# Patient Record
Sex: MALE | Race: WHITE | NOT HISPANIC OR LATINO
[De-identification: names, ages, dates, MRNs, and addresses within clinical notes are randomized per-mention and may not be internally consistent; named-entity substitution may affect disease eponyms.]

---

## 2018-12-18 ENCOUNTER — APPOINTMENT (OUTPATIENT)
Dept: CARDIOLOGY | Facility: CLINIC | Age: 58
End: 2018-12-18

## 2018-12-19 ENCOUNTER — APPOINTMENT (OUTPATIENT)
Dept: NEUROLOGY | Facility: CLINIC | Age: 58
End: 2018-12-19

## 2018-12-21 ENCOUNTER — OUTPATIENT (OUTPATIENT)
Dept: OUTPATIENT SERVICES | Facility: HOSPITAL | Age: 58
LOS: 1 days | End: 2018-12-21
Payer: COMMERCIAL

## 2018-12-21 VITALS
DIASTOLIC BLOOD PRESSURE: 68 MMHG | HEART RATE: 56 BPM | OXYGEN SATURATION: 98 % | WEIGHT: 255.96 LBS | HEIGHT: 75 IN | RESPIRATION RATE: 18 BRPM | TEMPERATURE: 97 F | SYSTOLIC BLOOD PRESSURE: 120 MMHG

## 2018-12-21 DIAGNOSIS — G47.33 OBSTRUCTIVE SLEEP APNEA (ADULT) (PEDIATRIC): ICD-10-CM

## 2018-12-21 DIAGNOSIS — Z96.60 PRESENCE OF UNSPECIFIED ORTHOPEDIC JOINT IMPLANT: Chronic | ICD-10-CM

## 2018-12-21 DIAGNOSIS — I65.22 OCCLUSION AND STENOSIS OF LEFT CAROTID ARTERY: ICD-10-CM

## 2018-12-21 DIAGNOSIS — Z29.9 ENCOUNTER FOR PROPHYLACTIC MEASURES, UNSPECIFIED: ICD-10-CM

## 2018-12-21 DIAGNOSIS — I35.2 NONRHEUMATIC AORTIC (VALVE) STENOSIS WITH INSUFFICIENCY: ICD-10-CM

## 2018-12-21 DIAGNOSIS — Z01.818 ENCOUNTER FOR OTHER PREPROCEDURAL EXAMINATION: ICD-10-CM

## 2018-12-21 DIAGNOSIS — Z98.89 OTHER SPECIFIED POSTPROCEDURAL STATES: Chronic | ICD-10-CM

## 2018-12-21 DIAGNOSIS — I10 ESSENTIAL (PRIMARY) HYPERTENSION: ICD-10-CM

## 2018-12-21 LAB
ANION GAP SERPL CALC-SCNC: 15 MMOL/L — SIGNIFICANT CHANGE UP (ref 5–17)
BLD GP AB SCN SERPL QL: NEGATIVE — SIGNIFICANT CHANGE UP
BUN SERPL-MCNC: 17 MG/DL — SIGNIFICANT CHANGE UP (ref 7–23)
CALCIUM SERPL-MCNC: 10.2 MG/DL — SIGNIFICANT CHANGE UP (ref 8.4–10.5)
CHLORIDE SERPL-SCNC: 98 MMOL/L — SIGNIFICANT CHANGE UP (ref 96–108)
CO2 SERPL-SCNC: 22 MMOL/L — SIGNIFICANT CHANGE UP (ref 22–31)
CREAT SERPL-MCNC: 1.22 MG/DL — SIGNIFICANT CHANGE UP (ref 0.5–1.3)
GLUCOSE SERPL-MCNC: 77 MG/DL — SIGNIFICANT CHANGE UP (ref 70–99)
HCT VFR BLD CALC: 44.7 % — SIGNIFICANT CHANGE UP (ref 39–50)
HGB BLD-MCNC: 14.8 G/DL — SIGNIFICANT CHANGE UP (ref 13–17)
MCHC RBC-ENTMCNC: 33 PG — SIGNIFICANT CHANGE UP (ref 27–34)
MCHC RBC-ENTMCNC: 33.1 GM/DL — SIGNIFICANT CHANGE UP (ref 32–36)
MCV RBC AUTO: 99.6 FL — SIGNIFICANT CHANGE UP (ref 80–100)
PLATELET # BLD AUTO: 273 K/UL — SIGNIFICANT CHANGE UP (ref 150–400)
POTASSIUM SERPL-MCNC: 4.3 MMOL/L — SIGNIFICANT CHANGE UP (ref 3.5–5.3)
POTASSIUM SERPL-SCNC: 4.3 MMOL/L — SIGNIFICANT CHANGE UP (ref 3.5–5.3)
RBC # BLD: 4.49 M/UL — SIGNIFICANT CHANGE UP (ref 4.2–5.8)
RBC # FLD: 13.7 % — SIGNIFICANT CHANGE UP (ref 10.3–14.5)
RH IG SCN BLD-IMP: POSITIVE — SIGNIFICANT CHANGE UP
SODIUM SERPL-SCNC: 135 MMOL/L — SIGNIFICANT CHANGE UP (ref 135–145)
WBC # BLD: 10.21 K/UL — SIGNIFICANT CHANGE UP (ref 3.8–10.5)
WBC # FLD AUTO: 10.21 K/UL — SIGNIFICANT CHANGE UP (ref 3.8–10.5)

## 2018-12-21 PROCEDURE — 86901 BLOOD TYPING SEROLOGIC RH(D): CPT

## 2018-12-21 PROCEDURE — G0463: CPT

## 2018-12-21 PROCEDURE — 93010 ELECTROCARDIOGRAM REPORT: CPT

## 2018-12-21 PROCEDURE — 85027 COMPLETE CBC AUTOMATED: CPT

## 2018-12-21 PROCEDURE — 86900 BLOOD TYPING SEROLOGIC ABO: CPT

## 2018-12-21 PROCEDURE — 86850 RBC ANTIBODY SCREEN: CPT

## 2018-12-21 PROCEDURE — 93005 ELECTROCARDIOGRAM TRACING: CPT

## 2018-12-21 PROCEDURE — 80048 BASIC METABOLIC PNL TOTAL CA: CPT

## 2018-12-21 NOTE — H&P PST ADULT - RS GEN PE MLT RESP DETAILS PC
clear to auscultation bilaterally/breath sounds equal/normal/respirations non-labored/good air movement

## 2018-12-21 NOTE — H&P PST ADULT - ASSESSMENT
CAPRINI SCORE [CLOT]    AGE RELATED RISK FACTORS                                                       MOBILITY RELATED FACTORS  [x ] Age 41-60 years                                            (1 Point)                  [ ] Bed rest                                                        (1 Point)  [ ] Age: 61-74 years                                           (2 Points)                 [ ] Plaster cast                                                   (2 Points)  [ ] Age= 75 years                                              (3 Points)                 [ ] Bed bound for more than 72 hours                 (2 Points)    DISEASE RELATED RISK FACTORS                                               GENDER SPECIFIC FACTORS  [ ] Edema in the lower extremities                       (1 Point)                  [ ] Pregnancy                                                     (1 Point)  [ ] Varicose veins                                               (1 Point)                  [ ] Post-partum < 6 weeks                                   (1 Point)             [x ] BMI > 25 Kg/m2                                            (1 Point)                  [ ] Hormonal therapy  or oral contraception          (1 Point)                 [ ] Sepsis (in the previous month)                        (1 Point)                  [ ] History of pregnancy complications                 (1 point)  [ ] Pneumonia or serious lung disease                                               [ ] Unexplained or recurrent                     (1 Point)           (in the previous month)                               (1 Point)  [ ] Abnormal pulmonary function test                     (1 Point)                 SURGERY RELATED RISK FACTORS  [ ] Acute myocardial infarction                              (1 Point)                 [ ]  Section                                             (1 Point)  [ ] Congestive heart failure (in the previous month)  (1 Point)               [ ] Minor surgery                                                  (1 Point)   [ ] Inflammatory bowel disease                             (1 Point)                 [ ] Arthroscopic surgery                                        (2 Points)  [ ] Central venous access                                      (2 Points)                [x ] General surgery lasting more than 45 minutes   (2 Points)       [ ] Stroke (in the previous month)                          (5 Points)               [ ] Elective arthroplasty                                         (5 Points)                                                                                                                                               HEMATOLOGY RELATED FACTORS                                                 TRAUMA RELATED RISK FACTORS  [ ] Prior episodes of VTE                                     (3 Points)                [ ] Fracture of the hip, pelvis, or leg                       (5 Points)  [ ] Positive family history for VTE                         (3 Points)                 [ ] Acute spinal cord injury (in the previous month)  (5 Points)  [ ] Prothrombin 65358 A                                     (3 Points)                 [ ] Paralysis  (less than 1 month)                             (5 Points)  [ ] Factor V Leiden                                             (3 Points)                  [ ] Multiple Trauma within 1 month                        (5 Points)  [ ] Lupus anticoagulants                                     (3 Points)                                                           [ ] Anticardiolipin antibodies                               (3 Points)                                                       [ ] High homocysteine in the blood                      (3 Points)                                             [ ] Other congenital or acquired thrombophilia      (3 Points)                                                [ ] Heparin induced thrombocytopenia                  (3 Points)                                          Total Score [        4  ] CAPRINI SCORE [CLOT]    AGE RELATED RISK FACTORS                                                       MOBILITY RELATED FACTORS  [x ] Age 41-60 years                                            (1 Point)                  [ ] Bed rest                                                        (1 Point)  [ ] Age: 61-74 years                                           (2 Points)                 [ ] Plaster cast                                                   (2 Points)  [ ] Age= 75 years                                              (3 Points)                 [ ] Bed bound for more than 72 hours                 (2 Points)    DISEASE RELATED RISK FACTORS                                               GENDER SPECIFIC FACTORS  [ ] Edema in the lower extremities                       (1 Point)                  [ ] Pregnancy                                                     (1 Point)  [ ] Varicose veins                                               (1 Point)                  [ ] Post-partum < 6 weeks                                   (1 Point)             [x ] BMI > 25 Kg/m2                                            (1 Point)                  [ ] Hormonal therapy  or oral contraception          (1 Point)                 [ ] Sepsis (in the previous month)                        (1 Point)                  [ ] History of pregnancy complications                 (1 point)  [ ] Pneumonia or serious lung disease                                               [ ] Unexplained or recurrent                     (1 Point)           (in the previous month)                               (1 Point)  [ ] Abnormal pulmonary function test                     (1 Point)                 SURGERY RELATED RISK FACTORS  [ ] Acute myocardial infarction                              (1 Point)                 [ ]  Section                                             (1 Point)  [ ] Congestive heart failure (in the previous month)  (1 Point)               [ ] Minor surgery                                                  (1 Point)   [ ] Inflammatory bowel disease                             (1 Point)                 [ ] Arthroscopic surgery                                        (2 Points)  [ ] Central venous access                                      (2 Points)                [x ] General surgery lasting more than 45 minutes   (2 Points)       [x ] Stroke (in the previous month)                          (5 Points)               [ ] Elective arthroplasty                                         (5 Points)                                                                                                                                               HEMATOLOGY RELATED FACTORS                                                 TRAUMA RELATED RISK FACTORS  [ ] Prior episodes of VTE                                     (3 Points)                [ ] Fracture of the hip, pelvis, or leg                       (5 Points)  [ ] Positive family history for VTE                         (3 Points)                 [ ] Acute spinal cord injury (in the previous month)  (5 Points)  [ ] Prothrombin 82106 A                                     (3 Points)                 [ ] Paralysis  (less than 1 month)                             (5 Points)  [ ] Factor V Leiden                                             (3 Points)                  [ ] Multiple Trauma within 1 month                        (5 Points)  [ ] Lupus anticoagulants                                     (3 Points)                                                           [ ] Anticardiolipin antibodies                               (3 Points)                                                       [ ] High homocysteine in the blood                      (3 Points)                                             [ ] Other congenital or acquired thrombophilia      (3 Points)                                                [ ] Heparin induced thrombocytopenia                  (3 Points)                                          Total Score [        9  ]

## 2018-12-21 NOTE — H&P PST ADULT - PROBLEM SELECTOR PLAN 1
Scheduled for Left Cartoid endarterectomy on 1/2/2018  Plavix last dose today 12/21 as per surgeon Dr. Madrigal   Continue asa 81mg morning of surgery  Instructions reviewed with patient   CBC, BMP, Type and cross   EKG Scheduled for Left Cartoid endarterectomy on 1/2/2018  Plavix last dose today 12/21 as per surgeon Dr. Madrigal   Continue asa 81mg morning of surgery  Instructions reviewed with patient   CBC, BMP, Type and cross   EKG  Cardiac evaluation scheduled for December 28th with Dr. Prieto

## 2018-12-21 NOTE — H&P PST ADULT - ATTENDING COMMENTS
We plan left cea for high grade stenosis.  All risks and alternatives were reiterated to the Losses who agree with this plan.

## 2018-12-21 NOTE — H&P PST ADULT - PMH
CAD (coronary artery disease)    Carotid stenosis    Diverticulitis    HLD (hyperlipidemia)    HTN (hypertension)    Obesity    TIA (transient ischemic attack)  November 2018

## 2018-12-21 NOTE — H&P PST ADULT - PROBLEM SELECTOR PLAN 3
The Caprini score indicates that this patient is at high risk for a VTE event (score > = 6).    Ssurgical patients in this group will benefit from both pharmacologic prophylaxis and intermittent compression devices.    The surgical team will determine the balance between VTE risk and bleeding risk, and other clinical considerations.

## 2018-12-21 NOTE — H&P PST ADULT - HISTORY OF PRESENT ILLNESS
Pt. is a 58 year old male with PMH htn, hld, cardiac stents last placed 2013. Pt. was admitted to medical ICU in [facility] on November 30th for presumed stroke due to symptoms. CTA at that time was negative for hemorrhage TPA was given. Pt. signed out AMA the morning after. While admitted pt. was seen by vascular and had a cartoid doppler that was significant for stenosis of the left more than right cartoid arteries. Pt. presents today for presurgical testing for Left cartoid endarterectomy on 1/2/18. Pt. is a 58 year old male with PMH htn, hld, cardiac stents last placed 2013. Pt. was admitted to medical ICU in [facility] on November 30th for presumed TIA/stroke due to symptoms. CTA at that time was negative for hemorrhage TPA was given. Pt. signed out AMA the morning after. While admitted pt. was seen by vascular and had a cartoid doppler that was significant for stenosis of the left more than right cartoid arteries. Pt. presents today for presurgical testing for Left cartoid endarterectomy on 1/2/18. Pt. states feels well today denies any chest pain, SOB, fever, chills, N/V/D. Pt. is a 58 year old male who is a poor historian with PMH htn, hld, cardiac stents last placed 2013. Pt. was admitted to medical ICU in [facility] on November 30th for presumed TIA/stroke due to symptoms. CTA at that time was negative for hemorrhage TPA was given. Pt. signed out AMA the morning after. While admitted pt. was seen by vascular and had a cartoid doppler that was significant for stenosis of the left more than right cartoid arteries. Pt. presents today for presurgical testing for Left cartoid endarterectomy on 1/2/18. Pt. states feels well today denies any chest pain, SOB, fever, chills, N/V/D. Pt. is a 58 year old male who is a poor historian with PMH htn, hld, cardiac stents last placed 2013. Pt. was admitted to medical ICU in [facility] on November 30th for presumed TIA/stroke due to symptoms. CTA at that time was negative for hemorrhage TPA was given. Pt. seen by neurology and started on Keppra for possible seizure activity. Pt. signed out AMA the morning after. While admitted pt. was seen by vascular and had a cartoid doppler that was significant for stenosis of the left Bilateral untied mittens applied to prevent pulling of lines, alternative measures such as increased safety checks done prior to applyning of untied mittens. Education provided to patients/family on reason for untied mittens. Safety and comfort checks completed q30min. Assessment for release, skin and neurovascular assessment completed q2hr and remain within defined limits. Bilateral untied mittens remain in place, assessment fro patient's safety maintained.ore than right cartoid arteries. Pt. presents today for presurgical testing for Left cartoid endarterectomy on 1/2/18. Pt. states feels well today denies any chest pain, SOB, fever, chills, N/V/D. Pt. is a 58 year old male who is a poor historian with PMH htn, hld, cardiac stents last placed 2013. Pt. was admitted to medical ICU in [facility] on November 30th for presumed TIA/stroke due to symptoms. CTA at that time was negative for hemorrhage TPA was given. Pt. seen by neurology and started on Keppra for possible seizure activity. Pt. signed out AMA the morning after. While admitted pt. was seen by vascular and had a cartoid doppler that was significant for stenosis of the left Bilateral carotid arteries left greater than right. Pt. presents today for presurgical testing for Left cartoid endarterectomy on 1/2/18. Pt. states feels well today denies any chest pain, SOB, fever, chills, N/V/D.

## 2018-12-24 ENCOUNTER — APPOINTMENT (OUTPATIENT)
Dept: CARDIOLOGY | Facility: CLINIC | Age: 58
End: 2018-12-24
Payer: COMMERCIAL

## 2018-12-24 VITALS
OXYGEN SATURATION: 100 % | DIASTOLIC BLOOD PRESSURE: 63 MMHG | SYSTOLIC BLOOD PRESSURE: 95 MMHG | HEART RATE: 49 BPM | TEMPERATURE: 97.4 F

## 2018-12-24 DIAGNOSIS — E78.00 PURE HYPERCHOLESTEROLEMIA, UNSPECIFIED: ICD-10-CM

## 2018-12-24 PROCEDURE — 93000 ELECTROCARDIOGRAM COMPLETE: CPT

## 2018-12-24 PROCEDURE — 99204 OFFICE O/P NEW MOD 45 MIN: CPT

## 2018-12-24 NOTE — PHYSICAL EXAM
[General Appearance - Well Developed] : well developed [Normal Appearance] : normal appearance [Well Groomed] : well groomed [General Appearance - Well Nourished] : well nourished [No Deformities] : no deformities [General Appearance - In No Acute Distress] : no acute distress [Normal Conjunctiva] : the conjunctiva exhibited no abnormalities [Eyelids - No Xanthelasma] : the eyelids demonstrated no xanthelasmas [Normal Oral Mucosa] : normal oral mucosa [No Oral Pallor] : no oral pallor [No Oral Cyanosis] : no oral cyanosis [Normal Jugular Venous A Waves Present] : normal jugular venous A waves present [Normal Jugular Venous V Waves Present] : normal jugular venous V waves present [No Jugular Venous Richey A Waves] : no jugular venous richey A waves [Heart Rate And Rhythm] : heart rate and rhythm were normal [Heart Sounds] : normal S1 and S2 [Murmurs] : no murmurs present [Respiration, Rhythm And Depth] : normal respiratory rhythm and effort [Exaggerated Use Of Accessory Muscles For Inspiration] : no accessory muscle use [Auscultation Breath Sounds / Voice Sounds] : lungs were clear to auscultation bilaterally [Abdomen Soft] : soft [Abdomen Tenderness] : non-tender [Abdomen Mass (___ Cm)] : no abdominal mass palpated [Abnormal Walk] : normal gait [Gait - Sufficient For Exercise Testing] : the gait was sufficient for exercise testing [Nail Clubbing] : no clubbing of the fingernails [Cyanosis, Localized] : no localized cyanosis [Petechial Hemorrhages (___cm)] : no petechial hemorrhages [Skin Color & Pigmentation] : normal skin color and pigmentation [] : no rash [No Venous Stasis] : no venous stasis [Skin Lesions] : no skin lesions [No Skin Ulcers] : no skin ulcer [No Xanthoma] : no  xanthoma was observed [Oriented To Time, Place, And Person] : oriented to person, place, and time [Affect] : the affect was normal [Mood] : the mood was normal [No Anxiety] : not feeling anxious

## 2018-12-24 NOTE — HISTORY OF PRESENT ILLNESS
[FreeTextEntry1] : 58 year old man with hypertension, hyperlipidemia and smoking. Long history of CAD dating back to 1995 when he received his first stent.  He has had subsequent coronary stents and a fem-pop for peripheral vascular disease.  Recently he presented with transient weakness and aphasia with resolution after tPA treatment.  Vascular workup has identified severe left carotid disease..

## 2018-12-24 NOTE — DISCUSSION/SUMMARY
[FreeTextEntry1] : 58 year old man with severe vascular disease including peripheral and coronary beds.  He will be needing CEA because of recent stroke.  Because of long history of CAD and inability to evaluate symptom because of recent stroke, I have recommended cardiac catheterization prior to CEA.

## 2018-12-24 NOTE — REASON FOR VISIT
[Consultation] : a consultation regarding [Coronary Artery Disease] : coronary artery disease [Peripheral Vascular Disease] : peripheral vascular disease [FreeTextEntry1] : stroke

## 2018-12-27 PROBLEM — I65.29 OCCLUSION AND STENOSIS OF UNSPECIFIED CAROTID ARTERY: Chronic | Status: ACTIVE | Noted: 2018-12-21

## 2018-12-27 PROBLEM — G45.9 TRANSIENT CEREBRAL ISCHEMIC ATTACK, UNSPECIFIED: Chronic | Status: ACTIVE | Noted: 2018-12-21

## 2019-01-02 ENCOUNTER — OUTPATIENT (OUTPATIENT)
Dept: OUTPATIENT SERVICES | Facility: HOSPITAL | Age: 59
LOS: 1 days | End: 2019-01-02
Payer: COMMERCIAL

## 2019-01-02 VITALS
HEART RATE: 54 BPM | DIASTOLIC BLOOD PRESSURE: 74 MMHG | RESPIRATION RATE: 18 BRPM | HEIGHT: 75 IN | SYSTOLIC BLOOD PRESSURE: 127 MMHG | TEMPERATURE: 98 F | WEIGHT: 179.9 LBS | OXYGEN SATURATION: 97 %

## 2019-01-02 DIAGNOSIS — Z96.60 PRESENCE OF UNSPECIFIED ORTHOPEDIC JOINT IMPLANT: Chronic | ICD-10-CM

## 2019-01-02 DIAGNOSIS — Z90.49 ACQUIRED ABSENCE OF OTHER SPECIFIED PARTS OF DIGESTIVE TRACT: Chronic | ICD-10-CM

## 2019-01-02 DIAGNOSIS — I25.10 ATHEROSCLEROTIC HEART DISEASE OF NATIVE CORONARY ARTERY WITHOUT ANGINA PECTORIS: ICD-10-CM

## 2019-01-02 DIAGNOSIS — Z98.89 OTHER SPECIFIED POSTPROCEDURAL STATES: Chronic | ICD-10-CM

## 2019-01-02 LAB
ALBUMIN SERPL ELPH-MCNC: 4.4 G/DL — SIGNIFICANT CHANGE UP (ref 3.3–5)
ALP SERPL-CCNC: 81 U/L — SIGNIFICANT CHANGE UP (ref 40–120)
ALT FLD-CCNC: 17 U/L — SIGNIFICANT CHANGE UP (ref 10–45)
ANION GAP SERPL CALC-SCNC: 16 MMOL/L — SIGNIFICANT CHANGE UP (ref 5–17)
AST SERPL-CCNC: 32 U/L — SIGNIFICANT CHANGE UP (ref 10–40)
BILIRUB SERPL-MCNC: 0.6 MG/DL — SIGNIFICANT CHANGE UP (ref 0.2–1.2)
BUN SERPL-MCNC: 17 MG/DL — SIGNIFICANT CHANGE UP (ref 7–23)
CALCIUM SERPL-MCNC: 9.4 MG/DL — SIGNIFICANT CHANGE UP (ref 8.4–10.5)
CHLORIDE SERPL-SCNC: 99 MMOL/L — SIGNIFICANT CHANGE UP (ref 96–108)
CO2 SERPL-SCNC: 22 MMOL/L — SIGNIFICANT CHANGE UP (ref 22–31)
CREAT SERPL-MCNC: 1.24 MG/DL — SIGNIFICANT CHANGE UP (ref 0.5–1.3)
GLUCOSE SERPL-MCNC: 92 MG/DL — SIGNIFICANT CHANGE UP (ref 70–99)
HCT VFR BLD CALC: 43.8 % — SIGNIFICANT CHANGE UP (ref 39–50)
HGB BLD-MCNC: 15 G/DL — SIGNIFICANT CHANGE UP (ref 13–17)
MCHC RBC-ENTMCNC: 33 PG — SIGNIFICANT CHANGE UP (ref 27–34)
MCHC RBC-ENTMCNC: 34.3 GM/DL — SIGNIFICANT CHANGE UP (ref 32–36)
MCV RBC AUTO: 96.3 FL — SIGNIFICANT CHANGE UP (ref 80–100)
PLATELET # BLD AUTO: 283 K/UL — SIGNIFICANT CHANGE UP (ref 150–400)
POTASSIUM SERPL-MCNC: 4.3 MMOL/L — SIGNIFICANT CHANGE UP (ref 3.5–5.3)
POTASSIUM SERPL-SCNC: 4.3 MMOL/L — SIGNIFICANT CHANGE UP (ref 3.5–5.3)
PROT SERPL-MCNC: 7.9 G/DL — SIGNIFICANT CHANGE UP (ref 6–8.3)
RBC # BLD: 4.55 M/UL — SIGNIFICANT CHANGE UP (ref 4.2–5.8)
RBC # FLD: 12.5 % — SIGNIFICANT CHANGE UP (ref 10.3–14.5)
SODIUM SERPL-SCNC: 137 MMOL/L — SIGNIFICANT CHANGE UP (ref 135–145)
WBC # BLD: 11.4 K/UL — HIGH (ref 3.8–10.5)
WBC # FLD AUTO: 11.4 K/UL — HIGH (ref 3.8–10.5)

## 2019-01-02 PROCEDURE — 93458 L HRT ARTERY/VENTRICLE ANGIO: CPT | Mod: 26

## 2019-01-02 PROCEDURE — 93458 L HRT ARTERY/VENTRICLE ANGIO: CPT

## 2019-01-02 PROCEDURE — 99152 MOD SED SAME PHYS/QHP 5/>YRS: CPT

## 2019-01-02 PROCEDURE — 80053 COMPREHEN METABOLIC PANEL: CPT

## 2019-01-02 PROCEDURE — C1887: CPT

## 2019-01-02 PROCEDURE — 76937 US GUIDE VASCULAR ACCESS: CPT

## 2019-01-02 PROCEDURE — 93010 ELECTROCARDIOGRAM REPORT: CPT

## 2019-01-02 PROCEDURE — C1769: CPT

## 2019-01-02 PROCEDURE — 99213 OFFICE O/P EST LOW 20 MIN: CPT

## 2019-01-02 PROCEDURE — 93005 ELECTROCARDIOGRAM TRACING: CPT

## 2019-01-02 PROCEDURE — 76937 US GUIDE VASCULAR ACCESS: CPT | Mod: 26

## 2019-01-02 PROCEDURE — C1894: CPT

## 2019-01-02 PROCEDURE — 99153 MOD SED SAME PHYS/QHP EA: CPT

## 2019-01-02 PROCEDURE — 85027 COMPLETE CBC AUTOMATED: CPT

## 2019-01-02 RX ORDER — LEVETIRACETAM 250 MG/1
1 TABLET, FILM COATED ORAL
Qty: 0 | Refills: 0 | COMMUNITY

## 2019-01-02 RX ORDER — METOPROLOL TARTRATE 50 MG
25 TABLET ORAL
Qty: 0 | Refills: 0 | COMMUNITY

## 2019-01-02 NOTE — H&P CARDIOLOGY - PMH
CAD (coronary artery disease)    Carotid stenosis    Diverticulitis    HLD (hyperlipidemia)    HTN (hypertension)    Obesity    TIA (transient ischemic attack)  November 2018 CAD (coronary artery disease)    Carotid stenosis    Diverticulitis    Diverticulitis    HLD (hyperlipidemia)    HTN (hypertension)    Obesity    CANDELARIA (obstructive sleep apnea)    TIA (transient ischemic attack)  November 2018

## 2019-01-02 NOTE — H&P CARDIOLOGY - PSH
S/P bypass graft of extremity    S/P hip replacement History of colon resection    S/P bypass graft of extremity    S/P hip replacement

## 2019-01-02 NOTE — H&P CARDIOLOGY - HISTORY OF PRESENT ILLNESS
58 year old  male who is a poor historian with PMH HTN, HLD, CAD/PCI with Prior stents last placed 2013, Right fem-pop bypass in 2004. Pt. was admitted to medical ICU in [facility] on November 30th for presumed TIA/stroke due to symptoms. CTA at that time was negative for hemorrhage, TPA was given. Pt. was seen by neurology and started on Keppra for possible seizure activity, currently off Keppra as R/O Seizure. Pt. signed out AMA the morning after. While admitted pt. was seen by vascular and had a cartoid doppler that was significant for stenosis of the left Bilateral carotid arteries left greater than right. Pt. was scheduled for Left cartoid endarterectomy on 1/2/19 but got postponed as pt needed cardiac clearance prior to procedure. Pt denies any chest pain, SOB, fever, chills, N/V/D. 58 year old  male who is a poor historian with PMH HTN, HLD, CAD/PCI with Prior stents last placed 2013, Right fem-pop bypass in 2004, CANDELARIA- does not use CPAP. Pt. was admitted to medical ICU in [facility] on November 30th for presumed TIA/stroke due to symptoms. CTA at that time was negative for hemorrhage, TPA was given. Pt. was seen by neurology and started on Keppra for possible seizure activity, currently off Keppra as R/O Seizure. Pt. signed out AMA the morning after. While admitted pt. was seen by vascular and had a cartoid doppler that was significant for stenosis of the left Bilateral carotid arteries left greater than right. Pt. was scheduled for Left cartoid endarterectomy on 1/2/19 but got postponed as pt needed cardiac clearance prior to procedure. Pt denies any chest pain, SOB, fever, chills, N/V/D.

## 2019-01-07 PROBLEM — K57.92 DIVERTICULITIS OF INTESTINE, PART UNSPECIFIED, WITHOUT PERFORATION OR ABSCESS WITHOUT BLEEDING: Chronic | Status: ACTIVE | Noted: 2019-01-02

## 2019-01-14 ENCOUNTER — APPOINTMENT (OUTPATIENT)
Dept: CARDIOLOGY | Facility: CLINIC | Age: 59
End: 2019-01-14

## 2019-01-29 ENCOUNTER — NON-APPOINTMENT (OUTPATIENT)
Age: 59
End: 2019-01-29

## 2019-01-29 ENCOUNTER — APPOINTMENT (OUTPATIENT)
Dept: CARDIOLOGY | Facility: CLINIC | Age: 59
End: 2019-01-29
Payer: COMMERCIAL

## 2019-01-29 VITALS
OXYGEN SATURATION: 98 % | BODY MASS INDEX: 32.33 KG/M2 | DIASTOLIC BLOOD PRESSURE: 72 MMHG | WEIGHT: 260 LBS | HEIGHT: 75 IN | RESPIRATION RATE: 18 BRPM | SYSTOLIC BLOOD PRESSURE: 113 MMHG | HEART RATE: 70 BPM

## 2019-01-29 DIAGNOSIS — Z78.9 OTHER SPECIFIED HEALTH STATUS: ICD-10-CM

## 2019-01-29 PROCEDURE — 93000 ELECTROCARDIOGRAM COMPLETE: CPT

## 2019-01-29 PROCEDURE — 99215 OFFICE O/P EST HI 40 MIN: CPT

## 2019-01-29 RX ORDER — CLOPIDOGREL 75 MG/1
75 TABLET, FILM COATED ORAL DAILY
Qty: 30 | Refills: 0 | Status: ACTIVE | COMMUNITY

## 2019-01-29 RX ORDER — ASPIRIN ENTERIC COATED TABLETS 81 MG 81 MG/1
81 TABLET, DELAYED RELEASE ORAL DAILY
Qty: 90 | Refills: 0 | Status: ACTIVE | COMMUNITY
Start: 2019-01-29

## 2019-01-29 NOTE — PHYSICAL EXAM
[General Appearance - Well Developed] : well developed [Normal Appearance] : normal appearance [Well Groomed] : well groomed [General Appearance - Well Nourished] : well nourished [No Deformities] : no deformities [General Appearance - In No Acute Distress] : no acute distress [Normal Conjunctiva] : the conjunctiva exhibited no abnormalities [Eyelids - No Xanthelasma] : the eyelids demonstrated no xanthelasmas [Normal Oral Mucosa] : normal oral mucosa [No Oral Pallor] : no oral pallor [No Oral Cyanosis] : no oral cyanosis [Normal Jugular Venous A Waves Present] : normal jugular venous A waves present [Normal Jugular Venous V Waves Present] : normal jugular venous V waves present [No Jugular Venous Richey A Waves] : no jugular venous richey A waves [Respiration, Rhythm And Depth] : normal respiratory rhythm and effort [Exaggerated Use Of Accessory Muscles For Inspiration] : no accessory muscle use [Auscultation Breath Sounds / Voice Sounds] : lungs were clear to auscultation bilaterally [Heart Rate And Rhythm] : heart rate and rhythm were normal [Heart Sounds] : normal S1 and S2 [Murmurs] : no murmurs present [Abdomen Soft] : soft [Abdomen Tenderness] : non-tender [Abdomen Mass (___ Cm)] : no abdominal mass palpated [Abnormal Walk] : normal gait [Gait - Sufficient For Exercise Testing] : the gait was sufficient for exercise testing [Nail Clubbing] : no clubbing of the fingernails [Cyanosis, Localized] : no localized cyanosis [Petechial Hemorrhages (___cm)] : no petechial hemorrhages [Skin Color & Pigmentation] : normal skin color and pigmentation [] : no rash [No Venous Stasis] : no venous stasis [Skin Lesions] : no skin lesions [No Skin Ulcers] : no skin ulcer [No Xanthoma] : no  xanthoma was observed [Oriented To Time, Place, And Person] : oriented to person, place, and time [Affect] : the affect was normal [Mood] : the mood was normal [No Anxiety] : not feeling anxious

## 2019-01-29 NOTE — REASON FOR VISIT
[Follow-Up - Clinic] : a clinic follow-up of [Coronary Artery Disease] : coronary artery disease [Peripheral Vascular Disease] : peripheral vascular disease

## 2019-01-29 NOTE — HISTORY OF PRESENT ILLNESS
[FreeTextEntry1] : Patient preop for CEA.  CAD and PVD.  Cardiac cath showed patent coronary stents.

## 2019-02-08 ENCOUNTER — OUTPATIENT (OUTPATIENT)
Dept: OUTPATIENT SERVICES | Facility: HOSPITAL | Age: 59
LOS: 1 days | End: 2019-02-08
Payer: COMMERCIAL

## 2019-02-08 VITALS
SYSTOLIC BLOOD PRESSURE: 116 MMHG | HEART RATE: 60 BPM | DIASTOLIC BLOOD PRESSURE: 78 MMHG | OXYGEN SATURATION: 98 % | HEIGHT: 75 IN | RESPIRATION RATE: 17 BRPM | WEIGHT: 261.03 LBS | TEMPERATURE: 98 F

## 2019-02-08 DIAGNOSIS — G47.33 OBSTRUCTIVE SLEEP APNEA (ADULT) (PEDIATRIC): ICD-10-CM
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
## 2019-02-08 DIAGNOSIS — Z96.60 PRESENCE OF UNSPECIFIED ORTHOPEDIC JOINT IMPLANT: Chronic | ICD-10-CM

## 2019-02-08 DIAGNOSIS — Z01.84 ENCOUNTER FOR ANTIBODY RESPONSE EXAMINATION: ICD-10-CM

## 2019-02-08 DIAGNOSIS — Z90.49 ACQUIRED ABSENCE OF OTHER SPECIFIED PARTS OF DIGESTIVE TRACT: Chronic | ICD-10-CM

## 2019-02-08 DIAGNOSIS — I35.2 NONRHEUMATIC AORTIC (VALVE) STENOSIS WITH INSUFFICIENCY: ICD-10-CM

## 2019-02-08 DIAGNOSIS — I65.23 OCCLUSION AND STENOSIS OF BILATERAL CAROTID ARTERIES: ICD-10-CM

## 2019-02-08 DIAGNOSIS — Z90.89 ACQUIRED ABSENCE OF OTHER ORGANS: Chronic | ICD-10-CM

## 2019-02-08 DIAGNOSIS — Z98.89 OTHER SPECIFIED POSTPROCEDURAL STATES: Chronic | ICD-10-CM

## 2019-02-08 DIAGNOSIS — Z98.61 CORONARY ANGIOPLASTY STATUS: Chronic | ICD-10-CM

## 2019-02-08 DIAGNOSIS — I65.22 OCCLUSION AND STENOSIS OF LEFT CAROTID ARTERY: ICD-10-CM

## 2019-02-08 PROCEDURE — 86901 BLOOD TYPING SEROLOGIC RH(D): CPT

## 2019-02-08 PROCEDURE — 86850 RBC ANTIBODY SCREEN: CPT

## 2019-02-08 PROCEDURE — G0463: CPT

## 2019-02-08 PROCEDURE — 80048 BASIC METABOLIC PNL TOTAL CA: CPT

## 2019-02-08 PROCEDURE — 85027 COMPLETE CBC AUTOMATED: CPT

## 2019-02-08 PROCEDURE — 86900 BLOOD TYPING SEROLOGIC ABO: CPT

## 2019-02-08 RX ORDER — LIDOCAINE HCL 20 MG/ML
0.2 VIAL (ML) INJECTION ONCE
Qty: 0 | Refills: 0 | Status: DISCONTINUED | OUTPATIENT
Start: 2019-02-13 | End: 2019-02-13

## 2019-02-08 RX ORDER — SODIUM CHLORIDE 9 MG/ML
3 INJECTION INTRAMUSCULAR; INTRAVENOUS; SUBCUTANEOUS EVERY 8 HOURS
Qty: 0 | Refills: 0 | Status: DISCONTINUED | OUTPATIENT
Start: 2019-02-13 | End: 2019-02-13

## 2019-02-08 RX ORDER — CEFAZOLIN SODIUM 1 G
2000 VIAL (EA) INJECTION ONCE
Qty: 0 | Refills: 0 | Status: COMPLETED | OUTPATIENT
Start: 2019-02-13 | End: 2019-02-13

## 2019-02-08 NOTE — H&P PST ADULT - ASSESSMENT
CAPRINI SCORE [CLOT]    AGE RELATED RISK FACTORS                                                       MOBILITY RELATED FACTORS  [x ] Age 41-60 years                                            (1 Point)                  [ ] Bed rest                                                        (1 Point)  [ ] Age: 61-74 years                                           (2 Points)                 [ ] Plaster cast                                                   (2 Points)  [ ] Age= 75 years                                              (3 Points)                 [ ] Bed bound for more than 72 hours                 (2 Points)    DISEASE RELATED RISK FACTORS                                               GENDER SPECIFIC FACTORS  [ ] Edema in the lower extremities                       (1 Point)                  [ ] Pregnancy                                                     (1 Point)  [ ] Varicose veins                                               (1 Point)                  [ ] Post-partum < 6 weeks                                   (1 Point)             [x ] BMI > 25 Kg/m2                                            (1 Point)                  [ ] Hormonal therapy  or oral contraception          (1 Point)                 [ ] Sepsis (in the previous month)                        (1 Point)                  [ ] History of pregnancy complications                 (1 point)  [ ] Pneumonia or serious lung disease                                               [ ] Unexplained or recurrent                     (1 Point)           (in the previous month)                               (1 Point)  [ ] Abnormal pulmonary function test                     (1 Point)                 SURGERY RELATED RISK FACTORS  [ ] Acute myocardial infarction                              (1 Point)                 [ ]  Section                                             (1 Point)  [ ] Congestive heart failure (in the previous month)  (1 Point)               [ ] Minor surgery                                                  (1 Point)   [ ] Inflammatory bowel disease                             (1 Point)                 [ ] Arthroscopic surgery                                        (2 Points)  [ ] Central venous access                                      (2 Points)                [x ] General surgery lasting more than 45 minutes   (2 Points)       [ ] Stroke (in the previous month)                          (5 Points)               [ ] Elective arthroplasty                                         (5 Points)                                                                                                                                               HEMATOLOGY RELATED FACTORS                                                 TRAUMA RELATED RISK FACTORS  [ ] Prior episodes of VTE                                     (3 Points)                 [ ] Fracture of the hip, pelvis, or leg                       (5 Points)  [ ] Positive family history for VTE                         (3 Points)                 [ ] Acute spinal cord injury (in the previous month)  (5 Points)  [ ] Prothrombin 14626 A                                     (3 Points)                 [ ] Paralysis  (less than 1 month)                             (5 Points)  [ ] Factor V Leiden                                             (3 Points)                  [ ] Multiple Trauma within 1 month                        (5 Points)  [ ] Lupus anticoagulants                                     (3 Points)                                                           [ ] Anticardiolipin antibodies                               (3 Points)                                                       [ ] High homocysteine in the blood                      (3 Points)                                             [ ] Other congenital or acquired thrombophilia      (3 Points)                                                [ ] Heparin induced thrombocytopenia                  (3 Points)                                          Total Score [   4       ]  The Caprini score indicates this patient is at risk for a VTE event (score 3-5).  Most surgical patients in this group would benefit from pharmacologic prophylaxis.  The surgical team will determine the balance between VTE risk and bleeding risk
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
## 2019-02-08 NOTE — H&P PST ADULT - HISTORY OF PRESENT ILLNESS
58 year old male with hx of TIA 11/18.  Work up dx with Left carotid stenosis for surgery.  *** On Plavix and Aspirin. Has been instructed by surgeon   Last dose Plavix 2/5/19  stay on aspirin

## 2019-02-08 NOTE — H&P PST ADULT - PSH
History of colon resection  2006  S/P bypass graft of extremity  fem to fem  2004  S/P hip replacement  left   2012  S/P tonsillectomy  childhood H/O coronary angioplasty  stent 1995 2014  History of colon resection  2006  S/P bypass graft of extremity  fem to fem  2004  S/P hip replacement  left   2012  S/P tonsillectomy  childhood

## 2019-02-09 PROBLEM — K57.92 DIVERTICULITIS OF INTESTINE, PART UNSPECIFIED, WITHOUT PERFORATION OR ABSCESS WITHOUT BLEEDING: Chronic | Status: INACTIVE | Noted: 2018-12-21 | Resolved: 2019-02-08

## 2019-02-09 PROBLEM — M87.00 IDIOPATHIC ASEPTIC NECROSIS OF UNSPECIFIED BONE: Chronic | Status: ACTIVE | Noted: 2019-02-08

## 2019-02-12 ENCOUNTER — TRANSCRIPTION ENCOUNTER (OUTPATIENT)
Age: 59
End: 2019-02-12

## 2019-02-13 ENCOUNTER — TRANSCRIPTION ENCOUNTER (OUTPATIENT)
Age: 59
End: 2019-02-13

## 2019-02-13 ENCOUNTER — INPATIENT (INPATIENT)
Facility: HOSPITAL | Age: 59
LOS: 0 days | Discharge: HOME | DRG: 39 | End: 2019-02-14
Attending: SURGERY | Admitting: SURGERY
Payer: COMMERCIAL

## 2019-02-13 ENCOUNTER — RESULT REVIEW (OUTPATIENT)
Age: 59
End: 2019-02-13

## 2019-02-13 VITALS
HEART RATE: 60 BPM | SYSTOLIC BLOOD PRESSURE: 112 MMHG | DIASTOLIC BLOOD PRESSURE: 70 MMHG | OXYGEN SATURATION: 98 % | WEIGHT: 265 LBS | TEMPERATURE: 97 F | HEIGHT: 75 IN | RESPIRATION RATE: 20 BRPM

## 2019-02-13 DIAGNOSIS — Z96.60 PRESENCE OF UNSPECIFIED ORTHOPEDIC JOINT IMPLANT: Chronic | ICD-10-CM

## 2019-02-13 DIAGNOSIS — I65.2 OCCLUSION AND STENOSIS OF CAROTID ARTERY: ICD-10-CM

## 2019-02-13 DIAGNOSIS — Z90.89 ACQUIRED ABSENCE OF OTHER ORGANS: Chronic | ICD-10-CM

## 2019-02-13 DIAGNOSIS — I35.2 NONRHEUMATIC AORTIC (VALVE) STENOSIS WITH INSUFFICIENCY: ICD-10-CM

## 2019-02-13 DIAGNOSIS — Z98.89 OTHER SPECIFIED POSTPROCEDURAL STATES: Chronic | ICD-10-CM

## 2019-02-13 DIAGNOSIS — Z98.61 CORONARY ANGIOPLASTY STATUS: Chronic | ICD-10-CM

## 2019-02-13 DIAGNOSIS — I25.10 ATHEROSCLEROTIC HEART DISEASE OF NATIVE CORONARY ARTERY WITHOUT ANGINA PECTORIS: ICD-10-CM

## 2019-02-13 DIAGNOSIS — Z90.49 ACQUIRED ABSENCE OF OTHER SPECIFIED PARTS OF DIGESTIVE TRACT: Chronic | ICD-10-CM

## 2019-02-13 LAB — GLUCOSE BLDC GLUCOMTR-MCNC: 132 MG/DL — HIGH (ref 70–99)

## 2019-02-13 PROCEDURE — 88304 TISSUE EXAM BY PATHOLOGIST: CPT | Mod: 26

## 2019-02-13 PROCEDURE — 88311 DECALCIFY TISSUE: CPT | Mod: 26

## 2019-02-13 RX ORDER — ONDANSETRON 8 MG/1
4 TABLET, FILM COATED ORAL ONCE
Qty: 0 | Refills: 0 | Status: COMPLETED | OUTPATIENT
Start: 2019-02-13 | End: 2019-02-13

## 2019-02-13 RX ORDER — SODIUM CHLORIDE 9 MG/ML
1000 INJECTION, SOLUTION INTRAVENOUS
Qty: 0 | Refills: 0 | Status: DISCONTINUED | OUTPATIENT
Start: 2019-02-13 | End: 2019-02-14

## 2019-02-13 RX ORDER — LISINOPRIL 2.5 MG/1
40 TABLET ORAL DAILY
Qty: 0 | Refills: 0 | Status: DISCONTINUED | OUTPATIENT
Start: 2019-02-13 | End: 2019-02-14

## 2019-02-13 RX ORDER — AMLODIPINE BESYLATE 2.5 MG/1
10 TABLET ORAL DAILY
Qty: 0 | Refills: 0 | Status: DISCONTINUED | OUTPATIENT
Start: 2019-02-14 | End: 2019-02-14

## 2019-02-13 RX ORDER — OXYCODONE HYDROCHLORIDE 5 MG/1
5 TABLET ORAL EVERY 4 HOURS
Qty: 0 | Refills: 0 | Status: DISCONTINUED | OUTPATIENT
Start: 2019-02-13 | End: 2019-02-14

## 2019-02-13 RX ORDER — HYDRALAZINE HCL 50 MG
10 TABLET ORAL
Qty: 0 | Refills: 0 | Status: DISCONTINUED | OUTPATIENT
Start: 2019-02-13 | End: 2019-02-14

## 2019-02-13 RX ORDER — ASPIRIN/CALCIUM CARB/MAGNESIUM 324 MG
81 TABLET ORAL DAILY
Qty: 0 | Refills: 0 | Status: DISCONTINUED | OUTPATIENT
Start: 2019-02-14 | End: 2019-02-14

## 2019-02-13 RX ORDER — ALPRAZOLAM 0.25 MG
0.5 TABLET ORAL DAILY
Qty: 0 | Refills: 0 | Status: DISCONTINUED | OUTPATIENT
Start: 2019-02-13 | End: 2019-02-14

## 2019-02-13 RX ORDER — INFLUENZA VIRUS VACCINE 15; 15; 15; 15 UG/.5ML; UG/.5ML; UG/.5ML; UG/.5ML
0.5 SUSPENSION INTRAMUSCULAR ONCE
Qty: 0 | Refills: 0 | Status: COMPLETED | OUTPATIENT
Start: 2019-02-13 | End: 2019-02-14

## 2019-02-13 RX ORDER — HEPARIN SODIUM 5000 [USP'U]/ML
5000 INJECTION INTRAVENOUS; SUBCUTANEOUS EVERY 8 HOURS
Qty: 0 | Refills: 0 | Status: DISCONTINUED | OUTPATIENT
Start: 2019-02-13 | End: 2019-02-14

## 2019-02-13 RX ORDER — ACETAMINOPHEN 500 MG
650 TABLET ORAL EVERY 6 HOURS
Qty: 0 | Refills: 0 | Status: DISCONTINUED | OUTPATIENT
Start: 2019-02-13 | End: 2019-02-14

## 2019-02-13 RX ORDER — ATORVASTATIN CALCIUM 80 MG/1
80 TABLET, FILM COATED ORAL AT BEDTIME
Qty: 0 | Refills: 0 | Status: DISCONTINUED | OUTPATIENT
Start: 2019-02-13 | End: 2019-02-14

## 2019-02-13 RX ORDER — HYDROMORPHONE HYDROCHLORIDE 2 MG/ML
0.5 INJECTION INTRAMUSCULAR; INTRAVENOUS; SUBCUTANEOUS
Qty: 0 | Refills: 0 | Status: DISCONTINUED | OUTPATIENT
Start: 2019-02-13 | End: 2019-02-13

## 2019-02-13 RX ORDER — METOPROLOL TARTRATE 50 MG
25 TABLET ORAL
Qty: 0 | Refills: 0 | Status: DISCONTINUED | OUTPATIENT
Start: 2019-02-13 | End: 2019-02-14

## 2019-02-13 RX ORDER — OXYCODONE HYDROCHLORIDE 5 MG/1
10 TABLET ORAL EVERY 4 HOURS
Qty: 0 | Refills: 0 | Status: DISCONTINUED | OUTPATIENT
Start: 2019-02-13 | End: 2019-02-14

## 2019-02-13 RX ADMIN — HYDROMORPHONE HYDROCHLORIDE 0.5 MILLIGRAM(S): 2 INJECTION INTRAMUSCULAR; INTRAVENOUS; SUBCUTANEOUS at 11:30

## 2019-02-13 RX ADMIN — HYDROMORPHONE HYDROCHLORIDE 0.5 MILLIGRAM(S): 2 INJECTION INTRAMUSCULAR; INTRAVENOUS; SUBCUTANEOUS at 14:38

## 2019-02-13 RX ADMIN — Medication 10 MILLIGRAM(S): at 21:15

## 2019-02-13 RX ADMIN — ONDANSETRON 4 MILLIGRAM(S): 8 TABLET, FILM COATED ORAL at 11:45

## 2019-02-13 RX ADMIN — HEPARIN SODIUM 5000 UNIT(S): 5000 INJECTION INTRAVENOUS; SUBCUTANEOUS at 21:15

## 2019-02-13 RX ADMIN — Medication 0.5 MILLIGRAM(S): at 21:58

## 2019-02-13 RX ADMIN — OXYCODONE HYDROCHLORIDE 10 MILLIGRAM(S): 5 TABLET ORAL at 21:17

## 2019-02-13 RX ADMIN — HYDROMORPHONE HYDROCHLORIDE 0.5 MILLIGRAM(S): 2 INJECTION INTRAMUSCULAR; INTRAVENOUS; SUBCUTANEOUS at 11:15

## 2019-02-13 RX ADMIN — HYDROMORPHONE HYDROCHLORIDE 0.5 MILLIGRAM(S): 2 INJECTION INTRAMUSCULAR; INTRAVENOUS; SUBCUTANEOUS at 14:48

## 2019-02-13 RX ADMIN — HYDROMORPHONE HYDROCHLORIDE 0.5 MILLIGRAM(S): 2 INJECTION INTRAMUSCULAR; INTRAVENOUS; SUBCUTANEOUS at 11:45

## 2019-02-13 RX ADMIN — ATORVASTATIN CALCIUM 80 MILLIGRAM(S): 80 TABLET, FILM COATED ORAL at 21:17

## 2019-02-13 RX ADMIN — HYDROMORPHONE HYDROCHLORIDE 0.5 MILLIGRAM(S): 2 INJECTION INTRAMUSCULAR; INTRAVENOUS; SUBCUTANEOUS at 12:00

## 2019-02-13 RX ADMIN — Medication 650 MILLIGRAM(S): at 23:52

## 2019-02-13 RX ADMIN — Medication 10 MILLIGRAM(S): at 12:00

## 2019-02-13 RX ADMIN — OXYCODONE HYDROCHLORIDE 5 MILLIGRAM(S): 5 TABLET ORAL at 18:56

## 2019-02-13 RX ADMIN — Medication 25 MILLIGRAM(S): at 18:51

## 2019-02-13 NOTE — DISCHARGE NOTE ADULT - PLAN OF CARE
post operative pain control, tolerate diet, local surgical incision wound care Follow up with Dr. Madrigal, Vascular Surgery, within 7 to 10 days.  Call [phone] for appointment.  Return to ER for temperatures greater than 101, chills sweats, pain not controlled with pain medications, persistent headaches, nausea and/or vomiting, asymmetrical weakness, neurological or mental status changes.  Please keep incision sites clean and dry, shower only.  Do not bathe or immerse incision sites in water for a prolonged amount of time.  May remove gauze dressing in 24hrs.  Steri-strips may fall of on their own in the shower. follow up Please follow up with your Primary Care Physician regarding your hospitalization, and schedule an appointment within two weeks after your discharge to review your hospital course.  Please  review all your current medications, and dose adjust as prescribed by your Primary Care Physician.  Call their office for appointment.

## 2019-02-13 NOTE — DISCHARGE NOTE ADULT - MEDICATION SUMMARY - MEDICATIONS TO TAKE
I will START or STAY ON the medications listed below when I get home from the hospital:    aspirin 81 mg oral delayed release tablet  -- 1 tab(s) by mouth once a day  -- Indication: For blood thinner    acetaminophen 325 mg oral tablet  -- 2 tab(s) by mouth every 6 hours, As needed, Mild Pain (1 - 3)  -- Indication: For pain    oxyCODONE 5 mg oral tablet  -- 1-2 tab(s) by mouth every 6-8 hours, As Needed -Moderate to Severe Pain MDD:4  -- Indication: For pain    atorvastatin 80 mg oral tablet  -- 1 tab(s) by mouth once a day (at bedtime)  -- Indication: For Cholesterol    Trilipix 135 mg oral delayed release capsule  -- 1 cap(s) by mouth once a day  -- Indication: For Cholesterol    Lotrel 10 mg-40 mg oral capsule  -- 1 cap(s) by mouth once a day  -- Indication: For blood pressure    Plavix 75 mg oral tablet  -- 1 tab(s) by mouth once a day  -- Indication: For antiplatelet    ALPRAZolam 0.5 mg oral tablet  -- 1 tab(s) by mouth once a day, As Needed  -- Indication: For mood    metoprolol tartrate 25 mg oral tablet  -- 1 tab(s) by mouth 2 times a day  -- Indication: For blood pressure    Fiber Tabs 625 mg oral tablet  -- 1 tab(s) by mouth once a day, As Needed  -- Indication: For Constipation    Probiotic Formula oral capsule  -- 1 cap(s) by mouth once a day  -- Indication: For digestion    Chantix 1 mg oral tablet  -- 1 tab(s) by mouth 2 times a day  -- Indication: For antismokin

## 2019-02-13 NOTE — DISCHARGE NOTE ADULT - CONDITIONS AT DISCHARGE
Pt. verbalized understanding of discharge instructions. Pt. alert and oriented x 4, ambulatory, voiding, tolerating diet, vss. Pt. verbalized understanding of medications prescribed and to follow up with MD in time ordered. IV lock removed and safety maintained. Pt & wife educated on the s&s of stroke

## 2019-02-13 NOTE — PRE-OP CHECKLIST - 1.
Preop teaching and emotional support provided to patient and family Zoltan: ENT scope: no supraglottic edema. Recommend decadron 10 mg Q 8 hrs. Discharge when more comfortable, less swelling, able to eat.

## 2019-02-13 NOTE — DISCHARGE NOTE ADULT - PATIENT PORTAL LINK FT
You can access the [facility] Patient Portal, offered by [facility], by registering with the following website: [email or web address]
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
## 2019-02-13 NOTE — BRIEF OPERATIVE NOTE - PROCEDURE
<<-----Click on this checkbox to enter Procedure Carotid endarterectomy  02/13/2019    Active  [id number]

## 2019-02-13 NOTE — DISCHARGE NOTE ADULT - NS AS DC STROKE ED MATERIALS
Call 911 for Stroke/Stroke Education Booklet/Stroke Warning Signs and Symptoms/Risk Factors for Stroke/Prescribed Medications/Need for Followup After Discharge Stroke Education Booklet/Stroke Warning Signs and Symptoms/Call 911 for Stroke/Risk Factors for Stroke/Prescribed Medications/Need for Followup After Discharge

## 2019-02-13 NOTE — DISCHARGE NOTE ADULT - HOSPITAL COURSE
Pt. is a 58 year old male who is a poor historian with PMH htn, hld, cardiac stents last placed 2013. Pt. was admitted to medical ICU in [facility] on November 30th for presumed TIA/stroke due to symptoms. CTA at that time was negative for hemorrhage TPA was given. Pt. seen by neurology and started on Keppra for possible seizure activity. Pt. signed out AMA the morning after. While admitted pt. was seen by vascular and had a carotid doppler that was significant for stenosis of the left Bilateral carotid arteries left greater than right. Pt. presents today for presurgical testing for Left carotid endarterectomy on 1/2/18. Pt. states feels well today denies any chest pain, SOB, fever, chills, N/V/D.     Pt. underwent left carotid endarterectomy. Pt. tolerated procedure well and was transferred to the recovery room where pt was closely managed for postoperative pain and blood pressure control, and then transferred to the floor without incidence.  Pt. was mainly managed for postoperative pain, wound care, as well as close monitoring of fluid resuscitation, neurological status and electrolyte repletion.  Pt has been tolerating a diet, voiding, ambulating, and the pain is now well controlled. Pt. is ready for discharge home in stable condition, and will follow up in two weeks as an outpatient with Dr. Madrigal.

## 2019-02-13 NOTE — DISCHARGE NOTE ADULT - CARE PLAN
Principal Discharge DX:	Carotid stenosis  Goal:	post operative pain control, tolerate diet, local surgical incision wound care  Assessment and plan of treatment:	Follow up with Dr. Madrigal, Vascular Surgery, within 7 to 10 days.  Call [phone] for appointment.  Return to ER for temperatures greater than 101, chills sweats, pain not controlled with pain medications, persistent headaches, nausea and/or vomiting, asymmetrical weakness, neurological or mental status changes.  Please keep incision sites clean and dry, shower only.  Do not bathe or immerse incision sites in water for a prolonged amount of time.  May remove gauze dressing in 24hrs.  Steri-strips may fall of on their own in the shower.  Secondary Diagnosis:	HLD (hyperlipidemia)  Goal:	follow up  Assessment and plan of treatment:	Please follow up with your Primary Care Physician regarding your hospitalization, and schedule an appointment within two weeks after your discharge to review your hospital course.  Please  review all your current medications, and dose adjust as prescribed by your Primary Care Physician.  Call their office for appointment.  Secondary Diagnosis:	HTN (hypertension)  Goal:	follow up  Assessment and plan of treatment:	Please follow up with your Primary Care Physician regarding your hospitalization, and schedule an appointment within two weeks after your discharge to review your hospital course.  Please  review all your current medications, and dose adjust as prescribed by your Primary Care Physician.  Call their office for appointment.

## 2019-02-13 NOTE — DISCHARGE NOTE ADULT - INSTRUCTIONS
DASH diet  Activity as tolerated. Avoid heavy lifting, no heavy exercise  or straining over 15 lbs for the next two weeks;  Driving- Please do not drive until your pain is well controlled and you do not need to take pain medications.  You may shower-Do not submerge or scrub incision sites.

## 2019-02-13 NOTE — DISCHARGE NOTE ADULT - CARE PROVIDER_API CALL
Pedro Madrigal)  Vascular Surgery  [address] [facility] [address] [place] [address]  Phone: [phone]  Fax: [phone]  Follow Up Time:

## 2019-02-14 VITALS
RESPIRATION RATE: 18 BRPM | HEART RATE: 78 BPM | DIASTOLIC BLOOD PRESSURE: 72 MMHG | TEMPERATURE: 97 F | OXYGEN SATURATION: 95 % | SYSTOLIC BLOOD PRESSURE: 138 MMHG

## 2019-02-14 LAB
ANION GAP SERPL CALC-SCNC: 15 MMOL/L — SIGNIFICANT CHANGE UP (ref 5–17)
BUN SERPL-MCNC: 16 MG/DL — SIGNIFICANT CHANGE UP (ref 7–23)
CALCIUM SERPL-MCNC: 10.2 MG/DL — SIGNIFICANT CHANGE UP (ref 8.4–10.5)
CHLORIDE SERPL-SCNC: 99 MMOL/L — SIGNIFICANT CHANGE UP (ref 96–108)
CO2 SERPL-SCNC: 22 MMOL/L — SIGNIFICANT CHANGE UP (ref 22–31)
CREAT SERPL-MCNC: 1.19 MG/DL — SIGNIFICANT CHANGE UP (ref 0.5–1.3)
GLUCOSE SERPL-MCNC: 115 MG/DL — HIGH (ref 70–99)
HCT VFR BLD CALC: 41.2 % — SIGNIFICANT CHANGE UP (ref 39–50)
HGB BLD-MCNC: 14.2 G/DL — SIGNIFICANT CHANGE UP (ref 13–17)
MCHC RBC-ENTMCNC: 32.8 PG — SIGNIFICANT CHANGE UP (ref 27–34)
MCHC RBC-ENTMCNC: 34.6 GM/DL — SIGNIFICANT CHANGE UP (ref 32–36)
MCV RBC AUTO: 94.9 FL — SIGNIFICANT CHANGE UP (ref 80–100)
PLATELET # BLD AUTO: 262 K/UL — SIGNIFICANT CHANGE UP (ref 150–400)
POTASSIUM SERPL-MCNC: 5.1 MMOL/L — SIGNIFICANT CHANGE UP (ref 3.5–5.3)
POTASSIUM SERPL-SCNC: 5.1 MMOL/L — SIGNIFICANT CHANGE UP (ref 3.5–5.3)
RBC # BLD: 4.34 M/UL — SIGNIFICANT CHANGE UP (ref 4.2–5.8)
RBC # FLD: 13.4 % — SIGNIFICANT CHANGE UP (ref 10.3–14.5)
SODIUM SERPL-SCNC: 136 MMOL/L — SIGNIFICANT CHANGE UP (ref 135–145)
WBC # BLD: 10.2 K/UL — SIGNIFICANT CHANGE UP (ref 3.8–10.5)
WBC # FLD AUTO: 10.2 K/UL — SIGNIFICANT CHANGE UP (ref 3.8–10.5)

## 2019-02-14 PROCEDURE — 85027 COMPLETE CBC AUTOMATED: CPT

## 2019-02-14 PROCEDURE — C1769: CPT

## 2019-02-14 PROCEDURE — 82962 GLUCOSE BLOOD TEST: CPT

## 2019-02-14 PROCEDURE — C1894: CPT

## 2019-02-14 PROCEDURE — 80048 BASIC METABOLIC PNL TOTAL CA: CPT

## 2019-02-14 PROCEDURE — 88311 DECALCIFY TISSUE: CPT

## 2019-02-14 PROCEDURE — 90686 IIV4 VACC NO PRSV 0.5 ML IM: CPT

## 2019-02-14 PROCEDURE — 88304 TISSUE EXAM BY PATHOLOGIST: CPT

## 2019-02-14 RX ORDER — OXYCODONE HYDROCHLORIDE 5 MG/1
1 TABLET ORAL
Qty: 16 | Refills: 0
Start: 2019-02-14 | End: 2019-02-17

## 2019-02-14 RX ORDER — ACETAMINOPHEN 500 MG
2 TABLET ORAL
Qty: 0 | Refills: 0 | DISCHARGE
Start: 2019-02-14

## 2019-02-14 RX ADMIN — Medication 650 MILLIGRAM(S): at 00:22

## 2019-02-14 RX ADMIN — HEPARIN SODIUM 5000 UNIT(S): 5000 INJECTION INTRAVENOUS; SUBCUTANEOUS at 05:54

## 2019-02-14 RX ADMIN — LISINOPRIL 40 MILLIGRAM(S): 2.5 TABLET ORAL at 13:04

## 2019-02-14 RX ADMIN — Medication 81 MILLIGRAM(S): at 13:04

## 2019-02-14 RX ADMIN — HEPARIN SODIUM 5000 UNIT(S): 5000 INJECTION INTRAVENOUS; SUBCUTANEOUS at 13:04

## 2019-02-14 RX ADMIN — AMLODIPINE BESYLATE 10 MILLIGRAM(S): 2.5 TABLET ORAL at 05:54

## 2019-02-14 RX ADMIN — INFLUENZA VIRUS VACCINE 0.5 MILLILITER(S): 15; 15; 15; 15 SUSPENSION INTRAMUSCULAR at 14:47

## 2019-02-14 RX ADMIN — Medication 25 MILLIGRAM(S): at 05:54

## 2019-02-14 NOTE — PROGRESS NOTE ADULT - SUBJECTIVE AND OBJECTIVE BOX
Surgery Progress Note    S: Patient seen and examined. Patient was doing well in PACU however resident was called o/n when patient was on the floor due to irritation and reports of the patient having a "panic attack." patient was immediately seen and evaluated. pt reported dissatisfaction with floor nurse over use of some reference guide book while in patient's room and apparent miscommunication regarding patient's diet. patient was counseled regarding post op course and tension was de-escalated. patient was noted at the time to be hypertensive to LWM=600, however was visibly upset and tense. patient takes 0.5mg xanax at home and he was given his home dose. BP was re-checked ~15 minutes after xanax and after de-escalation and CJS=370. this AM, patient reports his pain is well controlled. patient tolerating CLD without n/v and requesting regular diet.     O:  Vital Signs Last 24 Hrs  T(C): 36.6 (14 Feb 2019 01:30), Max: 36.6 (14 Feb 2019 01:30)  T(F): 97.8 (14 Feb 2019 01:30), Max: 97.8 (14 Feb 2019 01:30)  HR: 86 (14 Feb 2019 01:30) (60 - 93)  BP: 114/76 (14 Feb 2019 01:46) (109/74 - 183/97)  BP(mean): 108 (13 Feb 2019 20:00) (90 - 130)  RR: 18 (14 Feb 2019 01:30) (14 - 22)  SpO2: 97% (14 Feb 2019 01:30) (95% - 100%)    I&O's Detail    13 Feb 2019 07:01  -  14 Feb 2019 05:02  --------------------------------------------------------  IN:    lactated ringers.: 1950 mL    Oral Fluid: 600 mL  Total IN: 2550 mL    OUT:    Voided: 2600 mL  Total OUT: 2600 mL    Total NET: -50 mL          MEDICATIONS  (STANDING):  amLODIPine   Tablet 10 milliGRAM(s) Oral daily  aspirin enteric coated 81 milliGRAM(s) Oral daily  atorvastatin 80 milliGRAM(s) Oral at bedtime  heparin  Injectable 5000 Unit(s) SubCutaneous every 8 hours  influenza   Vaccine 0.5 milliLiter(s) IntraMuscular once  lactated ringers. 1000 milliLiter(s) (100 mL/Hr) IV Continuous <Continuous>  lisinopril 40 milliGRAM(s) Oral daily  metoprolol tartrate 25 milliGRAM(s) Oral two times a day    MEDICATIONS  (PRN):  acetaminophen   Tablet .. 650 milliGRAM(s) Oral every 6 hours PRN Mild Pain (1 - 3)  ALPRAZolam 0.5 milliGRAM(s) Oral daily PRN Anxiety, insomnia  hydrALAZINE Injectable 10 milliGRAM(s) IV Push every 3 hours PRN SBP >160  oxyCODONE    IR 5 milliGRAM(s) Oral every 4 hours PRN Moderate Pain (4 - 6)  oxyCODONE    IR 10 milliGRAM(s) Oral every 4 hours PRN Severe Pain (7 - 10)          Physical Exam:  Gen: Laying in bed, NAD  HEENT: CN II-XII grossly intact, left neck incision c/d/i, soft, NT, no surrounding erythema  Resp: Unlabored breathing  Abd: soft, NTND, no rebound or guarding  Ext: WWP  Neuro: motor and sensory grossly intact b/l throughout extremities

## 2019-02-14 NOTE — PROGRESS NOTE ADULT - ASSESSMENT
58y M with h/o HTN, HLD, cardiac stents (2013), with symptomatic carotid stenosis s/p L CEA (2/13)    Plan:  - Pain control as needed  - subq heparin for DVT ppx  - OOB and ambulating as tolerated  - F/u AM labs  - close neuro monitoring  - restart plavix today  - goal SBP=110-160  - c/w home meds  - advance to regular diet  - poss d/c later today    Vascular Surgery  [id number]

## 2019-02-14 NOTE — PROVIDER CONTACT NOTE (OTHER) - RECOMMENDATIONS
JANAY Dinero spoke with patient. Pt. stated "You guys are idiots, leave! I want to go back to the recovery room. The nurses knew what they were doing down there. I want to speak to the doctor."

## 2019-02-14 NOTE — PROVIDER CONTACT NOTE (OTHER) - SITUATION
Pt. appears in distress. Pt. threatening to leave AMA if diet not upgraded. Pt. states "I don't get why I cant eat and its making me angry. Im going to leave if they don't change my diet."

## 2019-02-14 NOTE — PROVIDER CONTACT NOTE (OTHER) - ACTION/TREATMENT ORDERED:
Hugo Gilbert MD notified. MD stated "Pt. may not be upgraded to regular until morning as precaution for potential OR if anything adverse occurs." Pt. educated on reason but still agitated.

## 2019-02-14 NOTE — PROVIDER CONTACT NOTE (OTHER) - ASSESSMENT
Neck swelling noted around CEA site. Neuro assessment done with patient with no defecits noted. Pt. appears agitated and frustrated.

## 2019-02-19 LAB — SURGICAL PATHOLOGY STUDY: SIGNIFICANT CHANGE UP

## 2019-03-04 ENCOUNTER — APPOINTMENT (OUTPATIENT)
Dept: CARDIOLOGY | Facility: CLINIC | Age: 59
End: 2019-03-04

## 2019-03-12 NOTE — H&P PST ADULT - NSALCOHOLTYPE_GEN__A_CORE_SD
Charles Light MD   You 36 minutes ago (7:33 AM)     Would have to see image to comment    Routing Comment         beer

## 2019-05-24 PROBLEM — M54.9 DORSALGIA, UNSPECIFIED: Chronic | Status: ACTIVE | Noted: 2019-02-08

## 2019-05-24 PROBLEM — M51.37 OTHER INTERVERTEBRAL DISC DEGENERATION, LUMBOSACRAL REGION: Chronic | Status: ACTIVE | Noted: 2019-02-08

## 2019-05-24 PROBLEM — T30.0 BURN OF UNSPECIFIED BODY REGION, UNSPECIFIED DEGREE: Chronic | Status: ACTIVE | Noted: 2019-02-08

## 2019-05-24 PROBLEM — H91.93 UNSPECIFIED HEARING LOSS, BILATERAL: Chronic | Status: ACTIVE | Noted: 2019-02-08
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
## 2019-06-07 ENCOUNTER — APPOINTMENT (OUTPATIENT)
Dept: VASCULAR SURGERY | Facility: CLINIC | Age: 59
End: 2019-06-07

## 2019-08-30 ENCOUNTER — APPOINTMENT (OUTPATIENT)
Dept: VASCULAR SURGERY | Facility: CLINIC | Age: 59
End: 2019-08-30
Payer: COMMERCIAL

## 2019-08-30 DIAGNOSIS — Z86.79 PERSONAL HISTORY OF OTHER DISEASES OF THE CIRCULATORY SYSTEM: ICD-10-CM

## 2019-08-30 DIAGNOSIS — I65.22 OCCLUSION AND STENOSIS OF LEFT CAROTID ARTERY: ICD-10-CM

## 2019-08-30 PROCEDURE — 99203 OFFICE O/P NEW LOW 30 MIN: CPT

## 2019-09-10 PROBLEM — Z86.79 HISTORY OF PERIPHERAL ARTERIAL DISEASE: Status: RESOLVED | Noted: 2019-09-10 | Resolved: 2019-09-10

## 2019-09-10 PROBLEM — I65.22 STENOSIS OF LEFT CAROTID ARTERY: Status: RESOLVED | Noted: 2019-09-10 | Resolved: 2019-09-10

## 2019-09-10 NOTE — HISTORY OF PRESENT ILLNESS
[FreeTextEntry1] : 60 y/o man with history of PAD, underwent iliac intervention and fem-fem bypass, comes for evaluation of persistent claudication symptoms. No tissue loss or ulceration. No fever or chills. Unable to walk more than 1/2 a block without having to stop. Comes to discuss CTA results and possible revascularization.

## 2019-09-10 NOTE — PHYSICAL EXAM
[Normal Breath Sounds] : Normal breath sounds [Normal Heart Sounds] : normal heart sounds [2+] : left 2+ [0] : left 0 [JVD] : no jugular venous distention  [Right Carotid Bruit] : no bruit heard over the right carotid [Carotid Bruits] : no carotid bruits [Left Carotid Bruit] : no bruit heard over the left carotid [Ankle Swelling (On Exam)] : not present [No Rash or Lesion] : No rash or lesion [Varicose Veins Of Lower Extremities] : not present [] : not present [Alert] : alert [Oriented to Person] : oriented to person [Oriented to Time] : oriented to time [Oriented to Place] : oriented to place [de-identified] : awake alert [Calm] : calm [de-identified] : MOM, PERRLA [de-identified] : no edema or cyanosis

## 2019-09-10 NOTE — REVIEW OF SYSTEMS
[Leg Claudication] : intermittent leg claudication [Limb Pain] : limb pain [Negative] : Psychiatric [Limb Swelling] : no limb swelling

## 2019-09-10 NOTE — ASSESSMENT
[FreeTextEntry1] : 58 y/o man with life-limiting claudication and multilevel disease that had fem-fem bypass and some intervention without significant relief. Discussed the possibility of neurogenic claudication, but because of bilateral SFA occlusions offered angiography with possible revascularization. The patient and his wife understand and agree. Will schedule angiogram in upcoming weeks. \par \par -Continue meds\par -Ambulation\par -Plan for angiogram\par -

## 2019-09-13 ENCOUNTER — OUTPATIENT (OUTPATIENT)
Dept: OUTPATIENT SERVICES | Facility: HOSPITAL | Age: 59
LOS: 1 days | End: 2019-09-13
Payer: COMMERCIAL

## 2019-09-13 VITALS
HEART RATE: 56 BPM | OXYGEN SATURATION: 100 % | TEMPERATURE: 98 F | DIASTOLIC BLOOD PRESSURE: 50 MMHG | RESPIRATION RATE: 14 BRPM | WEIGHT: 276.02 LBS | HEIGHT: 73 IN | SYSTOLIC BLOOD PRESSURE: 132 MMHG

## 2019-09-13 DIAGNOSIS — Z98.62 PERIPHERAL VASCULAR ANGIOPLASTY STATUS: Chronic | ICD-10-CM

## 2019-09-13 DIAGNOSIS — Z98.890 OTHER SPECIFIED POSTPROCEDURAL STATES: Chronic | ICD-10-CM

## 2019-09-13 DIAGNOSIS — Z96.60 PRESENCE OF UNSPECIFIED ORTHOPEDIC JOINT IMPLANT: Chronic | ICD-10-CM

## 2019-09-13 DIAGNOSIS — Z90.89 ACQUIRED ABSENCE OF OTHER ORGANS: Chronic | ICD-10-CM

## 2019-09-13 DIAGNOSIS — Z98.61 CORONARY ANGIOPLASTY STATUS: Chronic | ICD-10-CM

## 2019-09-13 DIAGNOSIS — Z90.49 ACQUIRED ABSENCE OF OTHER SPECIFIED PARTS OF DIGESTIVE TRACT: Chronic | ICD-10-CM

## 2019-09-13 DIAGNOSIS — Z98.89 OTHER SPECIFIED POSTPROCEDURAL STATES: Chronic | ICD-10-CM

## 2019-09-13 DIAGNOSIS — Z01.818 ENCOUNTER FOR OTHER PREPROCEDURAL EXAMINATION: ICD-10-CM

## 2019-09-13 LAB
ANION GAP SERPL CALC-SCNC: 7 MMOL/L — SIGNIFICANT CHANGE UP (ref 5–17)
APPEARANCE UR: CLEAR — SIGNIFICANT CHANGE UP
APTT BLD: 29.9 SEC — SIGNIFICANT CHANGE UP (ref 27.5–36.3)
BASOPHILS # BLD AUTO: 0.08 K/UL — SIGNIFICANT CHANGE UP (ref 0–0.2)
BASOPHILS NFR BLD AUTO: 0.8 % — SIGNIFICANT CHANGE UP (ref 0–2)
BILIRUB UR-MCNC: NEGATIVE — SIGNIFICANT CHANGE UP
BUN SERPL-MCNC: 21 MG/DL — SIGNIFICANT CHANGE UP (ref 7–23)
CALCIUM SERPL-MCNC: 9.4 MG/DL — SIGNIFICANT CHANGE UP (ref 8.5–10.1)
CHLORIDE SERPL-SCNC: 106 MMOL/L — SIGNIFICANT CHANGE UP (ref 96–108)
CO2 SERPL-SCNC: 22 MMOL/L — SIGNIFICANT CHANGE UP (ref 22–31)
COLOR SPEC: YELLOW — SIGNIFICANT CHANGE UP
CREAT SERPL-MCNC: 1.94 MG/DL — HIGH (ref 0.5–1.3)
DIFF PNL FLD: NEGATIVE — SIGNIFICANT CHANGE UP
EOSINOPHIL # BLD AUTO: 0.76 K/UL — HIGH (ref 0–0.5)
EOSINOPHIL NFR BLD AUTO: 7.5 % — HIGH (ref 0–6)
GLUCOSE SERPL-MCNC: 91 MG/DL — SIGNIFICANT CHANGE UP (ref 70–99)
GLUCOSE UR QL: 100 MG/DL
HCT VFR BLD CALC: 38 % — LOW (ref 39–50)
HGB BLD-MCNC: 12.4 G/DL — LOW (ref 13–17)
IMM GRANULOCYTES NFR BLD AUTO: 0.4 % — SIGNIFICANT CHANGE UP (ref 0–1.5)
INR BLD: 1.04 RATIO — SIGNIFICANT CHANGE UP (ref 0.88–1.16)
KETONES UR-MCNC: NEGATIVE — SIGNIFICANT CHANGE UP
LEUKOCYTE ESTERASE UR-ACNC: NEGATIVE — SIGNIFICANT CHANGE UP
LYMPHOCYTES # BLD AUTO: 1.2 K/UL — SIGNIFICANT CHANGE UP (ref 1–3.3)
LYMPHOCYTES # BLD AUTO: 11.8 % — LOW (ref 13–44)
MCHC RBC-ENTMCNC: 32.6 GM/DL — SIGNIFICANT CHANGE UP (ref 32–36)
MCHC RBC-ENTMCNC: 32.8 PG — SIGNIFICANT CHANGE UP (ref 27–34)
MCV RBC AUTO: 100.5 FL — HIGH (ref 80–100)
MONOCYTES # BLD AUTO: 0.62 K/UL — SIGNIFICANT CHANGE UP (ref 0–0.9)
MONOCYTES NFR BLD AUTO: 6.1 % — SIGNIFICANT CHANGE UP (ref 2–14)
NEUTROPHILS # BLD AUTO: 7.46 K/UL — HIGH (ref 1.8–7.4)
NEUTROPHILS NFR BLD AUTO: 73.4 % — SIGNIFICANT CHANGE UP (ref 43–77)
NITRITE UR-MCNC: NEGATIVE — SIGNIFICANT CHANGE UP
PH UR: 5 — SIGNIFICANT CHANGE UP (ref 5–8)
PLATELET # BLD AUTO: 268 K/UL — SIGNIFICANT CHANGE UP (ref 150–400)
POTASSIUM SERPL-MCNC: 4.6 MMOL/L — SIGNIFICANT CHANGE UP (ref 3.5–5.3)
POTASSIUM SERPL-SCNC: 4.6 MMOL/L — SIGNIFICANT CHANGE UP (ref 3.5–5.3)
PROT UR-MCNC: 30 MG/DL
PROTHROM AB SERPL-ACNC: 11.6 SEC — SIGNIFICANT CHANGE UP (ref 10–12.9)
RBC # BLD: 3.78 M/UL — LOW (ref 4.2–5.8)
RBC # FLD: 13.7 % — SIGNIFICANT CHANGE UP (ref 10.3–14.5)
SODIUM SERPL-SCNC: 135 MMOL/L — SIGNIFICANT CHANGE UP (ref 135–145)
SP GR SPEC: 1.02 — SIGNIFICANT CHANGE UP (ref 1.01–1.02)
UROBILINOGEN FLD QL: NEGATIVE MG/DL — SIGNIFICANT CHANGE UP
WBC # BLD: 10.16 K/UL — SIGNIFICANT CHANGE UP (ref 3.8–10.5)
WBC # FLD AUTO: 10.16 K/UL — SIGNIFICANT CHANGE UP (ref 3.8–10.5)

## 2019-09-13 PROCEDURE — 85610 PROTHROMBIN TIME: CPT

## 2019-09-13 PROCEDURE — 86901 BLOOD TYPING SEROLOGIC RH(D): CPT

## 2019-09-13 PROCEDURE — 85730 THROMBOPLASTIN TIME PARTIAL: CPT

## 2019-09-13 PROCEDURE — 86850 RBC ANTIBODY SCREEN: CPT

## 2019-09-13 PROCEDURE — 80048 BASIC METABOLIC PNL TOTAL CA: CPT

## 2019-09-13 PROCEDURE — 93010 ELECTROCARDIOGRAM REPORT: CPT

## 2019-09-13 PROCEDURE — 86900 BLOOD TYPING SEROLOGIC ABO: CPT

## 2019-09-13 PROCEDURE — 85025 COMPLETE CBC W/AUTO DIFF WBC: CPT

## 2019-09-13 PROCEDURE — 81001 URINALYSIS AUTO W/SCOPE: CPT

## 2019-09-13 PROCEDURE — 93005 ELECTROCARDIOGRAM TRACING: CPT

## 2019-09-13 PROCEDURE — 36415 COLL VENOUS BLD VENIPUNCTURE: CPT

## 2019-09-13 PROCEDURE — G0463: CPT | Mod: 25

## 2019-09-13 RX ORDER — AMLODIPINE BESYLATE AND BENAZEPRIL HYDROCHLORIDE 10; 20 MG/1; MG/1
1 CAPSULE ORAL
Qty: 0 | Refills: 0 | DISCHARGE

## 2019-09-13 RX ORDER — L.ACIDOPH/B.ANIMALIS/B.LONGUM 15B CELL
1 CAPSULE ORAL
Qty: 0 | Refills: 0 | DISCHARGE

## 2019-09-13 RX ORDER — ALPRAZOLAM 0.25 MG
1 TABLET ORAL
Qty: 0 | Refills: 0 | DISCHARGE

## 2019-09-13 NOTE — H&P PST ADULT - NSICDXPASTSURGICALHX_GEN_ALL_CORE_FT
PAST SURGICAL HISTORY:  H/O angioplasty right iliac vein 3/2019    H/O carotid endarterectomy left 2/2019    H/O coronary angioplasty stent 1995 2013    History of colon resection 2007 for diverticulitis    S/P bypass graft of extremity fem to fem  2004    S/P hip replacement left   2012    S/P tonsillectomy childhood
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
## 2019-09-13 NOTE — H&P PST ADULT - ASSESSMENT
yo scheduled for   with      1. Labs as per surgeon  2. EKG  3. Medical evaluation with  4. discussed EZ sponges, NPO after MN & PST day of procedure instructions  5. Instructed to increase po fluids the day before surgery. Instructed to hold aspirin, NSAIDs, fish oil, vitamins & herbal supplements 7 days prior to surgery  6. CXR 60 yo male scheduled for left lower extremity angiogram possible angioplasty with stent on 9/18/19   with Dr. Theresa Parikh    1. Labs as per surgeon  2. EKG  3. Medical evaluation with Dr Mendoza  4. discussed EZ sponges, NPO after MN & PST day of procedure instructions  5. Instructed to increase po fluids the day before surgery. Instructed to hold NSAIDs, fish oil, vitamins & herbal supplements 7 days prior to surgery  6. instructed to discuss preop instructions for plavix & aspirin with surgeon, wife & pt verbalized understanding  7. recommended sleep study  to r/o sleep apnea for STOP BANG score 5

## 2019-09-13 NOTE — H&P PST ADULT - HISTORY OF PRESENT ILLNESS
58 yo male presents to PST. c/o LLE pain with coldness & numbness. Reports having Doppler of LLE & MRA with Dr Cardenas. Denies h/o DVT. Pt currently on Plavix for fem to fem bypass. Reports Left lower leg pain 8/10

## 2019-09-13 NOTE — H&P PST ADULT - ENDOCRINE
----- Message from Della Hadley NP sent at 12/5/2018  4:17 PM CST -----  Reviewed; ferritin has increased but still below normal. Saturated iron low; recommend iron 325 mg po daily. Repeat iron, tibc, ferritin in 3 m.  
negative

## 2019-09-14 DIAGNOSIS — Z01.818 ENCOUNTER FOR OTHER PREPROCEDURAL EXAMINATION: ICD-10-CM

## 2019-09-23 ENCOUNTER — OUTPATIENT (OUTPATIENT)
Dept: INPATIENT UNIT | Facility: HOSPITAL | Age: 59
LOS: 1 days | Discharge: HOME | End: 2019-09-23
Payer: COMMERCIAL

## 2019-09-23 VITALS
OXYGEN SATURATION: 98 % | TEMPERATURE: 97 F | HEIGHT: 75 IN | RESPIRATION RATE: 16 BRPM | HEART RATE: 53 BPM | SYSTOLIC BLOOD PRESSURE: 108 MMHG | DIASTOLIC BLOOD PRESSURE: 64 MMHG | WEIGHT: 270.07 LBS

## 2019-09-23 VITALS
OXYGEN SATURATION: 99 % | DIASTOLIC BLOOD PRESSURE: 74 MMHG | TEMPERATURE: 98 F | SYSTOLIC BLOOD PRESSURE: 124 MMHG | HEART RATE: 58 BPM | RESPIRATION RATE: 16 BRPM

## 2019-09-23 DIAGNOSIS — E78.00 PURE HYPERCHOLESTEROLEMIA, UNSPECIFIED: ICD-10-CM

## 2019-09-23 DIAGNOSIS — I73.9 PERIPHERAL VASCULAR DISEASE, UNSPECIFIED: ICD-10-CM

## 2019-09-23 DIAGNOSIS — I10 ESSENTIAL (PRIMARY) HYPERTENSION: ICD-10-CM

## 2019-09-23 DIAGNOSIS — Z98.89 OTHER SPECIFIED POSTPROCEDURAL STATES: Chronic | ICD-10-CM

## 2019-09-23 DIAGNOSIS — Z98.61 CORONARY ANGIOPLASTY STATUS: Chronic | ICD-10-CM

## 2019-09-23 DIAGNOSIS — I70.213 ATHEROSCLEROSIS OF NATIVE ARTERIES OF EXTREMITIES WITH INTERMITTENT CLAUDICATION, BILATERAL LEGS: ICD-10-CM

## 2019-09-23 DIAGNOSIS — Z96.60 PRESENCE OF UNSPECIFIED ORTHOPEDIC JOINT IMPLANT: Chronic | ICD-10-CM

## 2019-09-23 DIAGNOSIS — Z98.890 OTHER SPECIFIED POSTPROCEDURAL STATES: Chronic | ICD-10-CM

## 2019-09-23 DIAGNOSIS — Z90.89 ACQUIRED ABSENCE OF OTHER ORGANS: Chronic | ICD-10-CM

## 2019-09-23 DIAGNOSIS — Z90.49 ACQUIRED ABSENCE OF OTHER SPECIFIED PARTS OF DIGESTIVE TRACT: Chronic | ICD-10-CM

## 2019-09-23 DIAGNOSIS — Z87.891 PERSONAL HISTORY OF NICOTINE DEPENDENCE: ICD-10-CM

## 2019-09-23 DIAGNOSIS — I25.10 ATHEROSCLEROTIC HEART DISEASE OF NATIVE CORONARY ARTERY WITHOUT ANGINA PECTORIS: ICD-10-CM

## 2019-09-23 DIAGNOSIS — Z98.62 PERIPHERAL VASCULAR ANGIOPLASTY STATUS: Chronic | ICD-10-CM

## 2019-09-23 LAB
ANION GAP SERPL CALC-SCNC: 7 MMOL/L — SIGNIFICANT CHANGE UP (ref 5–17)
APPEARANCE UR: CLEAR — SIGNIFICANT CHANGE UP
APTT BLD: 31 SEC — SIGNIFICANT CHANGE UP (ref 27.5–36.3)
BILIRUB UR-MCNC: NEGATIVE — SIGNIFICANT CHANGE UP
BUN SERPL-MCNC: 20 MG/DL — SIGNIFICANT CHANGE UP (ref 7–23)
CALCIUM SERPL-MCNC: 9.4 MG/DL — SIGNIFICANT CHANGE UP (ref 8.5–10.1)
CHLORIDE SERPL-SCNC: 109 MMOL/L — HIGH (ref 96–108)
CO2 SERPL-SCNC: 24 MMOL/L — SIGNIFICANT CHANGE UP (ref 22–31)
COLOR SPEC: YELLOW — SIGNIFICANT CHANGE UP
CREAT SERPL-MCNC: 1.81 MG/DL — HIGH (ref 0.5–1.3)
DIFF PNL FLD: NEGATIVE — SIGNIFICANT CHANGE UP
GLUCOSE SERPL-MCNC: 88 MG/DL — SIGNIFICANT CHANGE UP (ref 70–99)
GLUCOSE UR QL: NEGATIVE MG/DL — SIGNIFICANT CHANGE UP
HCT VFR BLD CALC: 38.5 % — LOW (ref 39–50)
HGB BLD-MCNC: 12.4 G/DL — LOW (ref 13–17)
INR BLD: 1.05 RATIO — SIGNIFICANT CHANGE UP (ref 0.88–1.16)
KETONES UR-MCNC: NEGATIVE — SIGNIFICANT CHANGE UP
LEUKOCYTE ESTERASE UR-ACNC: ABNORMAL
MCHC RBC-ENTMCNC: 32.2 GM/DL — SIGNIFICANT CHANGE UP (ref 32–36)
MCHC RBC-ENTMCNC: 32.4 PG — SIGNIFICANT CHANGE UP (ref 27–34)
MCV RBC AUTO: 100.5 FL — HIGH (ref 80–100)
NITRITE UR-MCNC: NEGATIVE — SIGNIFICANT CHANGE UP
PH UR: 5 — SIGNIFICANT CHANGE UP (ref 5–8)
PLATELET # BLD AUTO: 299 K/UL — SIGNIFICANT CHANGE UP (ref 150–400)
POTASSIUM SERPL-MCNC: 4.4 MMOL/L — SIGNIFICANT CHANGE UP (ref 3.5–5.3)
POTASSIUM SERPL-SCNC: 4.4 MMOL/L — SIGNIFICANT CHANGE UP (ref 3.5–5.3)
PROT UR-MCNC: 15 MG/DL
PROTHROM AB SERPL-ACNC: 11.7 SEC — SIGNIFICANT CHANGE UP (ref 10–12.9)
RBC # BLD: 3.83 M/UL — LOW (ref 4.2–5.8)
RBC # FLD: 13.7 % — SIGNIFICANT CHANGE UP (ref 10.3–14.5)
SODIUM SERPL-SCNC: 140 MMOL/L — SIGNIFICANT CHANGE UP (ref 135–145)
SP GR SPEC: 1.02 — SIGNIFICANT CHANGE UP (ref 1.01–1.02)
UROBILINOGEN FLD QL: NEGATIVE MG/DL — SIGNIFICANT CHANGE UP
WBC # BLD: 9.87 K/UL — SIGNIFICANT CHANGE UP (ref 3.8–10.5)
WBC # FLD AUTO: 9.87 K/UL — SIGNIFICANT CHANGE UP (ref 3.8–10.5)

## 2019-09-23 PROCEDURE — 85610 PROTHROMBIN TIME: CPT

## 2019-09-23 PROCEDURE — C1725: CPT

## 2019-09-23 PROCEDURE — 86850 RBC ANTIBODY SCREEN: CPT

## 2019-09-23 PROCEDURE — C1894: CPT

## 2019-09-23 PROCEDURE — C1875: CPT

## 2019-09-23 PROCEDURE — C1760: CPT

## 2019-09-23 PROCEDURE — 85730 THROMBOPLASTIN TIME PARTIAL: CPT

## 2019-09-23 PROCEDURE — 86901 BLOOD TYPING SEROLOGIC RH(D): CPT

## 2019-09-23 PROCEDURE — 85027 COMPLETE CBC AUTOMATED: CPT

## 2019-09-23 PROCEDURE — 80048 BASIC METABOLIC PNL TOTAL CA: CPT

## 2019-09-23 PROCEDURE — 81001 URINALYSIS AUTO W/SCOPE: CPT

## 2019-09-23 PROCEDURE — 76930: CPT

## 2019-09-23 PROCEDURE — 86900 BLOOD TYPING SEROLOGIC ABO: CPT

## 2019-09-23 PROCEDURE — C1887: CPT

## 2019-09-23 PROCEDURE — 36415 COLL VENOUS BLD VENIPUNCTURE: CPT

## 2019-09-23 PROCEDURE — 37226: CPT | Mod: LT

## 2019-09-23 PROCEDURE — C1769: CPT

## 2019-09-23 RX ORDER — SODIUM CHLORIDE 9 MG/ML
500 INJECTION INTRAMUSCULAR; INTRAVENOUS; SUBCUTANEOUS ONCE
Refills: 0 | Status: COMPLETED | OUTPATIENT
Start: 2019-09-23 | End: 2019-09-23

## 2019-09-23 RX ORDER — CLOPIDOGREL BISULFATE 75 MG/1
300 TABLET, FILM COATED ORAL ONCE
Refills: 0 | Status: COMPLETED | OUTPATIENT
Start: 2019-09-23 | End: 2019-09-23

## 2019-09-23 RX ADMIN — SODIUM CHLORIDE 1000 MILLILITER(S): 9 INJECTION INTRAMUSCULAR; INTRAVENOUS; SUBCUTANEOUS at 12:50

## 2019-09-23 RX ADMIN — CLOPIDOGREL BISULFATE 300 MILLIGRAM(S): 75 TABLET, FILM COATED ORAL at 12:41

## 2019-09-23 NOTE — ASU DISCHARGE PLAN (ADULT/PEDIATRIC) - CARE PROVIDER_API CALL
Kenji Iyer)  Surgery; Vascular Surgery  [address] [facility] [address], [address]st [place] [address]  Phone: [phone]  Fax: [phone]  Follow Up Time:

## 2019-09-23 NOTE — ASU PATIENT PROFILE, ADULT - PSH
H/O angioplasty  right iliac vein 3/2019  H/O carotid endarterectomy  left 2/2019  H/O coronary angioplasty  stent 1995 2013  History of colon resection  2007 for diverticulitis  S/P bypass graft of extremity  fem to fem  2004  S/P hip replacement  left   2012  S/P tonsillectomy  childhood

## 2019-09-23 NOTE — BRIEF OPERATIVE NOTE - OPERATION/FINDINGS
fem-fem bypass graft noted, stenosis at the anastomosis on the left  balloon angioplasty performed of left SFA with 5mm and then 6mm balloon  6mm stent placed at the distal SFA  completion angiogram showed good flow  closure with 5Fr Mynx closure device

## 2019-09-23 NOTE — ASU DISCHARGE PLAN (ADULT/PEDIATRIC) - NURSING INSTRUCTIONS
Refer to the multicolored fact sheet for any problems you experience.  If you have difficulty urinating or unable to urinate in 8 hours after surgery, call your Dr. or return to HH emergency room  Notify your Dr. if your fever is 101 or greater.  If the pain medicine your Dr. bermeo does not help you and you have severe pain call your Dr. [facility] patients should call the [facility]. If you cannot reach the doctor or clinic, call [facility] Emergency Department at [phone] or go to your local Emergency Department.  A responsible adult should be with you for the rest of the day and night for your safety and to help you if you needed.   Resume your medications as listed on the attached Medication Record.
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
## 2019-09-23 NOTE — ASU DISCHARGE PLAN (ADULT/PEDIATRIC) - CALL YOUR DOCTOR IF YOU HAVE ANY OF THE FOLLOWING:
Bleeding that does not stop/Wound/Surgical Site with redness, or foul smelling discharge or pus/Pain not relieved by Medications

## 2019-09-25 PROBLEM — M25.562 PAIN IN LEFT KNEE: Chronic | Status: ACTIVE | Noted: 2019-09-13

## 2019-09-25 PROBLEM — M25.571 PAIN IN RIGHT ANKLE AND JOINTS OF RIGHT FOOT: Chronic | Status: ACTIVE | Noted: 2019-09-13

## 2019-09-25 PROBLEM — Z65.4 VICTIM OF CRIME AND TERRORISM: Chronic | Status: ACTIVE | Noted: 2019-02-08

## 2019-09-25 PROBLEM — J43.9 EMPHYSEMA, UNSPECIFIED: Chronic | Status: ACTIVE | Noted: 2019-09-13

## 2019-09-25 PROBLEM — G47.33 OBSTRUCTIVE SLEEP APNEA (ADULT) (PEDIATRIC): Chronic | Status: ACTIVE | Noted: 2019-01-02

## 2019-09-25 PROBLEM — J44.9 CHRONIC OBSTRUCTIVE PULMONARY DISEASE, UNSPECIFIED: Chronic | Status: ACTIVE | Noted: 2019-09-13

## 2019-09-25 PROBLEM — Z86.79 PERSONAL HISTORY OF OTHER DISEASES OF THE CIRCULATORY SYSTEM: Chronic | Status: ACTIVE | Noted: 2019-09-13

## 2019-09-27 ENCOUNTER — APPOINTMENT (OUTPATIENT)
Dept: VASCULAR SURGERY | Facility: CLINIC | Age: 59
End: 2019-09-27
Payer: COMMERCIAL

## 2019-09-27 VITALS
OXYGEN SATURATION: 99 % | DIASTOLIC BLOOD PRESSURE: 76 MMHG | WEIGHT: 270 LBS | HEIGHT: 75 IN | HEART RATE: 63 BPM | SYSTOLIC BLOOD PRESSURE: 113 MMHG | BODY MASS INDEX: 33.57 KG/M2

## 2019-09-27 PROCEDURE — 99213 OFFICE O/P EST LOW 20 MIN: CPT

## 2019-09-27 PROCEDURE — 93926 LOWER EXTREMITY STUDY: CPT

## 2019-10-04 ENCOUNTER — APPOINTMENT (OUTPATIENT)
Dept: VASCULAR SURGERY | Facility: CLINIC | Age: 59
End: 2019-10-04
Payer: COMMERCIAL

## 2019-10-04 VITALS
DIASTOLIC BLOOD PRESSURE: 74 MMHG | OXYGEN SATURATION: 100 % | HEART RATE: 69 BPM | WEIGHT: 270 LBS | BODY MASS INDEX: 33.57 KG/M2 | SYSTOLIC BLOOD PRESSURE: 99 MMHG | HEIGHT: 75 IN | TEMPERATURE: 97.5 F

## 2019-10-04 PROCEDURE — 99213 OFFICE O/P EST LOW 20 MIN: CPT

## 2019-10-18 ENCOUNTER — APPOINTMENT (OUTPATIENT)
Dept: VASCULAR SURGERY | Facility: CLINIC | Age: 59
End: 2019-10-18

## 2020-01-02 NOTE — PHYSICAL EXAM
[JVD] : no jugular venous distention  [Normal Heart Sounds] : normal heart sounds [Carotid Bruits] : no carotid bruits [Normal Breath Sounds] : Normal breath sounds [Right Carotid Bruit] : no bruit heard over the right carotid [2+] : right 2+ [Left Carotid Bruit] : no bruit heard over the left carotid [0] : left 0 [Ankle Swelling (On Exam)] : not present [] : not present [Varicose Veins Of Lower Extremities] : not present [Alert] : alert [No Rash or Lesion] : No rash or lesion [Oriented to Place] : oriented to place [Oriented to Person] : oriented to person [Oriented to Time] : oriented to time [Calm] : calm [de-identified] : no edema or cyanosis [de-identified] : awake alert [de-identified] : MOM, PERRLA

## 2020-01-02 NOTE — ASSESSMENT
[FreeTextEntry1] : 60 y/o man with life-limiting claudication and multilevel disease that had fem-fem bypass and some intervention without significant relief. Discussed the possibility of neurogenic claudication, but because of bilateral SFA occlusions offered angiography with possible revascularization. The patient and his wife understand and agree. Circulation improved after angio, still with severe neuropathic pain. May need repeat angio to treat right leg.\par \par -Continue meds\par -Ambulation\par -RTC 6-12 weeks\par

## 2020-01-02 NOTE — HISTORY OF PRESENT ILLNESS
[FreeTextEntry1] : 60 y/o man with history of PAD, underwent iliac intervention and fem-fem bypass, comes for evaluation of persistent claudication symptoms. No tissue loss or ulceration. No fever or chills. Unable to walk more than 1/2 a block without having to stop. Comes to discuss CTA results and possible revascularization. Underwent angiogram with left leg revascularization, still with severe right leg pain.

## 2020-01-10 ENCOUNTER — APPOINTMENT (OUTPATIENT)
Dept: VASCULAR SURGERY | Facility: CLINIC | Age: 60
End: 2020-01-10

## 2020-02-18 ENCOUNTER — NON-APPOINTMENT (OUTPATIENT)
Age: 60
End: 2020-02-18

## 2020-02-18 ENCOUNTER — APPOINTMENT (OUTPATIENT)
Dept: CARDIOLOGY | Facility: CLINIC | Age: 60
End: 2020-02-18
Payer: COMMERCIAL

## 2020-02-18 VITALS
HEIGHT: 75 IN | SYSTOLIC BLOOD PRESSURE: 98 MMHG | BODY MASS INDEX: 33.57 KG/M2 | WEIGHT: 270 LBS | DIASTOLIC BLOOD PRESSURE: 68 MMHG | HEART RATE: 122 BPM

## 2020-02-18 PROCEDURE — 99214 OFFICE O/P EST MOD 30 MIN: CPT

## 2020-02-18 PROCEDURE — 93000 ELECTROCARDIOGRAM COMPLETE: CPT

## 2020-02-18 NOTE — DISCUSSION/SUMMARY
[FreeTextEntry1] : Patient is stable to undergo epidural injections.  Plavix may be held for 7 days prior to injection.

## 2020-02-18 NOTE — PHYSICAL EXAM
[Well Groomed] : well groomed [General Appearance - Well Developed] : well developed [Normal Appearance] : normal appearance [General Appearance - Well Nourished] : well nourished [General Appearance - In No Acute Distress] : no acute distress [No Deformities] : no deformities [Eyelids - No Xanthelasma] : the eyelids demonstrated no xanthelasmas [Normal Conjunctiva] : the conjunctiva exhibited no abnormalities [Normal Oral Mucosa] : normal oral mucosa [No Oral Cyanosis] : no oral cyanosis [No Oral Pallor] : no oral pallor [Normal Jugular Venous A Waves Present] : normal jugular venous A waves present [Normal Jugular Venous V Waves Present] : normal jugular venous V waves present [No Jugular Venous Richey A Waves] : no jugular venous richey A waves [Respiration, Rhythm And Depth] : normal respiratory rhythm and effort [Auscultation Breath Sounds / Voice Sounds] : lungs were clear to auscultation bilaterally [Exaggerated Use Of Accessory Muscles For Inspiration] : no accessory muscle use [Heart Rate And Rhythm] : heart rate and rhythm were normal [Heart Sounds] : normal S1 and S2 [Murmurs] : no murmurs present [Abdomen Tenderness] : non-tender [Abdomen Soft] : soft [Abdomen Mass (___ Cm)] : no abdominal mass palpated [Abnormal Walk] : normal gait [Gait - Sufficient For Exercise Testing] : the gait was sufficient for exercise testing [Nail Clubbing] : no clubbing of the fingernails [Cyanosis, Localized] : no localized cyanosis [Skin Color & Pigmentation] : normal skin color and pigmentation [Petechial Hemorrhages (___cm)] : no petechial hemorrhages [No Venous Stasis] : no venous stasis [] : no rash [Skin Lesions] : no skin lesions [No Skin Ulcers] : no skin ulcer [No Xanthoma] : no  xanthoma was observed [Affect] : the affect was normal [Oriented To Time, Place, And Person] : oriented to person, place, and time [Mood] : the mood was normal [No Anxiety] : not feeling anxious

## 2020-02-18 NOTE — HISTORY OF PRESENT ILLNESS
[FreeTextEntry1] : Patient with CAD and PVD.  He has a history of LAD stent x2.  No significant stenosses on cath in 1/2019.  Severe PVD and neuropathic pain.  scheduled for epidural injections.

## 2020-03-04 ENCOUNTER — EMERGENCY (EMERGENCY)
Facility: HOSPITAL | Age: 60
LOS: 1 days | Discharge: HOME | End: 2020-03-04
Attending: EMERGENCY MEDICINE
Payer: COMMERCIAL

## 2020-03-04 VITALS
WEIGHT: 278 LBS | RESPIRATION RATE: 20 BRPM | HEART RATE: 79 BPM | OXYGEN SATURATION: 96 % | DIASTOLIC BLOOD PRESSURE: 68 MMHG | SYSTOLIC BLOOD PRESSURE: 96 MMHG | TEMPERATURE: 98 F | HEIGHT: 75 IN

## 2020-03-04 VITALS
DIASTOLIC BLOOD PRESSURE: 79 MMHG | RESPIRATION RATE: 16 BRPM | SYSTOLIC BLOOD PRESSURE: 110 MMHG | OXYGEN SATURATION: 100 % | HEART RATE: 60 BPM | TEMPERATURE: 98 F

## 2020-03-04 DIAGNOSIS — Z96.60 PRESENCE OF UNSPECIFIED ORTHOPEDIC JOINT IMPLANT: Chronic | ICD-10-CM

## 2020-03-04 DIAGNOSIS — Z90.89 ACQUIRED ABSENCE OF OTHER ORGANS: Chronic | ICD-10-CM

## 2020-03-04 DIAGNOSIS — Z98.890 OTHER SPECIFIED POSTPROCEDURAL STATES: Chronic | ICD-10-CM

## 2020-03-04 DIAGNOSIS — Z90.49 ACQUIRED ABSENCE OF OTHER SPECIFIED PARTS OF DIGESTIVE TRACT: Chronic | ICD-10-CM

## 2020-03-04 DIAGNOSIS — Z98.61 CORONARY ANGIOPLASTY STATUS: Chronic | ICD-10-CM

## 2020-03-04 DIAGNOSIS — Z98.62 PERIPHERAL VASCULAR ANGIOPLASTY STATUS: Chronic | ICD-10-CM

## 2020-03-04 DIAGNOSIS — Z98.89 OTHER SPECIFIED POSTPROCEDURAL STATES: Chronic | ICD-10-CM

## 2020-03-04 PROCEDURE — 73630 X-RAY EXAM OF FOOT: CPT | Mod: 26,LT

## 2020-03-04 PROCEDURE — 73630 X-RAY EXAM OF FOOT: CPT

## 2020-03-04 PROCEDURE — 99283 EMERGENCY DEPT VISIT LOW MDM: CPT

## 2020-03-04 PROCEDURE — 99284 EMERGENCY DEPT VISIT MOD MDM: CPT

## 2020-03-04 NOTE — ED PROVIDER NOTE - PATIENT PORTAL LINK FT
You can access the FollowMyHealth Patient Portal offered by [facility] by registering at the following website: [email or web address]. By joining [employer]’s FollowMyHealth portal, you will also be able to view your health information using other applications (apps) compatible with our system.
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
## 2020-03-04 NOTE — ED ADULT NURSE NOTE - OBJECTIVE STATEMENT
Problem: Patient Care Overview  Goal: Plan of Care/Patient Progress Review  Outcome: No Change  [id number]: Pt nonverbal. Unable to assess orientation. Sometimes attempts to fight cares. VSS ex HTN. PRN hydralazine given for HTN. PRN IV dilaudid given per family request d/t possibly being uncomfortable. Suctioned multiple times d/t wet gurgling sounding cough. Pt frequently biting suction cathter. Hep gtt therapeutic at 700 units/hr. Turn q2h. Mitts on to prevent pulling. Virk with scant amount of output. Family at bedside. Will continue POC.     Problem: Feed Dysfunction/Swallow Impair (Infant)  Goal: Identify Related Risk Factors and Signs and Symptoms  Related risk factors and signs and symptoms are identified upon initiation of Human Response Clinical Practice Guideline (CPG).   Outcome: No Change   09/21/18 0406   Feed Dysfunction/Swallow Impair (Infant)   Related Risk Factors (Feeding Dysfunction) behavioral disorder;knowledge deficit;respiratory changes   Signs and Symptoms (Feeding Dysfunction) failure to gain weight/achieve appropriate growth velocity;food pocketing          59 y.o M A&Ox3 with PMH of htn, HLD, fem-fem bypass graft with stenosis s/p angioplasty last year of the LLE presents to the ED c.o redness to L foot and small hematoma to second digit s/p dropping hair dryer on foot this AM. Pt. reports pain to L foot after dropped hair dryer on foot. Reports slight difficulty ambulating due to discomfort, however, ambulates without assisted device. Wife called vascular MD due to redness and "black part of toe" and was advised to come in for an evaluation. Denies worsening pain, numbness, tingling, paresthesias, no coolness or cyanosis of the foot. Pedal pulses palpated bilaterally. Denies fevers, chills, CP, SOB, dizziness, HA, at this time. Rash noted to L inner thigh which he reports he gets every winter. Safety and comfort provided.

## 2020-03-04 NOTE — ED PROVIDER NOTE - ATTENDING CONTRIBUTION TO CARE
Dr Lindo Note: 58 yo M w pmhx htn, hld, LLE fem-fe, bypass 2004 Dr Lindo Note: 58 yo M w pmhx htn, hld, LLE fem-fem, bypass 2004 s/p angioplasty 1 year ago, currently following with Dr Pedro Madrigal vascular surgery for LLE pain for months and found to have stenosis of bypass and scheduled for evaluation with Dr Michael Maguire for outpatient 'lysis" as per wife in 2 days   Wife noticed "black" spot to patients left 2nd toe when she got home from work and called Dr Madrigal and was told to come to ED   After she called pt informed wife that he dropped a hair dryer on his foot     Pt states the pain to his left leg has been the same not worse, no new symptoms other then "black:  on toe which he states is same spot where he dropped a hair dryer     Gen: no acute distress non toxic alert and coherent, no cyanosis   Lungs: Air entry good, clear to auscultation and percussion   CVS: reg HR S1/S2 no murmur no gallop   ABD: +BS in all 4 quadrants, soft, non tender,  non distended, non palpable liver and spleen and no other masses. no hernias.  Extremities: No deformities, no edema, no calf tenderness, +Left leg warm, left DP pulse weakly palpated, +circular ecchymosis to left 2nd toe, tender to palpation,   Neuro: No focal deficits    Exam and hx most consistent with ecchymosis from injury   Tender over area- will get xray to r/o fx (low suspicion)   Pt without any worsening pain to LLE and no new neuro symptoms       I called Dr Sanders to communicate the exam and hx of trauma, waiting on call back

## 2020-03-04 NOTE — ED PROVIDER NOTE - NSFOLLOWUPINSTRUCTIONS_ED_ALL_ED_FT
- stay hydrated.   - take home medicatiosn as prescribed  - follow up with Dr. Winters this week as scheduled. follow up with Dr. Madrigal in the next 1-2 days.     - return if symptoms worsen, weakness, numbness/tingling, difficulty ambulating and all other concerns.
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
## 2020-03-04 NOTE — ED ADULT NURSE NOTE - NSFALLRSKUNASSIST_ED_ALL_ED
[facility]  Discharge Note  Short Stay    Admit Date: 4/5/2017    Discharge Date and Time: No discharge date for patient encounter.     Attending Physician: Vicky Saeed,*     Discharge Provider: Vicky Saeed    Diagnoses:  Active Hospital Problems    Diagnosis  POA    *Nephrolithiasis [N20.0]  Unknown      Resolved Hospital Problems    Diagnosis Date Resolved POA   No resolved problems to display.       Discharged Condition: good    Hospital Course: Patient was admitted for an outpatient procedure and tolerated the procedure well with no complications.    Final Diagnoses: Same as principal problem.    Disposition: Home or Self Care    Follow up/Patient Instructions:    Medications:  Reconciled Home Medications:   Current Discharge Medication List      START taking these medications    Details   ciprofloxacin HCl (CIPRO) 500 MG tablet Take 1 tablet (500 mg total) by mouth 2 (two) times daily.  Qty: 6 tablet, Refills: 0      hydrocodone-acetaminophen 5-325mg (NORCO) 5-325 mg per tablet Take 1 tablet by mouth every 6 (six) hours as needed for Pain.  Qty: 20 tablet, Refills: 0      tamsulosin (FLOMAX) 0.4 mg Cp24 Take 1 capsule (0.4 mg total) by mouth after dinner.  Qty: 30 capsule, Refills: 0         CONTINUE these medications which have NOT CHANGED    Details   testosterone (ANDROGEL) 20.25 mg/1.25 gram (1.62 %) GlPm Place 40.5 mg onto the skin once daily.  Qty: 75 g, Refills: 2      venlafaxine 225 mg TR24 Take 225 mg by mouth once daily.  Qty: 90 each, Refills: 3      VYVANSE 60 mg capsule Take 1 capsule (60 mg total) by mouth every morning.  Qty: 30 capsule, Refills: 0      testosterone cypionate (DEPOTESTOTERONE CYPIONATE) 200 mg/mL injection Inject 1 mL (200 mg total) into the muscle every 21 days.  Qty: 1 mL, Refills: 5             Discharge Procedure Orders  X-Ray Abdomen AP 1 View   Standing Status: Future  Standing Exp. Date: 04/05/18   Order Specific Question Answer Comments    Reason for Exam: left ureteral and renal stone s/p eswl (l2/l3)      Diet general     Activity as tolerated     Call MD for:  temperature >100.4           Discharge Procedure Orders (must include Diet, Follow-up, Activity):    Discharge Procedure Orders (must include Diet, Follow-up, Activity)  X-Ray Abdomen AP 1 View   Standing Status: Future  Standing Exp. Date: 04/05/18   Order Specific Question Answer Comments   Reason for Exam: left ureteral and renal stone s/p eswl (l2/l3)      Diet general     Activity as tolerated     Call MD for:  temperature >100.4         no

## 2020-03-04 NOTE — ED PROVIDER NOTE - OBJECTIVE STATEMENT
60 yo male with pmh htn. hld, fem-fem bypass graft with stenosis s/p angioplasty left year of the left leg presenting for concern of redness of toe of 60 yo male with pmh htn. hld, fem-fem bypass graft with stenosis s/p angioplasty left year of the left leg presenting for concern of redness of toe of the left foot s/p dropping hair dryer on foot yesterday. no worsening pain, no numbness, tingling, paresthesias, no coolness or cyanosis of the foot. PT states wife called their vascular Dr., Dr. Madrigal, told him that he "had a black part on the toe", and told patient to come to the ED.

## 2020-03-04 NOTE — ED ADULT NURSE NOTE - NSIMPLEMENTINTERV_GEN_ALL_ED
Implemented All Fall Risk Interventions:  [place] to call system. Call bell, personal items and telephone within reach. Instruct patient to call for assistance. Room bathroom lighting operational. Non-slip footwear when patient is off stretcher. Physically safe environment: no spills, clutter or unnecessary equipment. Stretcher in lowest position, wheels locked, appropriate side rails in place. Provide visual cue, wrist band, yellow gown, etc. Monitor gait and stability. Monitor for mental status changes and reorient to person, place, and time. Review medications for side effects contributing to fall risk. Reinforce activity limits and safety measures with patient and family.

## 2020-03-04 NOTE — ED PROVIDER NOTE - PHYSICAL EXAMINATION
A&Ox3, NAD. NCAT. PERRL, EOMI. Neck supple, no LAD. Lungs CTAB. +S1S2, RRR, No m/r/g. Abd soft, NT/ND, +BS, no rebound or guarding. Extremities: cap refill <2, pulses in lower distal extremities weak but palpable, no edema. left 2nd toe with circular area of redness Skin without rash. CN II-XII intact. Strength 5/5 UE/LE. Sensations intact throughout. Gait steady. A&Ox3, NAD. NCAT. PERRL, EOMI. Neck supple, no LAD. Lungs CTAB. +S1S2, RRR, No m/r/g. Abd soft, NT/ND, +BS, no rebound or guarding. Extremities: cap refill <2, pulses in lower distal extremities weak but palpable, no edema. left 2nd toe with circular area of nonblanchable redness Skin without rash. CN II-XII intact. Strength 5/5 UE/LE. Sensations intact throughout. Gait steady.

## 2020-03-06 ENCOUNTER — APPOINTMENT (OUTPATIENT)
Dept: VASCULAR SURGERY | Facility: CLINIC | Age: 60
End: 2020-03-06
Payer: COMMERCIAL

## 2020-03-06 VITALS
TEMPERATURE: 97.3 F | SYSTOLIC BLOOD PRESSURE: 108 MMHG | HEIGHT: 75 IN | HEART RATE: 62 BPM | DIASTOLIC BLOOD PRESSURE: 73 MMHG | BODY MASS INDEX: 34.82 KG/M2 | WEIGHT: 280 LBS

## 2020-03-06 PROCEDURE — 99214 OFFICE O/P EST MOD 30 MIN: CPT

## 2020-03-06 NOTE — ASSESSMENT
[FreeTextEntry1] : extensive counseling.  \par Pt w critical limb ischemia and rest pain.  He has a new traumatic wound, which may become a nonhealing wound/ulcer.\par I do not think he is a good candidate for aorto fem given cardiac hx as well as prior surgery.\par I do not think there is a good endovascular option given his vidal is small in caliber and chronically occluded at its origin.\par I think the best option would be to revise the fem fem, either with thrombectomy or complete revision, as well as left fem pop bypass.\par \par Given the severity of his symptoms I advised him to go to the ED for admission, heparin gtt, pain control, and revasc next week.

## 2020-03-06 NOTE — HISTORY OF PRESENT ILLNESS
[FreeTextEntry1] : 59M, w severe rest pain in the left foot.\par He underwent R-L fem fem 2004 and had been doing well.  last sept he had worsening pain in the foot, underwent angio and l sfa stent.  he did not have improvement, and progressively worsened.\par He now has had ~ 1 month severe rest pain in the left foot.  He dangles his foot to sleep.\par He takes oxycodone, which does help.  \par 2 days ago he dropped something on his toe and now has a blister on the toe.\par No fevers/chills\par \par + PCI stent x2\par + diverticulitis, s/p open sigmoid colectomy\par + CEA\par \par prior smoker, recently quit\par \par He is taking antiplatelet, he is on statin

## 2020-03-06 NOTE — PHYSICAL EXAM
[Normal Breath Sounds] : Normal breath sounds [Normal Heart Sounds] : normal heart sounds [2+] : right 2+ [0] : left 0 [de-identified] : awake, alert, in discomfort [de-identified] : L CEA incision [FreeTextEntry1] : palp R fem pulse.  well healed fem fem incisions, incision for ant exposure hip\par l foot delayed cap refill.  small blister mid toe, no signs of necrosis or infection

## 2020-03-14 ENCOUNTER — INPATIENT (INPATIENT)
Facility: HOSPITAL | Age: 60
LOS: 5 days | Discharge: HOME | DRG: 253 | End: 2020-03-20
Attending: STUDENT IN AN ORGANIZED HEALTH CARE EDUCATION/TRAINING PROGRAM | Admitting: STUDENT IN AN ORGANIZED HEALTH CARE EDUCATION/TRAINING PROGRAM
Payer: COMMERCIAL

## 2020-03-14 VITALS
HEIGHT: 75 IN | TEMPERATURE: 98 F | OXYGEN SATURATION: 100 % | SYSTOLIC BLOOD PRESSURE: 151 MMHG | WEIGHT: 270.07 LBS | HEART RATE: 78 BPM | RESPIRATION RATE: 20 BRPM | DIASTOLIC BLOOD PRESSURE: 87 MMHG

## 2020-03-14 DIAGNOSIS — Z90.89 ACQUIRED ABSENCE OF OTHER ORGANS: Chronic | ICD-10-CM

## 2020-03-14 DIAGNOSIS — M79.605 PAIN IN LEFT LEG: ICD-10-CM

## 2020-03-14 DIAGNOSIS — Z98.61 CORONARY ANGIOPLASTY STATUS: Chronic | ICD-10-CM

## 2020-03-14 DIAGNOSIS — Z96.60 PRESENCE OF UNSPECIFIED ORTHOPEDIC JOINT IMPLANT: Chronic | ICD-10-CM

## 2020-03-14 DIAGNOSIS — Z90.49 ACQUIRED ABSENCE OF OTHER SPECIFIED PARTS OF DIGESTIVE TRACT: Chronic | ICD-10-CM

## 2020-03-14 DIAGNOSIS — Z98.62 PERIPHERAL VASCULAR ANGIOPLASTY STATUS: Chronic | ICD-10-CM

## 2020-03-14 DIAGNOSIS — Z98.89 OTHER SPECIFIED POSTPROCEDURAL STATES: Chronic | ICD-10-CM

## 2020-03-14 DIAGNOSIS — Z98.890 OTHER SPECIFIED POSTPROCEDURAL STATES: Chronic | ICD-10-CM

## 2020-03-14 LAB
ALBUMIN SERPL ELPH-MCNC: 3.9 G/DL — SIGNIFICANT CHANGE UP (ref 3.3–5)
ALP SERPL-CCNC: 53 U/L — SIGNIFICANT CHANGE UP (ref 40–120)
ALT FLD-CCNC: 25 U/L — SIGNIFICANT CHANGE UP (ref 10–45)
ANION GAP SERPL CALC-SCNC: 14 MMOL/L — SIGNIFICANT CHANGE UP (ref 5–17)
APTT BLD: 29.4 SEC — SIGNIFICANT CHANGE UP (ref 27.5–36.3)
AST SERPL-CCNC: 28 U/L — SIGNIFICANT CHANGE UP (ref 10–40)
BASOPHILS # BLD AUTO: 0.07 K/UL — SIGNIFICANT CHANGE UP (ref 0–0.2)
BASOPHILS NFR BLD AUTO: 0.7 % — SIGNIFICANT CHANGE UP (ref 0–2)
BILIRUB SERPL-MCNC: 0.5 MG/DL — SIGNIFICANT CHANGE UP (ref 0.2–1.2)
BLD GP AB SCN SERPL QL: NEGATIVE — SIGNIFICANT CHANGE UP
BUN SERPL-MCNC: 13 MG/DL — SIGNIFICANT CHANGE UP (ref 7–23)
CALCIUM SERPL-MCNC: 9.5 MG/DL — SIGNIFICANT CHANGE UP (ref 8.4–10.5)
CHLORIDE SERPL-SCNC: 104 MMOL/L — SIGNIFICANT CHANGE UP (ref 96–108)
CO2 SERPL-SCNC: 20 MMOL/L — LOW (ref 22–31)
CREAT SERPL-MCNC: 1.27 MG/DL — SIGNIFICANT CHANGE UP (ref 0.5–1.3)
EOSINOPHIL # BLD AUTO: 0.53 K/UL — HIGH (ref 0–0.5)
EOSINOPHIL NFR BLD AUTO: 5.3 % — SIGNIFICANT CHANGE UP (ref 0–6)
GAS PNL BLDV: SIGNIFICANT CHANGE UP
GLUCOSE SERPL-MCNC: 86 MG/DL — SIGNIFICANT CHANGE UP (ref 70–99)
HCT VFR BLD CALC: 39.5 % — SIGNIFICANT CHANGE UP (ref 39–50)
HGB BLD-MCNC: 12.3 G/DL — LOW (ref 13–17)
IMM GRANULOCYTES NFR BLD AUTO: 0.3 % — SIGNIFICANT CHANGE UP (ref 0–1.5)
INR BLD: 1.09 RATIO — SIGNIFICANT CHANGE UP (ref 0.88–1.16)
LYMPHOCYTES # BLD AUTO: 1.25 K/UL — SIGNIFICANT CHANGE UP (ref 1–3.3)
LYMPHOCYTES # BLD AUTO: 12.5 % — LOW (ref 13–44)
MCHC RBC-ENTMCNC: 31.1 GM/DL — LOW (ref 32–36)
MCHC RBC-ENTMCNC: 31.2 PG — SIGNIFICANT CHANGE UP (ref 27–34)
MCV RBC AUTO: 100.3 FL — HIGH (ref 80–100)
MONOCYTES # BLD AUTO: 0.67 K/UL — SIGNIFICANT CHANGE UP (ref 0–0.9)
MONOCYTES NFR BLD AUTO: 6.7 % — SIGNIFICANT CHANGE UP (ref 2–14)
NEUTROPHILS # BLD AUTO: 7.49 K/UL — HIGH (ref 1.8–7.4)
NEUTROPHILS NFR BLD AUTO: 74.5 % — SIGNIFICANT CHANGE UP (ref 43–77)
NRBC # BLD: 0 /100 WBCS — SIGNIFICANT CHANGE UP (ref 0–0)
PLATELET # BLD AUTO: 249 K/UL — SIGNIFICANT CHANGE UP (ref 150–400)
POTASSIUM SERPL-MCNC: 3.9 MMOL/L — SIGNIFICANT CHANGE UP (ref 3.5–5.3)
POTASSIUM SERPL-SCNC: 3.9 MMOL/L — SIGNIFICANT CHANGE UP (ref 3.5–5.3)
PROT SERPL-MCNC: 7.1 G/DL — SIGNIFICANT CHANGE UP (ref 6–8.3)
PROTHROM AB SERPL-ACNC: 12.6 SEC — SIGNIFICANT CHANGE UP (ref 10–12.9)
RBC # BLD: 3.94 M/UL — LOW (ref 4.2–5.8)
RBC # FLD: 12.6 % — SIGNIFICANT CHANGE UP (ref 10.3–14.5)
RH IG SCN BLD-IMP: POSITIVE — SIGNIFICANT CHANGE UP
SODIUM SERPL-SCNC: 138 MMOL/L — SIGNIFICANT CHANGE UP (ref 135–145)
WBC # BLD: 10.04 K/UL — SIGNIFICANT CHANGE UP (ref 3.8–10.5)
WBC # FLD AUTO: 10.04 K/UL — SIGNIFICANT CHANGE UP (ref 3.8–10.5)

## 2020-03-14 PROCEDURE — 99221 1ST HOSP IP/OBS SF/LOW 40: CPT | Mod: GC

## 2020-03-14 PROCEDURE — 99284 EMERGENCY DEPT VISIT MOD MDM: CPT

## 2020-03-14 PROCEDURE — 75635 CT ANGIO ABDOMINAL ARTERIES: CPT | Mod: 26

## 2020-03-14 RX ORDER — HEPARIN SODIUM 5000 [USP'U]/ML
9500 INJECTION INTRAVENOUS; SUBCUTANEOUS EVERY 6 HOURS
Refills: 0 | Status: DISCONTINUED | OUTPATIENT
Start: 2020-03-14 | End: 2020-03-15

## 2020-03-14 RX ORDER — HEPARIN SODIUM 5000 [USP'U]/ML
INJECTION INTRAVENOUS; SUBCUTANEOUS
Qty: 25000 | Refills: 0 | Status: DISCONTINUED | OUTPATIENT
Start: 2020-03-14 | End: 2020-03-14

## 2020-03-14 RX ORDER — HYDROMORPHONE HYDROCHLORIDE 2 MG/ML
1 INJECTION INTRAMUSCULAR; INTRAVENOUS; SUBCUTANEOUS EVERY 6 HOURS
Refills: 0 | Status: DISCONTINUED | OUTPATIENT
Start: 2020-03-14 | End: 2020-03-15

## 2020-03-14 RX ORDER — CLOPIDOGREL BISULFATE 75 MG/1
75 TABLET, FILM COATED ORAL DAILY
Refills: 0 | Status: DISCONTINUED | OUTPATIENT
Start: 2020-03-14 | End: 2020-03-18

## 2020-03-14 RX ORDER — GABAPENTIN 400 MG/1
1 CAPSULE ORAL
Qty: 0 | Refills: 0 | DISCHARGE

## 2020-03-14 RX ORDER — SODIUM CHLORIDE 9 MG/ML
1000 INJECTION INTRAMUSCULAR; INTRAVENOUS; SUBCUTANEOUS ONCE
Refills: 0 | Status: COMPLETED | OUTPATIENT
Start: 2020-03-14 | End: 2020-03-14

## 2020-03-14 RX ORDER — LISINOPRIL 2.5 MG/1
40 TABLET ORAL DAILY
Refills: 0 | Status: DISCONTINUED | OUTPATIENT
Start: 2020-03-14 | End: 2020-03-18

## 2020-03-14 RX ORDER — HEPARIN SODIUM 5000 [USP'U]/ML
9500 INJECTION INTRAVENOUS; SUBCUTANEOUS ONCE
Refills: 0 | Status: COMPLETED | OUTPATIENT
Start: 2020-03-14 | End: 2020-03-14

## 2020-03-14 RX ORDER — ATORVASTATIN CALCIUM 80 MG/1
80 TABLET, FILM COATED ORAL AT BEDTIME
Refills: 0 | Status: DISCONTINUED | OUTPATIENT
Start: 2020-03-14 | End: 2020-03-18

## 2020-03-14 RX ORDER — HEPARIN SODIUM 5000 [USP'U]/ML
4500 INJECTION INTRAVENOUS; SUBCUTANEOUS EVERY 6 HOURS
Refills: 0 | Status: DISCONTINUED | OUTPATIENT
Start: 2020-03-14 | End: 2020-03-15

## 2020-03-14 RX ORDER — AMLODIPINE BESYLATE 2.5 MG/1
5 TABLET ORAL DAILY
Refills: 0 | Status: DISCONTINUED | OUTPATIENT
Start: 2020-03-14 | End: 2020-03-18

## 2020-03-14 RX ORDER — HYDROMORPHONE HYDROCHLORIDE 2 MG/ML
0.5 INJECTION INTRAMUSCULAR; INTRAVENOUS; SUBCUTANEOUS ONCE
Refills: 0 | Status: DISCONTINUED | OUTPATIENT
Start: 2020-03-14 | End: 2020-03-14

## 2020-03-14 RX ORDER — HEPARIN SODIUM 5000 [USP'U]/ML
2100 INJECTION INTRAVENOUS; SUBCUTANEOUS
Qty: 25000 | Refills: 0 | Status: DISCONTINUED | OUTPATIENT
Start: 2020-03-14 | End: 2020-03-15

## 2020-03-14 RX ORDER — HYDROMORPHONE HYDROCHLORIDE 2 MG/ML
0.5 INJECTION INTRAMUSCULAR; INTRAVENOUS; SUBCUTANEOUS EVERY 6 HOURS
Refills: 0 | Status: DISCONTINUED | OUTPATIENT
Start: 2020-03-14 | End: 2020-03-15

## 2020-03-14 RX ORDER — MORPHINE SULFATE 50 MG/1
4 CAPSULE, EXTENDED RELEASE ORAL ONCE
Refills: 0 | Status: DISCONTINUED | OUTPATIENT
Start: 2020-03-14 | End: 2020-03-14

## 2020-03-14 RX ORDER — ACETAMINOPHEN 500 MG
1000 TABLET ORAL EVERY 6 HOURS
Refills: 0 | Status: DISCONTINUED | OUTPATIENT
Start: 2020-03-14 | End: 2020-03-15

## 2020-03-14 RX ORDER — SODIUM CHLORIDE 9 MG/ML
1000 INJECTION, SOLUTION INTRAVENOUS
Refills: 0 | Status: DISCONTINUED | OUTPATIENT
Start: 2020-03-14 | End: 2020-03-15

## 2020-03-14 RX ORDER — METOPROLOL TARTRATE 50 MG
25 TABLET ORAL
Refills: 0 | Status: DISCONTINUED | OUTPATIENT
Start: 2020-03-14 | End: 2020-03-18

## 2020-03-14 RX ADMIN — SODIUM CHLORIDE 1000 MILLILITER(S): 9 INJECTION INTRAMUSCULAR; INTRAVENOUS; SUBCUTANEOUS at 19:18

## 2020-03-14 RX ADMIN — HEPARIN SODIUM 2100 UNIT(S)/HR: 5000 INJECTION INTRAVENOUS; SUBCUTANEOUS at 18:08

## 2020-03-14 RX ADMIN — MORPHINE SULFATE 4 MILLIGRAM(S): 50 CAPSULE, EXTENDED RELEASE ORAL at 17:51

## 2020-03-14 RX ADMIN — HYDROMORPHONE HYDROCHLORIDE 0.5 MILLIGRAM(S): 2 INJECTION INTRAMUSCULAR; INTRAVENOUS; SUBCUTANEOUS at 20:06

## 2020-03-14 RX ADMIN — Medication 25 MILLIGRAM(S): at 23:35

## 2020-03-14 RX ADMIN — Medication 400 MILLIGRAM(S): at 23:34

## 2020-03-14 RX ADMIN — ATORVASTATIN CALCIUM 80 MILLIGRAM(S): 80 TABLET, FILM COATED ORAL at 23:34

## 2020-03-14 RX ADMIN — HEPARIN SODIUM 9500 UNIT(S): 5000 INJECTION INTRAVENOUS; SUBCUTANEOUS at 18:08

## 2020-03-14 RX ADMIN — HYDROMORPHONE HYDROCHLORIDE 1 MILLIGRAM(S): 2 INJECTION INTRAMUSCULAR; INTRAVENOUS; SUBCUTANEOUS at 23:35

## 2020-03-14 NOTE — H&P ADULT - HISTORY OF PRESENT ILLNESS
59M PMHx CAD s/p stent x2 to LAD with negative cath 2019 per chart review, HTN, HLD, COPD, PAD s/p R-->L fem-fem ('04) & left SFA stent ('19), former 1 ppd x30 year smoker now presenting to the ED c/o severe worsening RLE x2 days. Patient reports hes been having rest pain for over a month in his left foot, but that in the last two days the pain has increased significantly. Its now a 10/10, constant, worst in the foot. He's also experienced some waxing/waning erythema over the foot and lower leg. Patient denied CP, SOB, abdominal pain, N/V/D, cool extremities, fever, numbness

## 2020-03-14 NOTE — ED PROVIDER NOTE - ATTENDING CONTRIBUTION TO CARE
I performed a history and physical exam of the patient and discussed their management with the advanced care provider. I reviewed the advanced care provider's note and agree with the documented findings and plan of care. My medical decision making and objective findings are found above.     58 y/o male with hx of CAD, hx of fem-fem bypass and iliac intervention found to be occluded, scheduled for surgery on 4/1 now with worsening pain past two days, no help with oxycodone, no fever, erythema improved but still there, no fever, no chills, no n/v/d, no SOB, no CP, no abdominal pain, on exam pt with LLE redness, tenderness, unable to get pulses doppler used no pulses, 1)Vascular paged 2)CBC, CMP, PT/PTT, T&S, 3)CTA lower ext 4)Heparin drip 5)admit

## 2020-03-14 NOTE — ED PROVIDER NOTE - OBJECTIVE STATEMENT
60 y/o male PMHx CAD s/p stents to LAD with negative cath 2019 per chart review, HTN, HLD, COPD, PAD s/p underwent iliac intervention and fem-fem bypass 58 y/o male PMHx CAD s/p stents to LAD with negative cath 2019 per chart review, HTN, HLD, COPD, PAD s/p underwent iliac intervention and fem-fem bypass now with planned fem-fem bypass again on 4/1/2020 with Dr. Maguire , former 1 ppd x30 year smoker now presenting to the ED c/o severe worsening RLE x2 days. Patient reported pain with ambulation, redness to RLE, 10/10 pain, and no improvement of pain with oxycodone 20mg taken once daily (last night). Patient denied CP, SOB, abdominal pain, N/V/D, cool extremities, fever, numbness

## 2020-03-14 NOTE — ED ADULT NURSE NOTE - CHIEF COMPLAINT QUOTE
pt scheduled for vascular surgery to [facility] leg 4/1 pt states pain worsening pt took oxycodone 10mg last taKen last night no pain meds taken today

## 2020-03-14 NOTE — ED PROVIDER NOTE - PROGRESS NOTE DETAILS
PAULINE Pednleton: patient seen by resident Rajan who recommended CTA as he cannot obtain a pulse and recommended to start heparin. obtained actual weight and ordered heparin

## 2020-03-14 NOTE — ED PROVIDER NOTE - PHYSICAL EXAMINATION
Neuro: sensation intact to bilateral lower extremities  Skin: erythema to RLE Neuro: sensation intact to bilateral lower extremities  Skin: erythema to RLE  Vasc: could not obtain palpable pulses on LLE and could not obtain pulse with US

## 2020-03-14 NOTE — H&P ADULT - NSHPPHYSICALEXAM_GEN_ALL_CORE
Gen: AAOx3, NAD, mentating normally  Neuro: Cranial nerves II-XII grossly intact  HEENT: Atraumatic, normocephalic  CV: RRR, normal S1/S2, no audible m/r/g  Pulm: Breathing comfortably on RA. Equal chest rise b/l. Lung fields CTAB  Abd: Soft, non-tender, non-distended. No rebound or guarding.  LLE: Erythema at the foot, very tender to palpation. Reduced sensation on the left foot compared to the right. Motor intact.  Vascular: Left- No DP, PT, peroneal, or popliteal signals present. Femoral signal present.                Right- DP/PT signals present  Back/flank: No CVA tenderness  Extremities: WWP. Moving all 4 extremities spontaneously. Strength 5/5. Sensation intact  Skin: No rashes or suspicious lesions

## 2020-03-14 NOTE — H&P ADULT - ATTENDING COMMENTS
known to me.  He was scheduled for surgery in 2 weeks however presents with worsening rest pain.  no wounds.    anticoagulation  clearance, plan for revision fem fem, and left fem pop bypass
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
## 2020-03-14 NOTE — ED PROVIDER NOTE - SEVERE SEPSIS ALERT DETAILS
PAULINE Pendleton I have seen and evaluated this patient and do not believe the patient is septic at this time.  I will continue to evaluate.

## 2020-03-14 NOTE — H&P ADULT - NSHPLABSRESULTS_GEN_ALL_CORE
CBC (03-14 @ 17:57)                              12.3<L>                         10.04   )----------------(  249        74.5  % Neutrophils, 12.5<L>% Lymphocytes, ANC: 7.49<H>                              39.5      BMP (03-14 @ 17:57)             138     |  104     |  13    		Ca++ --      Ca 9.5                ---------------------------------( 86    		Mg --                 3.9     |  20<L>   |  1.27  			Ph --        LFTs (03-14 @ 17:57)      TPro 7.1 / Alb 3.9 / TBili 0.5 / DBili -- / AST 28 / ALT 25 / AlkPhos 53    Coags (03-14 @ 17:57)  aPTT 29.4 / INR 1.09 / PT 12.6        VBG (03-14 @ 17:57)     7.41 / 37 / 36 / 23 / -0.8 / 62<L>%     Lactate: 2.1<H>        CTA pending

## 2020-03-14 NOTE — ED ADULT NURSE NOTE - NSIMPLEMENTINTERV_GEN_ALL_ED
Implemented All Fall Risk Interventions:  [place] to call system. Call bell, personal items and telephone within reach. Instruct patient to call for assistance. Room bathroom lighting operational. Non-slip footwear when patient is off stretcher. Physically safe environment: no spills, clutter or unnecessary equipment. Stretcher in lowest position, wheels locked, appropriate side rails in place. Provide visual cue, wrist band, yellow gown, etc. Monitor gait and stability. Monitor for mental status changes and reorient to person, place, and time. Review medications for side effects contributing to fall risk. Reinforce activity limits and safety measures with patient and family.

## 2020-03-14 NOTE — ED PROVIDER NOTE - CLINICAL SUMMARY MEDICAL DECISION MAKING FREE TEXT BOX
DEYVI: 60 y/o male with hx of CAD, hx of fem-fem bypass and iliac intervention found to be occluded, scheduled for surgery on 4/1 now with worsening pain past two days, no help with oxycodone, no fever, erythema improved but still there, no fever, no chills, no n/v/d, no SOB, no CP, no abdominal pain, on exam pt with LLE redness, tenderness, unable to get pulses doppler used no pulses, 1)Vascular paged 2)CBC, CMP, PT/PTT, T&S, 3)CTA lower ext 4)Heparin drip 5)admit

## 2020-03-14 NOTE — ED ADULT NURSE NOTE - OBJECTIVE STATEMENT
59M comes to ED c/o 2 days of excruciating leg pain. States he has "vascular issues" normally, but this is an exacerbation of his typical pain. Is scheduled for surgery on 4/1 (left fem bypass and stent placement) but states he can not wait until then. He is able to ambulate but with extreme pain. He has baseline numbness/tingling to affected leg, but it has worsened over the past 2 days. He is a&ox3 visibly uncomfortable. Denies SOB/chest pain/N/V/Dizziness/abdominal pain/pain in right leg/pain in arms. States the pain is the entire left leg worse in the foot. PMH PVD, HTN, HLD. Left foot appears more red than right, sensation intact bilaterally, no change in temperature in both feet, motor function intact bilaterally. Will continue to monitor. 59M comes to ED c/o 2 days of excruciating leg pain. States he has "vascular issues" normally, but this is an exacerbation of his typical pain. Is scheduled for surgery on 4/1 (left fem bypass and stent placement) but states he can not wait until then. He is able to ambulate but with extreme pain. He has baseline numbness/tingling to affected leg, but it has worsened over the past 2 days. He is a&ox3 visibly uncomfortable. Denies SOB/chest pain/N/V/Dizziness/abdominal pain/pain in right leg/pain in arms. States the pain is the entire left leg worse in the foot. PMH PVD, HTN, HLD. Left foot appears more red than right, sensation intact bilaterally, no change in temperature in both feet, motor function intact bilaterally. No palpable pulse noted- PAULINE Jacobo and Dr Sosa aware. Dr Sosa at bedside immediately with doppler. Will continue to monitor.
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
## 2020-03-14 NOTE — ED ADULT TRIAGE NOTE - CHIEF COMPLAINT QUOTE
pt scheduled for vascular surgery to [facility] leg 4/1 pt states pain worsening pt took oxycodone 10mg last taKen last night no pain meds taken today

## 2020-03-14 NOTE — H&P ADULT - ASSESSMENT
ASSESSMENT:  59M PMHx CAD s/p stent x2 to LAD with negative cath 2019 per chart review, HTN, HLD, COPD, PAD s/p R-->L fem-fem ('04) & left SFA stent ('19), former 1 ppd x30 year smoker now presenting to the ED c/o severe worsening RLE x2 days, with concern for acute on chronic arterial occlusion.    PLAN:  - Admit to vascular surgery under Dr. Maguire  - Stat CTA abdomen with b/l LE runnoff  - Heparin gtt  - NPO / IVF  - Will hold home ASA and continue plavix  - Medicine consult for clearance for arterial bypass    Discussed with vascular surgery fellow    Rusty Butcher, PGY-3  Vascular Surgery [phone] ASSESSMENT:  59M PMHx CAD s/p stent x2 to LAD with negative cath 2019 per chart review, HTN, HLD, COPD, PAD s/p R-->L fem-fem ('04) & left SFA stent ('19), former 1 ppd x30 year smoker now presenting to the ED c/o severe worsening LLE x2 days, with concern for acute on chronic arterial occlusion.    PLAN:  - Admit to vascular surgery under Dr. Maguire  - Stat CTA abdomen with b/l LE runnoff  - Heparin gtt  - NPO / IVF  - Will hold home ASA and continue plavix  - Medicine consult for clearance for arterial bypass    Discussed with vascular surgery fellow    Rusty Butcher, PGY-3  Vascular Surgery [phone]

## 2020-03-15 DIAGNOSIS — I10 ESSENTIAL (PRIMARY) HYPERTENSION: ICD-10-CM

## 2020-03-15 DIAGNOSIS — Z29.9 ENCOUNTER FOR PROPHYLACTIC MEASURES, UNSPECIFIED: ICD-10-CM

## 2020-03-15 DIAGNOSIS — E78.5 HYPERLIPIDEMIA, UNSPECIFIED: ICD-10-CM

## 2020-03-15 DIAGNOSIS — I25.10 ATHEROSCLEROTIC HEART DISEASE OF NATIVE CORONARY ARTERY WITHOUT ANGINA PECTORIS: ICD-10-CM

## 2020-03-15 DIAGNOSIS — M79.605 PAIN IN LEFT LEG: ICD-10-CM

## 2020-03-15 LAB
ANION GAP SERPL CALC-SCNC: 10 MMOL/L — SIGNIFICANT CHANGE UP (ref 5–17)
APTT BLD: 77.9 SEC — HIGH (ref 27.5–36.3)
APTT BLD: >200 SEC — CRITICAL HIGH (ref 27.5–36.3)
APTT BLD: >200 SEC — CRITICAL HIGH (ref 27.5–36.3)
BUN SERPL-MCNC: 10 MG/DL — SIGNIFICANT CHANGE UP (ref 7–23)
CALCIUM SERPL-MCNC: 9 MG/DL — SIGNIFICANT CHANGE UP (ref 8.4–10.5)
CHLORIDE SERPL-SCNC: 105 MMOL/L — SIGNIFICANT CHANGE UP (ref 96–108)
CO2 SERPL-SCNC: 23 MMOL/L — SIGNIFICANT CHANGE UP (ref 22–31)
CREAT SERPL-MCNC: 1.09 MG/DL — SIGNIFICANT CHANGE UP (ref 0.5–1.3)
GLUCOSE SERPL-MCNC: 93 MG/DL — SIGNIFICANT CHANGE UP (ref 70–99)
HCT VFR BLD CALC: 36 % — LOW (ref 39–50)
HCT VFR BLD CALC: 36.9 % — LOW (ref 39–50)
HCV AB S/CO SERPL IA: 0.16 S/CO — SIGNIFICANT CHANGE UP (ref 0–0.99)
HCV AB SERPL-IMP: SIGNIFICANT CHANGE UP
HGB BLD-MCNC: 11.6 G/DL — LOW (ref 13–17)
HGB BLD-MCNC: 11.7 G/DL — LOW (ref 13–17)
MAGNESIUM SERPL-MCNC: 1.3 MG/DL — LOW (ref 1.6–2.6)
MCHC RBC-ENTMCNC: 31.3 PG — SIGNIFICANT CHANGE UP (ref 27–34)
MCHC RBC-ENTMCNC: 31.4 GM/DL — LOW (ref 32–36)
MCHC RBC-ENTMCNC: 32.1 PG — SIGNIFICANT CHANGE UP (ref 27–34)
MCHC RBC-ENTMCNC: 32.5 GM/DL — SIGNIFICANT CHANGE UP (ref 32–36)
MCV RBC AUTO: 98.6 FL — SIGNIFICANT CHANGE UP (ref 80–100)
MCV RBC AUTO: 99.5 FL — SIGNIFICANT CHANGE UP (ref 80–100)
NRBC # BLD: 0 /100 WBCS — SIGNIFICANT CHANGE UP (ref 0–0)
NRBC # BLD: 0 /100 WBCS — SIGNIFICANT CHANGE UP (ref 0–0)
PHOSPHATE SERPL-MCNC: 2.6 MG/DL — SIGNIFICANT CHANGE UP (ref 2.5–4.5)
PLATELET # BLD AUTO: 219 K/UL — SIGNIFICANT CHANGE UP (ref 150–400)
PLATELET # BLD AUTO: 221 K/UL — SIGNIFICANT CHANGE UP (ref 150–400)
POTASSIUM SERPL-MCNC: 3.5 MMOL/L — SIGNIFICANT CHANGE UP (ref 3.5–5.3)
POTASSIUM SERPL-SCNC: 3.5 MMOL/L — SIGNIFICANT CHANGE UP (ref 3.5–5.3)
RBC # BLD: 3.65 M/UL — LOW (ref 4.2–5.8)
RBC # BLD: 3.71 M/UL — LOW (ref 4.2–5.8)
RBC # FLD: 12.6 % — SIGNIFICANT CHANGE UP (ref 10.3–14.5)
RBC # FLD: 12.7 % — SIGNIFICANT CHANGE UP (ref 10.3–14.5)
SODIUM SERPL-SCNC: 138 MMOL/L — SIGNIFICANT CHANGE UP (ref 135–145)
WBC # BLD: 6.92 K/UL — SIGNIFICANT CHANGE UP (ref 3.8–10.5)
WBC # BLD: 7.22 K/UL — SIGNIFICANT CHANGE UP (ref 3.8–10.5)
WBC # FLD AUTO: 6.92 K/UL — SIGNIFICANT CHANGE UP (ref 3.8–10.5)
WBC # FLD AUTO: 7.22 K/UL — SIGNIFICANT CHANGE UP (ref 3.8–10.5)

## 2020-03-15 PROCEDURE — 99223 1ST HOSP IP/OBS HIGH 75: CPT

## 2020-03-15 PROCEDURE — 93010 ELECTROCARDIOGRAM REPORT: CPT

## 2020-03-15 RX ORDER — OXYCODONE HYDROCHLORIDE 5 MG/1
5 TABLET ORAL EVERY 6 HOURS
Refills: 0 | Status: DISCONTINUED | OUTPATIENT
Start: 2020-03-15 | End: 2020-03-18

## 2020-03-15 RX ORDER — OXYCODONE HYDROCHLORIDE 5 MG/1
10 TABLET ORAL EVERY 6 HOURS
Refills: 0 | Status: DISCONTINUED | OUTPATIENT
Start: 2020-03-15 | End: 2020-03-18

## 2020-03-15 RX ORDER — MAGNESIUM SULFATE 500 MG/ML
2 VIAL (ML) INJECTION ONCE
Refills: 0 | Status: COMPLETED | OUTPATIENT
Start: 2020-03-15 | End: 2020-03-15

## 2020-03-15 RX ORDER — ACETAMINOPHEN 500 MG
975 TABLET ORAL EVERY 6 HOURS
Refills: 0 | Status: DISCONTINUED | OUTPATIENT
Start: 2020-03-15 | End: 2020-03-18

## 2020-03-15 RX ORDER — HEPARIN SODIUM 5000 [USP'U]/ML
1600 INJECTION INTRAVENOUS; SUBCUTANEOUS
Qty: 25000 | Refills: 0 | Status: DISCONTINUED | OUTPATIENT
Start: 2020-03-15 | End: 2020-03-18

## 2020-03-15 RX ORDER — HYDROMORPHONE HYDROCHLORIDE 2 MG/ML
1 INJECTION INTRAMUSCULAR; INTRAVENOUS; SUBCUTANEOUS EVERY 6 HOURS
Refills: 0 | Status: DISCONTINUED | OUTPATIENT
Start: 2020-03-15 | End: 2020-03-18

## 2020-03-15 RX ORDER — INFLUENZA VIRUS VACCINE 15; 15; 15; 15 UG/.5ML; UG/.5ML; UG/.5ML; UG/.5ML
0.5 SUSPENSION INTRAMUSCULAR ONCE
Refills: 0 | Status: DISCONTINUED | OUTPATIENT
Start: 2020-03-15 | End: 2020-03-20

## 2020-03-15 RX ADMIN — OXYCODONE HYDROCHLORIDE 10 MILLIGRAM(S): 5 TABLET ORAL at 10:32

## 2020-03-15 RX ADMIN — ATORVASTATIN CALCIUM 80 MILLIGRAM(S): 80 TABLET, FILM COATED ORAL at 21:19

## 2020-03-15 RX ADMIN — Medication 25 MILLIGRAM(S): at 05:34

## 2020-03-15 RX ADMIN — OXYCODONE HYDROCHLORIDE 10 MILLIGRAM(S): 5 TABLET ORAL at 10:02

## 2020-03-15 RX ADMIN — HEPARIN SODIUM 16 UNIT(S)/HR: 5000 INJECTION INTRAVENOUS; SUBCUTANEOUS at 02:28

## 2020-03-15 RX ADMIN — OXYCODONE HYDROCHLORIDE 10 MILLIGRAM(S): 5 TABLET ORAL at 16:42

## 2020-03-15 RX ADMIN — Medication 975 MILLIGRAM(S): at 10:59

## 2020-03-15 RX ADMIN — HEPARIN SODIUM 12 UNIT(S)/HR: 5000 INJECTION INTRAVENOUS; SUBCUTANEOUS at 11:23

## 2020-03-15 RX ADMIN — HYDROMORPHONE HYDROCHLORIDE 1 MILLIGRAM(S): 2 INJECTION INTRAMUSCULAR; INTRAVENOUS; SUBCUTANEOUS at 07:33

## 2020-03-15 RX ADMIN — HYDROMORPHONE HYDROCHLORIDE 1 MILLIGRAM(S): 2 INJECTION INTRAMUSCULAR; INTRAVENOUS; SUBCUTANEOUS at 00:51

## 2020-03-15 RX ADMIN — Medication 1000 MILLIGRAM(S): at 00:51

## 2020-03-15 RX ADMIN — OXYCODONE HYDROCHLORIDE 10 MILLIGRAM(S): 5 TABLET ORAL at 22:03

## 2020-03-15 RX ADMIN — CLOPIDOGREL BISULFATE 75 MILLIGRAM(S): 75 TABLET, FILM COATED ORAL at 10:59

## 2020-03-15 RX ADMIN — Medication 50 GRAM(S): at 08:35

## 2020-03-15 RX ADMIN — HYDROMORPHONE HYDROCHLORIDE 1 MILLIGRAM(S): 2 INJECTION INTRAMUSCULAR; INTRAVENOUS; SUBCUTANEOUS at 10:57

## 2020-03-15 RX ADMIN — HEPARIN SODIUM 12 UNIT(S)/HR: 5000 INJECTION INTRAVENOUS; SUBCUTANEOUS at 17:36

## 2020-03-15 RX ADMIN — Medication 400 MILLIGRAM(S): at 05:33

## 2020-03-15 RX ADMIN — HYDROMORPHONE HYDROCHLORIDE 1 MILLIGRAM(S): 2 INJECTION INTRAMUSCULAR; INTRAVENOUS; SUBCUTANEOUS at 05:34

## 2020-03-15 RX ADMIN — HYDROMORPHONE HYDROCHLORIDE 1 MILLIGRAM(S): 2 INJECTION INTRAMUSCULAR; INTRAVENOUS; SUBCUTANEOUS at 17:31

## 2020-03-15 RX ADMIN — OXYCODONE HYDROCHLORIDE 10 MILLIGRAM(S): 5 TABLET ORAL at 23:15

## 2020-03-15 RX ADMIN — Medication 975 MILLIGRAM(S): at 17:26

## 2020-03-15 RX ADMIN — AMLODIPINE BESYLATE 5 MILLIGRAM(S): 2.5 TABLET ORAL at 05:34

## 2020-03-15 RX ADMIN — Medication 25 MILLIGRAM(S): at 17:26

## 2020-03-15 RX ADMIN — OXYCODONE HYDROCHLORIDE 10 MILLIGRAM(S): 5 TABLET ORAL at 16:12

## 2020-03-15 RX ADMIN — Medication 1000 MILLIGRAM(S): at 06:45

## 2020-03-15 RX ADMIN — LISINOPRIL 40 MILLIGRAM(S): 2.5 TABLET ORAL at 05:34

## 2020-03-15 RX ADMIN — HYDROMORPHONE HYDROCHLORIDE 1 MILLIGRAM(S): 2 INJECTION INTRAMUSCULAR; INTRAVENOUS; SUBCUTANEOUS at 11:30

## 2020-03-15 NOTE — PROGRESS NOTE ADULT - SUBJECTIVE AND OBJECTIVE BOX
GENERAL SURGERY DAILY PROGRESS NOTE:    Interval:  No acute events overnight endorsed.    Subjective:  Patient seen and examined this am. Reports pain is well controlled. No other complaints. Denies fever. Denies HA/CP/SOB. Denies N/V/D. +Tolerating diet. +Voiding. +Passing flatus. +BM. +OOB.    Vital Signs Last 24 Hrs  T(C): 36.3 (15 Mar 2020 05:55), Max: 36.8 (14 Mar 2020 20:55)  T(F): 97.3 (15 Mar 2020 05:55), Max: 98.3 (14 Mar 2020 20:55)  HR: 60 (15 Mar 2020 05:55) (60 - 82)  BP: 143/86 (15 Mar 2020 05:55) (136/89 - 163/86)  BP(mean): --  RR: 18 (15 Mar 2020 05:55) (18 - 20)  SpO2: 95% (15 Mar 2020 05:55) (95% - 100%)    Physical Exam:   Gen: AAOx3, NAD, mentating normally  Neuro: Cranial nerves II-XII grossly intact  HEENT: Atraumatic, normocephalic  CV: RRR, normal S1/S2, no audible m/r/g  Pulm: Breathing comfortably on RA. Equal chest rise b/l. Lung fields CTAB  Abd: Soft, non-tender, non-distended. No rebound or guarding.  Extremities: LLE: Erythema at the foot, very tender to palpation. Reduced sensation on the left foot compared to the right. Motor intact. Left- No DP, PT, peroneal, or popliteal signals present. Femoral signal present. Right- DP/PT signals present  Back/flank: No CVA tenderness  WWP. Moving all 4 extremities spontaneously. Strength 5/5. Sensation intact  Skin: No rashes or suspicious lesions    I&O's Detail    14 Mar 2020 07:01  -  15 Mar 2020 06:25  --------------------------------------------------------  IN:  Total IN: 0 mL    OUT:    Voided: 350 mL  Total OUT: 350 mL    Total NET: -350 mL          Daily Height in cm: 190.5 (14 Mar 2020 15:45)    Daily     MEDICATIONS  (STANDING):  acetaminophen  IVPB .. 1000 milliGRAM(s) IV Intermittent every 6 hours  amLODIPine   Tablet 5 milliGRAM(s) Oral daily  atorvastatin 80 milliGRAM(s) Oral at bedtime  clopidogrel Tablet 75 milliGRAM(s) Oral daily  heparin  Infusion 1600 Unit(s)/Hr (16 mL/Hr) IV Continuous <Continuous>  influenza   Vaccine 0.5 milliLiter(s) IntraMuscular once  lactated ringers. 1000 milliLiter(s) (100 mL/Hr) IV Continuous <Continuous>  lisinopril 40 milliGRAM(s) Oral daily  metoprolol tartrate 25 milliGRAM(s) Oral two times a day    MEDICATIONS  (PRN):  HYDROmorphone  Injectable 0.5 milliGRAM(s) IV Push every 6 hours PRN Moderate Pain (4 - 6)  HYDROmorphone  Injectable 1 milliGRAM(s) IV Push every 6 hours PRN Severe Pain (7 - 10)      LABS:                        11.6   7.22  )-----------( 219      ( 15 Mar 2020 00:36 )             36.9     03-14    138  |  104  |  13  ----------------------------<  86  3.9   |  20<L>  |  1.27    Ca    9.5      14 Mar 2020 17:57    TPro  7.1  /  Alb  3.9  /  TBili  0.5  /  DBili  x   /  AST  28  /  ALT  25  /  AlkPhos  53  03-14    PT/INR - ( 14 Mar 2020 17:57 )   PT: 12.6 sec;   INR: 1.09 ratio         PTT - ( 15 Mar 2020 00:36 )  PTT:>200.0 sec      PEPE Soriano, PGY-1  Vascular Surgery  [id number] with any questions GENERAL SURGERY DAILY PROGRESS NOTE:    Interval:  No acute events overnight endorsed.    Subjective:  Patient seen and examined this am. Reports LLE pain. No other complaints. Counselled regarding surgery    Vital Signs Last 24 Hrs  T(C): 36.3 (15 Mar 2020 05:55), Max: 36.8 (14 Mar 2020 20:55)  T(F): 97.3 (15 Mar 2020 05:55), Max: 98.3 (14 Mar 2020 20:55)  HR: 60 (15 Mar 2020 05:55) (60 - 82)  BP: 143/86 (15 Mar 2020 05:55) (136/89 - 163/86)  RR: 18 (15 Mar 2020 05:55) (18 - 20)  SpO2: 95% (15 Mar 2020 05:55) (95% - 100%)    Physical Exam:   Gen: AAOx3, NAD, mentating normally  Neuro: Cranial nerves II-XII grossly intact  HEENT: Atraumatic, normocephalic  CV: RRR, normal S1/S2, no audible m/r/g  Pulm: Breathing comfortably on RA. Equal chest rise b/l. Lung fields CTAB  Abd: Soft, non-tender, non-distended. No rebound or guarding.  Extremities: LLE: Erythema at the foot, very tender to palpation. Reduced sensation on the left foot compared to the right. MI TA/G/S/FHL/EHL. No DP, PT, peroneal, or popliteal signals present. Femoral signal present. RLE: SILT S/S/SP/DP MI TA/G/S/FHL/EHL dopplerable PT faint DP  Back/flank: No CVA tenderness  Skin: No rashes or suspicious lesions    I&O's Detail    14 Mar 2020 07:01  -  15 Mar 2020 06:25  --------------------------------------------------------  IN:  Total IN: 0 mL    OUT:    Voided: 350 mL  Total OUT: 350 mL    Total NET: -350 mL          Daily Height in cm: 190.5 (14 Mar 2020 15:45)    Daily     MEDICATIONS  (STANDING):  acetaminophen  IVPB .. 1000 milliGRAM(s) IV Intermittent every 6 hours  amLODIPine   Tablet 5 milliGRAM(s) Oral daily  atorvastatin 80 milliGRAM(s) Oral at bedtime  clopidogrel Tablet 75 milliGRAM(s) Oral daily  heparin  Infusion 1600 Unit(s)/Hr (16 mL/Hr) IV Continuous <Continuous>  influenza   Vaccine 0.5 milliLiter(s) IntraMuscular once  lactated ringers. 1000 milliLiter(s) (100 mL/Hr) IV Continuous <Continuous>  lisinopril 40 milliGRAM(s) Oral daily  metoprolol tartrate 25 milliGRAM(s) Oral two times a day    MEDICATIONS  (PRN):  HYDROmorphone  Injectable 0.5 milliGRAM(s) IV Push every 6 hours PRN Moderate Pain (4 - 6)  HYDROmorphone  Injectable 1 milliGRAM(s) IV Push every 6 hours PRN Severe Pain (7 - 10)      LABS:             11.6   7.22  )-----------( 219      ( 15 Mar 2020 00:36 )             36.9     03-14    138  |  104  |  13  ----------------------------<  86  3.9   |  20<L>  |  1.27    Ca    9.5      14 Mar 2020 17:57    TPro  7.1  /  Alb  3.9  /  TBili  0.5  /  DBili  x   /  AST  28  /  ALT  25  /  AlkPhos  53  03-14    PT/INR - ( 14 Mar 2020 17:57 )   PT: 12.6 sec;   INR: 1.09 ratio         PTT - ( 15 Mar 2020 00:36 )  PTT:>200.0 sec      PEPE Soriano, PGY-1  Vascular Surgery  [phone] with any questions

## 2020-03-15 NOTE — PATIENT PROFILE ADULT - SAFE PLACE TO LIVE
Patient took her blood pressure at 9AM this morning and it was elevated.  It is 96/76    Please call with direction. She no longer has a visiting nurse to help her       no

## 2020-03-15 NOTE — CONSULT NOTE ADULT - ASSESSMENT
60 yo male with PMH of HTN, HLD, CAD s/p stents x 2 to LAD (1995, 2014), negative cardiac cath 2019, right fem-fem bypass 2004 and left SFA stent 2019, CANDELARIA, does not use CPAP, TIA Nov 2018, b/l carotid artery stenosis, s/p left carotid endarterectomy 02/2019, former smoker, initially presented to ED with severe worsening LLE pain x 2 days.

## 2020-03-15 NOTE — PROVIDER CONTACT NOTE (CRITICAL VALUE NOTIFICATION) - NS PROVIDER READ BACK TO LAB
Work hard on the diet and exercise    Read YOUNGER NEXT YEAR    Call if you want a session with the diabetic educator    Fasting labs before two month recheck    Work with the trazodone,  mg nightly for sleep      IT IS VERY IMPORTANT THAT YOU KEEP A PRINTED LIST OF ALL OF YOUR MEDICATIONS AND DOSES AND OF ALL MEDICATION ALLERGIES WITH YOU/ON YOUR PERSON AT ALL TIMES.  THIS IS OF CRITICAL IMPORTANCE IN THE EVENT THAT YOU ARE INJURED OR ILL AND ARE UNABLE TO CONVEY THIS INFORMATION TO THOSE CARING FOR YOU IN AN EMERGENCY SITUATION.      Go to your pharmacy for the Hepatitis A vaccine   yes

## 2020-03-15 NOTE — CONSULT NOTE ADULT - PROBLEM SELECTOR RECOMMENDATION 9
Patient with LLE pain (however, documented in chart as right lower extremity pain), with concern for acute on chronic arterial occlusion.  Discussed with Vascular PA , ? planned for bypass revision (will need to confirm in AM)  Pending cardiology consult for clearance  continue current management  Patient with no active cardiac conditions (no ACS, no recent MI, no evidence of valvular disease or decompensated HF, no arrhythmia-EKG ordered);   obtain previous echo report  Currently patient is at acceptable risk for high risk procedure, provided normal EKG and echo  would recommend holding ace inh prior to procedure  c/w other meds per primary team

## 2020-03-15 NOTE — CONSULT NOTE ADULT - SUBJECTIVE AND OBJECTIVE BOX
58 yo male with PMH of HTN, HLD, CAD s/p stents x 2 to LAD (1995, 2014), negative cardiac cath 2019, right fem-fem bypass 2004 and left SFA stent 2019, CANDELARIA, does not use CPAP, TIA Nov 2018, b/l carotid artery stenosis, s/p left carotid endarterectomy 02/2019, former smoker, initially presented to ED with severe worsening LLE pain x 2 days.   Patient states that he had pain in left leg for a long time, but over the past two days pain is worse.  He describes pain as burning associated with numbness and tingling.  Pain is present at rest but worsened with walking.  He believes he can walk long distance and climb stairs, but has not been able to do so.  He denies any chest pain, SOB, cough, fever, chills, nausea, vomiting.  Medicine consult called for medical clearance prior to procedure (unclear what procedure is planned; ?bypass revision)    ROS  CONSTITUTIONAL: No weakness, fevers or chills  EYES/ENT: No visual changes;  No dysphagia  NECK: No pain or stiffness  RESPIRATORY: No cough, wheezing, hemoptysis; No shortness of breath  CARDIOVASCULAR: No chest pain or palpitations; No lower extremity edema  EXTREMITIES: Left leg pain  MUSCULOSKELETAL: no joint pain, swelling  GASTROINTESTINAL: No abdominal or epigastric pain. No nausea, vomiting, or hematemesis; No diarrhea or constipation. No melena or hematochezia.  BACK: No back pain  GENITOURINARY: No dysuria, frequency or hematuria  NEUROLOGICAL: No numbness or weakness  SKIN: No itching, burning, rashes, or lesions   PSYCH: no agitation  All other review of systems is negative unless indicated above.    PMH: HTN, HLD, CAD s/p Stents x 2 to LAD (1995, 2014), CANDELARIA, TIA, carotid stenosis, GERD, PAD, diverticulitis  PSH: PCI, fem-fem bypass, left SFA stent, left carotid endarterectomy, colon surgery  Fam Hx: Mother with history heart disease  Social Hx: Former smoker, quit 15months ago, drinks alcohol occasionally, lives with wife  Allergies: Ativan, Niacin, Valium  MEDICATIONS  (STANDING):  acetaminophen   Tablet .. 975 milliGRAM(s) Oral every 6 hours  amLODIPine   Tablet 5 milliGRAM(s) Oral daily  atorvastatin 80 milliGRAM(s) Oral at bedtime  clopidogrel Tablet 75 milliGRAM(s) Oral daily  heparin  Infusion 1600 Unit(s)/Hr (12 mL/Hr) IV Continuous <Continuous>  influenza   Vaccine 0.5 milliLiter(s) IntraMuscular once  lisinopril 40 milliGRAM(s) Oral daily  metoprolol tartrate 25 milliGRAM(s) Oral two times a day    MEDICATIONS  (PRN):  HYDROmorphone  Injectable 1 milliGRAM(s) IV Push every 6 hours PRN Breakthrough pain  oxyCODONE    IR 5 milliGRAM(s) Oral every 6 hours PRN Moderate Pain (4 - 6)  oxyCODONE    IR 10 milliGRAM(s) Oral every 6 hours PRN Severe Pain (7 - 10)    PHYSICAL EXAM:  Vital Signs Last 24 Hrs  T(C): 37 (03-15-20 @ 21:21)  T(F): 98.6 (03-15-20 @ 21:21), Max: 98.6 (03-15-20 @ 21:21)  HR: 63 (03-15-20 @ 21:21) (53 - 82)  BP: 118/79 (03-15-20 @ 21:21)  BP(mean): --  RR: 18 (03-15-20 @ 21:21) (17 - 18)  SpO2: 96% (03-15-20 @ 21:21) (95% - 100%)  Wt(kg): --    03-14 @ 07:01  -  03-15 @ 07:00  --------------------------------------------------------  IN: 0 mL / OUT: 350 mL / NET: -350 mL    03-15 @ 07:01  -  03-15 @ 22:25  --------------------------------------------------------  IN: 848 mL / OUT: 900 mL / NET: -52 mL      Constitutional: NAD, awake and alert  EYES: EOMI  ENT:  Normal Hearing, no tonsillar exudates   Neck: Soft and supple, No JVD  Lungs: Breath sounds are clear bilaterally, No wheezing, rales or rhonchi  Heart: S1 and S2, regular rate and rhythm, no Murmurs, gallops or rubs  Abdomen: Bowel Sounds present, soft, nontender, nondistended, no guarding, no rebound  Extremities: No cyanosis or clubbing; warm to touch  Vascular: palpable pulse b/l LE; slightly cool to touch  Neurological: A/O x 3, no focal deficits  Musculoskeletal: 5/5 strength b/l upper and lower extremities  Skin: No rashes  Psych: no depression or anhedonia  HEME: no bruises, no nose bleeds    Labs personally reviewed:                          11.7   6.92  )-----------( 221      ( 15 Mar 2020 06:53 )             36.0     03-15    138  |  105  |  10  ----------------------------<  93  3.5   |  23  |  1.09    Ca    9.0      15 Mar 2020 06:53  Phos  2.6     03-15  Mg     1.3     03-15    TPro  7.1  /  Alb  3.9  /  TBili  0.5  /  DBili  x   /  AST  28  /  ALT  25  /  AlkPhos  53  03-14        LIVER FUNCTIONS - ( 14 Mar 2020 17:57 )  Alb: 3.9 g/dL / Pro: 7.1 g/dL / ALK PHOS: 53 U/L / ALT: 25 U/L / AST: 28 U/L / GGT: x           PT/INR - ( 14 Mar 2020 17:57 )   PT: 12.6 sec;   INR: 1.09 ratio         PTT - ( 15 Mar 2020 17:04 )  PTT:77.9 sec    CAPILLARY BLOOD GLUCOSE          Imaging reviewed:  CTA abd  IMPRESSION:     1.  Status post femoral-femoral bypass with chronic occlusion of the left common, external and internal iliac arteries.   2.  The femoral-femoral bypass is occluded.  3.  Occlusion of the left superficial femoral artery.  4.  Occlusion of the mid to distal segments of the right superficial femoral artery  5.  The popliteal artery is patent bilaterally with likely patent three-vessel runoffs.  6.  Findings previous discussed by Dr. Lundy to Dr. Lutz on 3/14/2020 at 9:52 PM.    EKG oredered

## 2020-03-15 NOTE — PROGRESS NOTE ADULT - ASSESSMENT
59M PMHx CAD s/p stent x2 to LAD with negative cath 2019 per chart review, HTN, HLD, COPD, PAD s/p R-->L fem-fem ('04) & left SFA stent ('19), former 1 ppd x30 year smoker now presenting to the ED c/o severe worsening RLE x2 days, with concern for acute on chronic arterial occlusion.    PLAN:  - Heparin gtt  - NPO / IVF  - Will hold home ASA and continue plavix  - Medicine consult for clearance for arterial bypass    Vascular Surgery   [id number] 59M PMHx CAD s/p stent x2 to LAD with negative cath 2019 per chart review, HTN, HLD, COPD, PAD s/p R-->L fem-fem ('04) & left SFA stent ('19), former 1 ppd x30 year smoker now presenting to the ED c/o severe worsening RLE x2 days, with concern for acute on chronic arterial occlusion.    PLAN:  - Heparin gtt  - Monitor PTT  - NPO / IVF  - Will hold home ASA and continue plavix  - Medicine consult for clearance for arterial bypass    Vascular Surgery   [id number] 59M PMHx CAD s/p stent x2 to LAD with negative cath 2019 per chart review, HTN, HLD, COPD, PAD s/p R-->L fem-fem ('04) & left SFA stent ('19), former 1 ppd x30 year smoker now presenting to the ED c/o severe worsening RLE x2 days, with concern for acute on chronic arterial occlusion.    PLAN:  - Heparin gtt  - Monitor PTT  - NPO / IVF  - Will hold home ASA and continue plavix  - Medicine consult for clearance for arterial bypass  Repleted Mg for hypomagnesemia     Vascular Surgery   [id number] 59M PMHx CAD s/p stent x2 to LAD with negative cath 2019 per chart review, HTN, HLD, COPD, PAD s/p R-->L fem-fem ('04) & left SFA stent ('19), former 1 ppd x30 year smoker now presenting to the ED c/o severe worsening RLE x2 days, with concern for acute on chronic arterial occlusion.    PLAN:  - Heparin gtt  - Monitor PTT  - NPO / IVF  - Will hold home ASA and continue plavix  - Medicine consult for clearance for arterial bypass  - Repleted Mg for hypomagnesemia   - Cards     Vascular Surgery   [id number]

## 2020-03-16 ENCOUNTER — TRANSCRIPTION ENCOUNTER (OUTPATIENT)
Age: 60
End: 2020-03-16

## 2020-03-16 LAB
ANION GAP SERPL CALC-SCNC: 14 MMOL/L — SIGNIFICANT CHANGE UP (ref 5–17)
APTT BLD: 65.5 SEC — HIGH (ref 27.5–36.3)
APTT BLD: 75.5 SEC — HIGH (ref 27.5–36.3)
BUN SERPL-MCNC: 10 MG/DL — SIGNIFICANT CHANGE UP (ref 7–23)
CALCIUM SERPL-MCNC: 9.7 MG/DL — SIGNIFICANT CHANGE UP (ref 8.4–10.5)
CHLORIDE SERPL-SCNC: 104 MMOL/L — SIGNIFICANT CHANGE UP (ref 96–108)
CO2 SERPL-SCNC: 20 MMOL/L — LOW (ref 22–31)
CREAT SERPL-MCNC: 1.18 MG/DL — SIGNIFICANT CHANGE UP (ref 0.5–1.3)
GLUCOSE SERPL-MCNC: 82 MG/DL — SIGNIFICANT CHANGE UP (ref 70–99)
HCT VFR BLD CALC: 38.9 % — LOW (ref 39–50)
HGB BLD-MCNC: 12 G/DL — LOW (ref 13–17)
MAGNESIUM SERPL-MCNC: 1.7 MG/DL — SIGNIFICANT CHANGE UP (ref 1.6–2.6)
MCHC RBC-ENTMCNC: 30.8 GM/DL — LOW (ref 32–36)
MCHC RBC-ENTMCNC: 31.3 PG — SIGNIFICANT CHANGE UP (ref 27–34)
MCV RBC AUTO: 101.6 FL — HIGH (ref 80–100)
NRBC # BLD: 0 /100 WBCS — SIGNIFICANT CHANGE UP (ref 0–0)
PHOSPHATE SERPL-MCNC: 3.1 MG/DL — SIGNIFICANT CHANGE UP (ref 2.5–4.5)
PLATELET # BLD AUTO: 226 K/UL — SIGNIFICANT CHANGE UP (ref 150–400)
POTASSIUM SERPL-MCNC: 3.8 MMOL/L — SIGNIFICANT CHANGE UP (ref 3.5–5.3)
POTASSIUM SERPL-SCNC: 3.8 MMOL/L — SIGNIFICANT CHANGE UP (ref 3.5–5.3)
RBC # BLD: 3.83 M/UL — LOW (ref 4.2–5.8)
RBC # FLD: 12.7 % — SIGNIFICANT CHANGE UP (ref 10.3–14.5)
SODIUM SERPL-SCNC: 138 MMOL/L — SIGNIFICANT CHANGE UP (ref 135–145)
WBC # BLD: 5.94 K/UL — SIGNIFICANT CHANGE UP (ref 3.8–10.5)
WBC # FLD AUTO: 5.94 K/UL — SIGNIFICANT CHANGE UP (ref 3.8–10.5)

## 2020-03-16 PROCEDURE — XXXXX: CPT

## 2020-03-16 PROCEDURE — 99233 SBSQ HOSP IP/OBS HIGH 50: CPT

## 2020-03-16 RX ORDER — POTASSIUM CHLORIDE 20 MEQ
20 PACKET (EA) ORAL ONCE
Refills: 0 | Status: COMPLETED | OUTPATIENT
Start: 2020-03-16 | End: 2020-03-16

## 2020-03-16 RX ORDER — MAGNESIUM SULFATE 500 MG/ML
2 VIAL (ML) INJECTION ONCE
Refills: 0 | Status: COMPLETED | OUTPATIENT
Start: 2020-03-16 | End: 2020-03-16

## 2020-03-16 RX ORDER — DULOXETINE HYDROCHLORIDE 30 MG/1
30 CAPSULE, DELAYED RELEASE ORAL DAILY
Refills: 0 | Status: DISCONTINUED | OUTPATIENT
Start: 2020-03-16 | End: 2020-03-18

## 2020-03-16 RX ORDER — MAGNESIUM SULFATE 500 MG/ML
2 VIAL (ML) INJECTION ONCE
Refills: 0 | Status: DISCONTINUED | OUTPATIENT
Start: 2020-03-16 | End: 2020-03-16

## 2020-03-16 RX ORDER — POTASSIUM CHLORIDE 20 MEQ
20 PACKET (EA) ORAL ONCE
Refills: 0 | Status: DISCONTINUED | OUTPATIENT
Start: 2020-03-16 | End: 2020-03-16

## 2020-03-16 RX ADMIN — Medication 20 MILLIEQUIVALENT(S): at 08:21

## 2020-03-16 RX ADMIN — Medication 975 MILLIGRAM(S): at 16:51

## 2020-03-16 RX ADMIN — OXYCODONE HYDROCHLORIDE 10 MILLIGRAM(S): 5 TABLET ORAL at 21:54

## 2020-03-16 RX ADMIN — HEPARIN SODIUM 12 UNIT(S)/HR: 5000 INJECTION INTRAVENOUS; SUBCUTANEOUS at 02:00

## 2020-03-16 RX ADMIN — AMLODIPINE BESYLATE 5 MILLIGRAM(S): 2.5 TABLET ORAL at 05:14

## 2020-03-16 RX ADMIN — Medication 25 MILLIGRAM(S): at 05:14

## 2020-03-16 RX ADMIN — HYDROMORPHONE HYDROCHLORIDE 1 MILLIGRAM(S): 2 INJECTION INTRAMUSCULAR; INTRAVENOUS; SUBCUTANEOUS at 18:57

## 2020-03-16 RX ADMIN — HYDROMORPHONE HYDROCHLORIDE 1 MILLIGRAM(S): 2 INJECTION INTRAMUSCULAR; INTRAVENOUS; SUBCUTANEOUS at 01:00

## 2020-03-16 RX ADMIN — HYDROMORPHONE HYDROCHLORIDE 1 MILLIGRAM(S): 2 INJECTION INTRAMUSCULAR; INTRAVENOUS; SUBCUTANEOUS at 09:00

## 2020-03-16 RX ADMIN — Medication 50 GRAM(S): at 08:21

## 2020-03-16 RX ADMIN — OXYCODONE HYDROCHLORIDE 10 MILLIGRAM(S): 5 TABLET ORAL at 05:17

## 2020-03-16 RX ADMIN — Medication 975 MILLIGRAM(S): at 06:00

## 2020-03-16 RX ADMIN — Medication 975 MILLIGRAM(S): at 01:15

## 2020-03-16 RX ADMIN — HYDROMORPHONE HYDROCHLORIDE 1 MILLIGRAM(S): 2 INJECTION INTRAMUSCULAR; INTRAVENOUS; SUBCUTANEOUS at 08:21

## 2020-03-16 RX ADMIN — HYDROMORPHONE HYDROCHLORIDE 1 MILLIGRAM(S): 2 INJECTION INTRAMUSCULAR; INTRAVENOUS; SUBCUTANEOUS at 19:39

## 2020-03-16 RX ADMIN — HEPARIN SODIUM 12 UNIT(S)/HR: 5000 INJECTION INTRAVENOUS; SUBCUTANEOUS at 07:37

## 2020-03-16 RX ADMIN — OXYCODONE HYDROCHLORIDE 10 MILLIGRAM(S): 5 TABLET ORAL at 16:30

## 2020-03-16 RX ADMIN — LISINOPRIL 40 MILLIGRAM(S): 2.5 TABLET ORAL at 05:14

## 2020-03-16 RX ADMIN — CLOPIDOGREL BISULFATE 75 MILLIGRAM(S): 75 TABLET, FILM COATED ORAL at 11:15

## 2020-03-16 RX ADMIN — HYDROMORPHONE HYDROCHLORIDE 1 MILLIGRAM(S): 2 INJECTION INTRAMUSCULAR; INTRAVENOUS; SUBCUTANEOUS at 00:27

## 2020-03-16 RX ADMIN — OXYCODONE HYDROCHLORIDE 10 MILLIGRAM(S): 5 TABLET ORAL at 15:38

## 2020-03-16 RX ADMIN — OXYCODONE HYDROCHLORIDE 10 MILLIGRAM(S): 5 TABLET ORAL at 23:00

## 2020-03-16 RX ADMIN — Medication 975 MILLIGRAM(S): at 05:14

## 2020-03-16 RX ADMIN — Medication 975 MILLIGRAM(S): at 11:15

## 2020-03-16 RX ADMIN — DULOXETINE HYDROCHLORIDE 30 MILLIGRAM(S): 30 CAPSULE, DELAYED RELEASE ORAL at 18:34

## 2020-03-16 RX ADMIN — Medication 975 MILLIGRAM(S): at 00:31

## 2020-03-16 RX ADMIN — OXYCODONE HYDROCHLORIDE 10 MILLIGRAM(S): 5 TABLET ORAL at 06:00

## 2020-03-16 RX ADMIN — ATORVASTATIN CALCIUM 80 MILLIGRAM(S): 80 TABLET, FILM COATED ORAL at 21:49

## 2020-03-16 RX ADMIN — Medication 25 MILLIGRAM(S): at 16:51

## 2020-03-16 NOTE — DISCHARGE NOTE PROVIDER - NSDCFUADDINST_GEN_ALL_CORE_FT
WOUND CARE:  Please keep incisions clean and dry. Please do not Scrub or rub incisions. Do not use lotion or powder on incisions.   BATHING: You may shower and/or sponge bathe. You may use warm soapy water in the shower and rinse, pat dry.  ACTIVITY: No heavy lifting or straining. Otherwise, you may return to your usual level of physical activity. If you are taking narcotic pain medication DO NOT drive a car, operate machinery or make important decisions.  DIET: Return to your usual diet.  NOTIFY YOUR SURGEON IF YOU HAVE: any bleeding that does not stop, any pus draining from your wound(s), any fever (over 100.4 F) persistent nausea/vomiting, or if your pain is not controlled on your discharge pain medications, unable to urinate.  Please follow up with your primary care physician in one week regarding your hospitalization, bring copies of your discharge paperwork.  Please follow up with your surgeon, Dr. Maugire Please return to hospital/ER if worsening pain, paresthesias, paralysis, and cold limb.

## 2020-03-16 NOTE — DISCHARGE NOTE PROVIDER - NSDCCPTREATMENT_GEN_ALL_CORE_FT
PRINCIPAL PROCEDURE  Procedure: Bypass graft, femoral-femoral, with non-vein  Findings and Treatment:       SECONDARY PROCEDURE  Procedure: Femoral-popliteal bypass graft with non-vein  Findings and Treatment:
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
## 2020-03-16 NOTE — PROVIDER CONTACT NOTE (OTHER) - SITUATION
Patient just had discussion with resident regarding surgery plan Rn called into room as patient's wife says patient is shaking. Patient just had discussion with resident regarding surgery plan

## 2020-03-16 NOTE — DISCHARGE NOTE PROVIDER - PROVIDER TOKENS
PROVIDER:[TOKEN:[[id number]],FOLLOWUP:[2 weeks]] PROVIDER:[TOKEN:[[id number]],FOLLOWUP:[1 week]]

## 2020-03-16 NOTE — PROGRESS NOTE ADULT - SUBJECTIVE AND OBJECTIVE BOX
GENERAL SURGERY DAILY PROGRESS NOTE:    Interval:  No acute events overnight endorsed.    Subjective:  Patient seen and examined this am. Reports LLE pain. No other complaints.     Vital Signs Last 24 Hrs  T(C): 36.4 (16 Mar 2020 05:08), Max: 37 (15 Mar 2020 21:21)  T(F): 97.5 (16 Mar 2020 05:08), Max: 98.6 (15 Mar 2020 21:21)  HR: 66 (16 Mar 2020 05:08) (50 - 67)  BP: 153/91 (16 Mar 2020 05:08) (118/79 - 153/91)  RR: 17 (16 Mar 2020 05:08) (17 - 18)  SpO2: 99% (16 Mar 2020 05:08) (95% - 99%)    Physical Exam:   Gen: AAOx3, NAD, mentating normally  Neuro: Cranial nerves II-XII grossly intact  HEENT: Atraumatic, normocephalic  CV: RRR, normal S1/S2, no audible m/r/g  Pulm: Breathing comfortably on RA. Equal chest rise b/l. Lung fields CTAB  Abd: Soft, non-tender, non-distended. No rebound or guarding.  Extremities: LLE: Erythema at the foot, very tender to palpation. Reduced sensation on the left foot compared to the right. MI TA/G/S/FHL/EHL. No DP, PT, peroneal, or popliteal signals present. Femoral signal present. RLE: SILT S/S/SP/DP MI TA/G/S/FHL/EHL dopplerable PT DP  Back/flank: No CVA tenderness  Skin: No rashes or suspicious lesions    LABS:             11.7   6.92  )-----------( 221      ( 15 Mar 2020 06:53 )             36.0     03-15    138  |  105  |  10  ----------------------------<  93  3.5   |  23  |  1.09    Ca    9.0      15 Mar 2020 06:53  Phos  2.6     03-15  Mg     1.3     03-15    TPro  7.1  /  Alb  3.9  /  TBili  0.5  /  DBili  x   /  AST  28  /  ALT  25  /  AlkPhos  53  03-14

## 2020-03-16 NOTE — DISCHARGE NOTE PROVIDER - CARE PROVIDER_API CALL
Michael Maguire)  Vascular Surgery  [facility], [address] [place] [address]  Phone: [phone]  Fax: [phone]  Follow Up Time: 2 weeks Michael Maguire)  Vascular Surgery  [facility], [address] [place] [address]  Phone: [phone]  Fax: [phone]  Follow Up Time: 1 week

## 2020-03-16 NOTE — DISCHARGE NOTE PROVIDER - NSDCFUSCHEDAPPT_GEN_ALL_CORE_FT
YESI CHARLES ; [facility]  YESI CHARLES ; 04/01/2020 ; [facility]  YESI CHARLES ; 04/01/2020 ; [facility] YES[facility] ; [facility]  YESI CHARLES ; 04/01/2020 ; [facility] Pre[facility]mits YESI CHARLES ; [facility]20/2020 ; [facility]  YESI CHARLES ; 04/01/2020 ; [facility] PreAdmits MELVIN, YESI SILVERIO ; 04/01/2020 ; [facility] PreAdmits

## 2020-03-16 NOTE — PROGRESS NOTE ADULT - ASSESSMENT
59M PMHx CAD s/p stent x2 to LAD with negative cath 2019 per chart review, HTN, HLD, COPD, PAD s/p R-->L fem-fem ('04) & left SFA stent ('19), former 1 ppd x30 year smoker now presenting to the ED c/o severe worsening RLE x2 days, with concern for acute on chronic arterial occlusion.    PLAN:  - Heparin gtt  - Monitor PTT  - Regular diet  - Will hold home ASA and continue plavix  - Medicine cleared  - EKG    Vascular Surgery   [id number] 59M PMHx CAD s/p stent x2 to LAD with negative cath 2019 per chart review, HTN, HLD, COPD, PAD s/p R-->L fem-fem ('04) & left SFA stent ('19), former 1 ppd x30 year smoker now presenting to the ED c/o severe worsening LLE x2 days, with concern for acute on chronic arterial occlusion.    PLAN:  - Heparin gtt  - Monitor PTT  - Regular diet  - Will hold home ASA and continue plavix  - Medicine cleared  - EKG    Vascular Surgery   [id number]

## 2020-03-16 NOTE — PROVIDER CONTACT NOTE (OTHER) - ASSESSMENT
Patient a&ox4. Patient neurologically intact. Patient bp is 184/122, HR 55, temp is 97.5, patient is 100% on room. Patient reports he takes xanax at home. Patient reports feeling angry. Patient a&ox4. Patient neurologically intact. Patient bp is 184/122, HR 55, temp is 97.5, patient is 100% on room. Patient reports he takes xanax at home. Patient reports feeling angry. Patient denies chest pain/headache.

## 2020-03-16 NOTE — DISCHARGE NOTE PROVIDER - NSDCCPCAREPLAN_GEN_ALL_CORE_FT
PRINCIPAL DISCHARGE DIAGNOSIS  Diagnosis: Lower extremity pain, left  Assessment and Plan of Treatment: PRINCIPAL DISCHARGE DIAGNOSIS  Diagnosis: Lower extremity pain, left  Assessment and Plan of Treatment: Surgery planned however patient signed out AMA PRINCIPAL DISCHARGE DIAGNOSIS  Diagnosis: Lower extremity pain, left  Assessment and Plan of Treatment: Recovering from surgery  WOUND CARE:   BATHING: Please do not submerge wound underwater. You may shower and/or sponge bathe.  ACTIVITY: No heavy lifting or straining. Otherwise, you may return to your usual level of physical activity. If you are taking narcotic pain medication (such as Percocet), do NOT drive a car, operate machinery or make important decisions.  DIET: Return to your usual diet.  NOTIFY YOUR SURGEON IF: You have any bleeding that does not stop, any pus draining from your wound, any fever (over 100.4 F) or chills, persistent nausea/vomiting, persistent diarrhea, or if your pain is not controlled on your discharge pain medications.  FOLLOW-UP:  1. Please call to make a follow-up appointment within one week of discharge   2. Please follow up with your primary care physician in one week regarding your hospitalization.         SECONDARY DISCHARGE DIAGNOSES  Diagnosis: Peripheral arterial disease  Assessment and Plan of Treatment: Please start taking Xarelto 2.5 mg two times a day for peripheral arterial disease. Please follow up with Dr. Maguire within 1-2 weeks of discharge from the hospital.    Diagnosis: CAD (coronary artery disease)  Assessment and Plan of Treatment:     Diagnosis: HTN (hypertension)  Assessment and Plan of Treatment: Continue to take Lotrel and metopolol as prescribed.    Diagnosis: HLD (hyperlipidemia)  Assessment and Plan of Treatment: Continue to take Atorvasatin and Trilipex as prescribed. PRINCIPAL DISCHARGE DIAGNOSIS  Diagnosis: Lower extremity pain, left  Assessment and Plan of Treatment: Recovering from surgery  WOUND CARE:   BATHING: Please do not submerge wound underwater. You may shower and/or sponge bathe.  ACTIVITY: No heavy lifting or straining. Otherwise, you may return to your usual level of physical activity. If you are taking narcotic pain medication (such as Percocet), do NOT drive a car, operate machinery or make important decisions.  DIET: Return to your usual diet.  NOTIFY YOUR SURGEON IF: You have any bleeding that does not stop, any pus draining from your wound, any fever (over 100.4 F) or chills, persistent nausea/vomiting, persistent diarrhea, or if your pain is not controlled on your discharge pain medications.  FOLLOW-UP:  1. Please call to make a follow-up appointment within one week of discharge   2. Please follow up with your primary care physician in one week regarding your hospitalization.         SECONDARY DISCHARGE DIAGNOSES  Diagnosis: Cellulitis  Assessment and Plan of Treatment: Please continue keflex for 6 days for celullitis.    Diagnosis: Peripheral arterial disease  Assessment and Plan of Treatment: Please start taking Xarelto 2.5 mg two times a day for peripheral arterial disease. Please follow up with Dr. Maguire within 1-2 weeks of discharge from the hospital.    Diagnosis: CAD (coronary artery disease)  Assessment and Plan of Treatment:     Diagnosis: HTN (hypertension)  Assessment and Plan of Treatment: Continue to take Lotrel and metopolol as prescribed.    Diagnosis: HLD (hyperlipidemia)  Assessment and Plan of Treatment: Continue to take Atorvasatin and Trilipex as prescribed.

## 2020-03-16 NOTE — DISCHARGE NOTE PROVIDER - HOSPITAL COURSE
59M PMHx CAD s/p stent x2 to LAD with negative cath 2019 per chart review, HTN, HLD, COPD, PAD s/p R-->L fem-fem ('04) & left SFA stent ('19), former 1 ppd x30 year smoker now presenting to the ED c/o severe worsening RLE x2 days. Patient reports hes been having rest pain for over a month in his left foot, but that in the last two days the pain has increased significantly. Its now a 10/10, constant, worst in the foot. He's also experienced some waxing/waning erythema over the foot and lower leg. Patient denied CP, SOB, abdominal pain, N/V/D, cool extremities, fever, numbness.        ASA was held, clopidogrel, heparin ggt was started, and medicine was consulted for medical clearance. CTA showed occlusion of fem-fem bypass and left SFA with occlusion of the mid to distal seg of Right EFA. Pop artery is patent b/l with patent three-vessel runoffs. 59M PMHx CAD s/p stent x2 to LAD with negative cath 2019 per chart review, HTN, HLD, COPD, PAD s/p R-->L fem-fem ('04) & left SFA stent ('19), former 1 ppd x30 year smoker now presenting to the ED c/o severe worsening RLE x2 days. Patient reports hes been having rest pain for over a month in his left foot, but that in the last two days the pain has increased significantly. Its now a 10/10, constant, worst in the foot. He's also experienced some waxing/waning erythema over the foot and lower leg. Patient denied CP, SOB, abdominal pain, N/V/D, cool extremities, fever, numbness.        ASA was held, clopidogrel, heparin ggt was started, and medicine was consulted for medical clearance. CTA showed occlusion of fem-fem bypass and left SFA with occlusion of the mid to distal seg of Right EFA. Pop artery is patent b/l with patent three-vessel runoffs.         Patient states frustration with OR schedule and request to sign out AMA. MD at bedside spoke about possible OR time the next day but patient continue to be adamant about leaving and keeping his original scheduled operation with Dr. Correia in April. The risk of leaving the hospital were discussed with the patient and patient verbalizes understanding and accepted the risks. 59M PMHx CAD s/p stent x2 to LAD with negative cath 2019 per chart review, HTN, HLD, COPD, PAD s/p R-->L fem-fem ('04) & left SFA stent ('19), former 1 ppd x30 year smoker now presenting to the ED c/o severe worsening RLE x2 days. Patient reports hes been having rest pain for over a month in his left foot, but that in the last two days the pain has increased significantly. Its now a 10/10, constant, worst in the foot. He's also experienced some waxing/waning erythema over the foot and lower leg. Patient denied CP, SOB, abdominal pain, N/V/D, cool extremities, fever, numbness.        ASA was held, clopidogrel, heparin ggt was started, and medicine was consulted for medical clearance. CTA showed occlusion of fem-fem bypass and left SFA with occlusion of the mid to distal seg of Right EFA. Pop artery is patent b/l with patent three-vessel runoffs.         Patient states frustration with OR schedule and request to sign out AMA. MD at bedside spoke about possible OR time the next day but patient continue to be adamant about leaving and keeping his original scheduled operation with Dr. Correia in April. The risk of leaving the hospital were discussed with the patient including tissue loss and patient verbalizes understanding and accepted the risks. 59M PMHx CAD s/p stent x2 to LAD with negative cath 2019 per chart review, HTN, HLD, COPD, PAD s/p R-->L fem-fem ('04) & left SFA stent ('19), former 1 ppd x30 year smoker now presenting to the ED c/o severe worsening RLE x2 days. Patient reports hes been having rest pain for over a month in his left foot, but that in the last two days the pain has increased significantly. Its now a 10/10, constant, worst in the foot. He's also experienced some waxing/waning erythema over the foot and lower leg. Patient denied CP, SOB, abdominal pain, N/V/D, cool extremities, fever, numbness.        ASA and clopidogrel were held, heparin ggt was started, and medicine was consulted for medical clearance. CTA showed occlusion of fem-fem bypass and left SFA with occlusion of the mid to distal seg of Right EFA. Pop artery is patent b/l with patent three-vessel runoffs.         Cardiology consulted for pre-operative clearance recommending no further cardiovascular testing necessary prior to vascular intervention.         Patient taken to the OR on 3/18/20 for a right to left femoral-femoral bypass with PTFE and a left femoral to above knee popliteal bypass with PTFE. Post operatively the patient was doing well and was trasnferred from the PACU to the surgical floor. The finch catheter was removed and the patient was voiding freely. Patient was started on Xarelto for peripheral arterial disease. The patient was evaluated by PT recommending....... 59M PMHx CAD s/p stent x2 to LAD with negative cath 2019 per chart review, HTN, HLD, COPD, PAD s/p R-->L fem-fem ('04) & left SFA stent ('19), former 1 ppd x30 year smoker now presenting to the ED c/o severe worsening RLE x2 days. Patient reports hes been having rest pain for over a month in his left foot, but that in the last two days the pain has increased significantly. Its now a 10/10, constant, worst in the foot. He's also experienced some waxing/waning erythema over the foot and lower leg. Patient denied CP, SOB, abdominal pain, N/V/D, cool extremities, fever, numbness.        ASA and clopidogrel were held, heparin ggt was started, and medicine was consulted for medical clearance. CTA showed occlusion of fem-fem bypass and left SFA with occlusion of the mid to distal seg of Right EFA. Pop artery is patent b/l with patent three-vessel runoffs.         Cardiology consulted for pre-operative clearance recommending no further cardiovascular testing necessary prior to vascular intervention.         Patient taken to the OR on 3/18/20 for a right to left femoral-femoral bypass with PTFE and a left femoral to above knee popliteal bypass with PTFE. Post operatively the patient was doing well and was transferred from the PACU to the surgical floor. The finch catheter was removed and the patient was voiding freely. Patient tolerating regular diet, pain is well controlled on PO medication. Patient was started on Xarelto for peripheral arterial disease. The patient was evaluated by PT recommending home with no PT needs. Cane given by PT. Patient eager and amenable to discharge.

## 2020-03-16 NOTE — PROVIDER CONTACT NOTE (OTHER) - ACTION/TREATMENT ORDERED:
MD Gonzalez aware. No orders at this time. Calming techniques utilized. Team to order patient home dose of Cymbalta. repeat pressure in 30 minutes

## 2020-03-16 NOTE — DISCHARGE NOTE PROVIDER - NSDCACTIVITY_GEN_ALL_CORE
Do not drive or operate machinery while taking pain medications./Showering allowed/Do not drive or operate machinery/Walking - Outdoors allowed/Walking - Indoors allowed/No heavy lifting/straining

## 2020-03-16 NOTE — DISCHARGE NOTE PROVIDER - NSDCMRMEDTOKEN_GEN_ALL_CORE_FT
aspirin 81 mg oral delayed release tablet: 1 tab(s) orally once a day  atorvastatin 80 mg oral tablet: 1 tab(s) orally once a day (at bedtime)  Fiber Tabs 625 mg oral tablet: 1 tab(s) orally once a day  Lotrel 5 mg-40 mg oral capsule: 1 cap(s) orally once a day  metoprolol tartrate 25 mg oral tablet: 1 tab(s) orally 2 times a day  Plavix 75 mg oral tablet: 1 tab(s) orally once a day  Trilipix 135 mg oral delayed release capsule: 1 cap(s) orally once a day atorvastatin 80 mg oral tablet: 1 tab(s) orally once a day (at bedtime)  cephalexin 500 mg oral capsule: 1 cap(s) orally every 12 hours MDD:2  DULoxetine 30 mg oral delayed release capsule: 1 cap(s) orally once a day  Fiber Tabs 625 mg oral tablet: 1 tab(s) orally once a day  Lotrel 5 mg-40 mg oral capsule: 1 cap(s) orally once a day  metoprolol tartrate 25 mg oral tablet: 1 tab(s) orally 2 times a day  Multiple Vitamins oral tablet: 1 tab(s) orally once a day  Percocet 5 mg-325 mg oral tablet: 1 tab(s) orally every 6 hours, As Needed -for severe pain MDD:4 tabs   Plavix 75 mg oral tablet: 1 tab(s) orally once a day  rivaroxaban 2.5 mg oral tablet: 1 tab(s) orally once a day MDD:1 tab  Trilipix 135 mg oral delayed release capsule: 1 cap(s) orally once a day

## 2020-03-16 NOTE — DISCHARGE NOTE PROVIDER - NSDCDCMDCOMP_GEN_ALL_CORE
Pt awake apologizing for behavior earlier, states was just getting frustrated and thinks he needs to be back on his medication he was on a couple of years ago, pt vital signs taken and given tv dinner and juice, also placed in hospital bed for comfort   This document is complete and the patient is ready for discharge.

## 2020-03-17 ENCOUNTER — TRANSCRIPTION ENCOUNTER (OUTPATIENT)
Age: 60
End: 2020-03-17

## 2020-03-17 LAB
ANION GAP SERPL CALC-SCNC: 14 MMOL/L — SIGNIFICANT CHANGE UP (ref 5–17)
APTT BLD: 83.9 SEC — HIGH (ref 27.5–36.3)
BUN SERPL-MCNC: 10 MG/DL — SIGNIFICANT CHANGE UP (ref 7–23)
CALCIUM SERPL-MCNC: 9.6 MG/DL — SIGNIFICANT CHANGE UP (ref 8.4–10.5)
CHLORIDE SERPL-SCNC: 104 MMOL/L — SIGNIFICANT CHANGE UP (ref 96–108)
CO2 SERPL-SCNC: 23 MMOL/L — SIGNIFICANT CHANGE UP (ref 22–31)
CREAT SERPL-MCNC: 1.13 MG/DL — SIGNIFICANT CHANGE UP (ref 0.5–1.3)
GLUCOSE SERPL-MCNC: 96 MG/DL — SIGNIFICANT CHANGE UP (ref 70–99)
HCT VFR BLD CALC: 39.1 % — SIGNIFICANT CHANGE UP (ref 39–50)
HGB BLD-MCNC: 12.8 G/DL — LOW (ref 13–17)
MAGNESIUM SERPL-MCNC: 1.7 MG/DL — SIGNIFICANT CHANGE UP (ref 1.6–2.6)
MCHC RBC-ENTMCNC: 32.2 PG — SIGNIFICANT CHANGE UP (ref 27–34)
MCHC RBC-ENTMCNC: 32.7 GM/DL — SIGNIFICANT CHANGE UP (ref 32–36)
MCV RBC AUTO: 98.2 FL — SIGNIFICANT CHANGE UP (ref 80–100)
NRBC # BLD: 0 /100 WBCS — SIGNIFICANT CHANGE UP (ref 0–0)
PHOSPHATE SERPL-MCNC: 3.1 MG/DL — SIGNIFICANT CHANGE UP (ref 2.5–4.5)
PLATELET # BLD AUTO: 240 K/UL — SIGNIFICANT CHANGE UP (ref 150–400)
POTASSIUM SERPL-MCNC: 4 MMOL/L — SIGNIFICANT CHANGE UP (ref 3.5–5.3)
POTASSIUM SERPL-SCNC: 4 MMOL/L — SIGNIFICANT CHANGE UP (ref 3.5–5.3)
RBC # BLD: 3.98 M/UL — LOW (ref 4.2–5.8)
RBC # FLD: 12.5 % — SIGNIFICANT CHANGE UP (ref 10.3–14.5)
SODIUM SERPL-SCNC: 141 MMOL/L — SIGNIFICANT CHANGE UP (ref 135–145)
WBC # BLD: 6.71 K/UL — SIGNIFICANT CHANGE UP (ref 3.8–10.5)
WBC # FLD AUTO: 6.71 K/UL — SIGNIFICANT CHANGE UP (ref 3.8–10.5)

## 2020-03-17 PROCEDURE — 99232 SBSQ HOSP IP/OBS MODERATE 35: CPT | Mod: 57

## 2020-03-17 PROCEDURE — 99233 SBSQ HOSP IP/OBS HIGH 50: CPT

## 2020-03-17 RX ORDER — MAGNESIUM SULFATE 500 MG/ML
2 VIAL (ML) INJECTION ONCE
Refills: 0 | Status: COMPLETED | OUTPATIENT
Start: 2020-03-17 | End: 2020-03-17

## 2020-03-17 RX ORDER — ALPRAZOLAM 0.25 MG
0.5 TABLET ORAL ONCE
Refills: 0 | Status: DISCONTINUED | OUTPATIENT
Start: 2020-03-17 | End: 2020-03-17

## 2020-03-17 RX ADMIN — Medication 975 MILLIGRAM(S): at 12:35

## 2020-03-17 RX ADMIN — Medication 50 GRAM(S): at 08:12

## 2020-03-17 RX ADMIN — HEPARIN SODIUM 12 UNIT(S)/HR: 5000 INJECTION INTRAVENOUS; SUBCUTANEOUS at 06:08

## 2020-03-17 RX ADMIN — HYDROMORPHONE HYDROCHLORIDE 1 MILLIGRAM(S): 2 INJECTION INTRAMUSCULAR; INTRAVENOUS; SUBCUTANEOUS at 01:20

## 2020-03-17 RX ADMIN — Medication 1 TABLET(S): at 12:35

## 2020-03-17 RX ADMIN — HYDROMORPHONE HYDROCHLORIDE 1 MILLIGRAM(S): 2 INJECTION INTRAMUSCULAR; INTRAVENOUS; SUBCUTANEOUS at 21:26

## 2020-03-17 RX ADMIN — DULOXETINE HYDROCHLORIDE 30 MILLIGRAM(S): 30 CAPSULE, DELAYED RELEASE ORAL at 12:35

## 2020-03-17 RX ADMIN — CLOPIDOGREL BISULFATE 75 MILLIGRAM(S): 75 TABLET, FILM COATED ORAL at 14:54

## 2020-03-17 RX ADMIN — Medication 975 MILLIGRAM(S): at 00:46

## 2020-03-17 RX ADMIN — OXYCODONE HYDROCHLORIDE 10 MILLIGRAM(S): 5 TABLET ORAL at 12:36

## 2020-03-17 RX ADMIN — Medication 975 MILLIGRAM(S): at 06:08

## 2020-03-17 RX ADMIN — OXYCODONE HYDROCHLORIDE 10 MILLIGRAM(S): 5 TABLET ORAL at 13:30

## 2020-03-17 RX ADMIN — Medication 25 MILLIGRAM(S): at 17:33

## 2020-03-17 RX ADMIN — Medication 0.5 MILLIGRAM(S): at 14:54

## 2020-03-17 RX ADMIN — Medication 975 MILLIGRAM(S): at 19:00

## 2020-03-17 RX ADMIN — Medication 975 MILLIGRAM(S): at 01:20

## 2020-03-17 RX ADMIN — ATORVASTATIN CALCIUM 80 MILLIGRAM(S): 80 TABLET, FILM COATED ORAL at 21:27

## 2020-03-17 RX ADMIN — HYDROMORPHONE HYDROCHLORIDE 1 MILLIGRAM(S): 2 INJECTION INTRAMUSCULAR; INTRAVENOUS; SUBCUTANEOUS at 00:47

## 2020-03-17 RX ADMIN — OXYCODONE HYDROCHLORIDE 10 MILLIGRAM(S): 5 TABLET ORAL at 06:09

## 2020-03-17 RX ADMIN — Medication 975 MILLIGRAM(S): at 05:29

## 2020-03-17 RX ADMIN — OXYCODONE HYDROCHLORIDE 10 MILLIGRAM(S): 5 TABLET ORAL at 05:29

## 2020-03-17 RX ADMIN — Medication 975 MILLIGRAM(S): at 18:06

## 2020-03-17 RX ADMIN — HYDROMORPHONE HYDROCHLORIDE 1 MILLIGRAM(S): 2 INJECTION INTRAMUSCULAR; INTRAVENOUS; SUBCUTANEOUS at 21:56

## 2020-03-17 RX ADMIN — Medication 975 MILLIGRAM(S): at 13:30

## 2020-03-17 NOTE — PROGRESS NOTE ADULT - SUBJECTIVE AND OBJECTIVE BOX
Vascular Progress Note    S: Patient seen and examined. No acute events overnight. Reports tolerating diet without nausea, vomiting, passing flatus, having bowel movements, voiding without issues. Denies fever, chills, SOB, chest pain.     O:  Physical Exam:  Gen: Laying in bed, NAD  HEENT: atrumatic, EMOI  Resp: Unlabored breathing  Abd: soft, NT,ND, no rebound or guarding.   Vascular: Signals in DP and PT b/l. Distal toes WWP      Vital Signs Last 24 Hrs  T(C): 36.3 (17 Mar 2020 05:14), Max: 36.7 (16 Mar 2020 13:52)  T(F): 97.4 (17 Mar 2020 05:14), Max: 98.1 (16 Mar 2020 21:16)  HR: 60 (17 Mar 2020 05:14) (52 - 88)  BP: 125/76 (17 Mar 2020 05:14) (118/75 - 184/122)  BP(mean): --  RR: 18 (17 Mar 2020 05:14) (16 - 18)  SpO2: 99% (17 Mar 2020 05:14) (95% - 100%)    I&O's Detail    15 Mar 2020 07:01  -  16 Mar 2020 07:00  --------------------------------------------------------  IN:    heparin Infusion: 272 mL    Oral Fluid: 1080 mL  Total IN: 1352 mL    OUT:    Voided: 1300 mL  Total OUT: 1300 mL    Total NET: 52 mL      16 Mar 2020 07:01  -  17 Mar 2020 05:20  --------------------------------------------------------  IN:    heparin Infusion: 252 mL    Oral Fluid: 960 mL  Total IN: 1212 mL    OUT:    Voided: 300 mL  Total OUT: 300 mL    Total NET: 912 mL                                12.0   5.94  )-----------( 226      ( 16 Mar 2020 06:41 )             38.9       03-16    138  |  104  |  10  ----------------------------<  82  3.8   |  20<L>  |  1.18    Ca    9.7      16 Mar 2020 06:41  Phos  3.1     03-16  Mg     1.7     03-16

## 2020-03-17 NOTE — PROGRESS NOTE ADULT - ASSESSMENT
59M PMHx CAD s/p stent x2 to LAD with negative cath 2019 per chart review, HTN, HLD, COPD, PAD s/p R-->L fem-fem ('04) & left SFA stent ('19), former 1 ppd x30 year smoker now presenting to the ED c/o severe worsening LLE x2 days, with concern for acute on chronic arterial occlusion.    PLAN:  - Pre op for tomorrow  - Heparin gtt  - Monitor PTT  - Regular diet  - Will hold home ASA and continue plavix  - Medicine cleared  - Cards cleared    Vascular Surgery   [id number]
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
## 2020-03-18 LAB
ANION GAP SERPL CALC-SCNC: 16 MMOL/L — SIGNIFICANT CHANGE UP (ref 5–17)
ANION GAP SERPL CALC-SCNC: 19 MMOL/L — HIGH (ref 5–17)
BLD GP AB SCN SERPL QL: NEGATIVE — SIGNIFICANT CHANGE UP
BUN SERPL-MCNC: 12 MG/DL — SIGNIFICANT CHANGE UP (ref 7–23)
BUN SERPL-MCNC: 13 MG/DL — SIGNIFICANT CHANGE UP (ref 7–23)
CALCIUM SERPL-MCNC: 10.1 MG/DL — SIGNIFICANT CHANGE UP (ref 8.4–10.5)
CALCIUM SERPL-MCNC: 9.7 MG/DL — SIGNIFICANT CHANGE UP (ref 8.4–10.5)
CHLORIDE SERPL-SCNC: 104 MMOL/L — SIGNIFICANT CHANGE UP (ref 96–108)
CHLORIDE SERPL-SCNC: 105 MMOL/L — SIGNIFICANT CHANGE UP (ref 96–108)
CO2 SERPL-SCNC: 15 MMOL/L — LOW (ref 22–31)
CO2 SERPL-SCNC: 20 MMOL/L — LOW (ref 22–31)
CREAT SERPL-MCNC: 1.11 MG/DL — SIGNIFICANT CHANGE UP (ref 0.5–1.3)
CREAT SERPL-MCNC: 1.19 MG/DL — SIGNIFICANT CHANGE UP (ref 0.5–1.3)
GAS PNL BLDA: SIGNIFICANT CHANGE UP
GLUCOSE SERPL-MCNC: 176 MG/DL — HIGH (ref 70–99)
GLUCOSE SERPL-MCNC: 91 MG/DL — SIGNIFICANT CHANGE UP (ref 70–99)
HCT VFR BLD CALC: 35.4 % — LOW (ref 39–50)
HCT VFR BLD CALC: 41.9 % — SIGNIFICANT CHANGE UP (ref 39–50)
HGB BLD-MCNC: 11 G/DL — LOW (ref 13–17)
HGB BLD-MCNC: 13.3 G/DL — SIGNIFICANT CHANGE UP (ref 13–17)
MAGNESIUM SERPL-MCNC: 1.5 MG/DL — LOW (ref 1.6–2.6)
MAGNESIUM SERPL-MCNC: 1.6 MG/DL — SIGNIFICANT CHANGE UP (ref 1.6–2.6)
MCHC RBC-ENTMCNC: 31.1 GM/DL — LOW (ref 32–36)
MCHC RBC-ENTMCNC: 31.3 PG — SIGNIFICANT CHANGE UP (ref 27–34)
MCHC RBC-ENTMCNC: 31.6 PG — SIGNIFICANT CHANGE UP (ref 27–34)
MCHC RBC-ENTMCNC: 31.7 GM/DL — LOW (ref 32–36)
MCV RBC AUTO: 101.7 FL — HIGH (ref 80–100)
MCV RBC AUTO: 98.6 FL — SIGNIFICANT CHANGE UP (ref 80–100)
NRBC # BLD: 0 /100 WBCS — SIGNIFICANT CHANGE UP (ref 0–0)
NRBC # BLD: 0 /100 WBCS — SIGNIFICANT CHANGE UP (ref 0–0)
PHOSPHATE SERPL-MCNC: 2.4 MG/DL — LOW (ref 2.5–4.5)
PHOSPHATE SERPL-MCNC: 4.7 MG/DL — HIGH (ref 2.5–4.5)
PLATELET # BLD AUTO: 264 K/UL — SIGNIFICANT CHANGE UP (ref 150–400)
PLATELET # BLD AUTO: 286 K/UL — SIGNIFICANT CHANGE UP (ref 150–400)
POTASSIUM SERPL-MCNC: 3.6 MMOL/L — SIGNIFICANT CHANGE UP (ref 3.5–5.3)
POTASSIUM SERPL-MCNC: 3.8 MMOL/L — SIGNIFICANT CHANGE UP (ref 3.5–5.3)
POTASSIUM SERPL-SCNC: 3.6 MMOL/L — SIGNIFICANT CHANGE UP (ref 3.5–5.3)
POTASSIUM SERPL-SCNC: 3.8 MMOL/L — SIGNIFICANT CHANGE UP (ref 3.5–5.3)
RBC # BLD: 3.48 M/UL — LOW (ref 4.2–5.8)
RBC # BLD: 4.25 M/UL — SIGNIFICANT CHANGE UP (ref 4.2–5.8)
RBC # FLD: 12.8 % — SIGNIFICANT CHANGE UP (ref 10.3–14.5)
RBC # FLD: 13 % — SIGNIFICANT CHANGE UP (ref 10.3–14.5)
RH IG SCN BLD-IMP: POSITIVE — SIGNIFICANT CHANGE UP
SODIUM SERPL-SCNC: 139 MMOL/L — SIGNIFICANT CHANGE UP (ref 135–145)
SODIUM SERPL-SCNC: 140 MMOL/L — SIGNIFICANT CHANGE UP (ref 135–145)
WBC # BLD: 8.07 K/UL — SIGNIFICANT CHANGE UP (ref 3.8–10.5)
WBC # BLD: 9.87 K/UL — SIGNIFICANT CHANGE UP (ref 3.8–10.5)
WBC # FLD AUTO: 8.07 K/UL — SIGNIFICANT CHANGE UP (ref 3.8–10.5)
WBC # FLD AUTO: 9.87 K/UL — SIGNIFICANT CHANGE UP (ref 3.8–10.5)

## 2020-03-18 PROCEDURE — 35661 BPG FEMORAL-FEMORAL: CPT | Mod: 62

## 2020-03-18 PROCEDURE — 35656 BPG FEMORAL-POPLITEAL: CPT | Mod: 62

## 2020-03-18 PROCEDURE — 35700 REOPERATION BYPASS GRAFT: CPT | Mod: 62

## 2020-03-18 PROCEDURE — 99233 SBSQ HOSP IP/OBS HIGH 50: CPT

## 2020-03-18 RX ORDER — ACETAMINOPHEN 500 MG
975 TABLET ORAL EVERY 6 HOURS
Refills: 0 | Status: DISCONTINUED | OUTPATIENT
Start: 2020-03-18 | End: 2020-03-20

## 2020-03-18 RX ORDER — SODIUM CHLORIDE 9 MG/ML
1000 INJECTION, SOLUTION INTRAVENOUS
Refills: 0 | Status: DISCONTINUED | OUTPATIENT
Start: 2020-03-18 | End: 2020-03-19

## 2020-03-18 RX ORDER — ONDANSETRON 8 MG/1
4 TABLET, FILM COATED ORAL ONCE
Refills: 0 | Status: DISCONTINUED | OUTPATIENT
Start: 2020-03-18 | End: 2020-03-19

## 2020-03-18 RX ORDER — PHENYLEPHRINE HYDROCHLORIDE 10 MG/ML
0.3 INJECTION INTRAVENOUS
Qty: 40 | Refills: 0 | Status: DISCONTINUED | OUTPATIENT
Start: 2020-03-18 | End: 2020-03-19

## 2020-03-18 RX ORDER — DULOXETINE HYDROCHLORIDE 30 MG/1
30 CAPSULE, DELAYED RELEASE ORAL DAILY
Refills: 0 | Status: DISCONTINUED | OUTPATIENT
Start: 2020-03-18 | End: 2020-03-20

## 2020-03-18 RX ORDER — MAGNESIUM SULFATE 500 MG/ML
2 VIAL (ML) INJECTION ONCE
Refills: 0 | Status: COMPLETED | OUTPATIENT
Start: 2020-03-18 | End: 2020-03-18

## 2020-03-18 RX ORDER — APIXABAN 2.5 MG/1
2.5 TABLET, FILM COATED ORAL
Refills: 0 | Status: DISCONTINUED | OUTPATIENT
Start: 2020-03-18 | End: 2020-03-19

## 2020-03-18 RX ORDER — OXYCODONE HYDROCHLORIDE 5 MG/1
5 TABLET ORAL EVERY 6 HOURS
Refills: 0 | Status: DISCONTINUED | OUTPATIENT
Start: 2020-03-18 | End: 2020-03-20

## 2020-03-18 RX ORDER — ATORVASTATIN CALCIUM 80 MG/1
80 TABLET, FILM COATED ORAL AT BEDTIME
Refills: 0 | Status: DISCONTINUED | OUTPATIENT
Start: 2020-03-18 | End: 2020-03-20

## 2020-03-18 RX ORDER — HYDROMORPHONE HYDROCHLORIDE 2 MG/ML
0.5 INJECTION INTRAMUSCULAR; INTRAVENOUS; SUBCUTANEOUS
Refills: 0 | Status: DISCONTINUED | OUTPATIENT
Start: 2020-03-18 | End: 2020-03-19

## 2020-03-18 RX ORDER — ASPIRIN/CALCIUM CARB/MAGNESIUM 324 MG
81 TABLET ORAL DAILY
Refills: 0 | Status: DISCONTINUED | OUTPATIENT
Start: 2020-03-18 | End: 2020-03-20

## 2020-03-18 RX ORDER — OXYCODONE HYDROCHLORIDE 5 MG/1
10 TABLET ORAL EVERY 6 HOURS
Refills: 0 | Status: DISCONTINUED | OUTPATIENT
Start: 2020-03-18 | End: 2020-03-20

## 2020-03-18 RX ADMIN — HYDROMORPHONE HYDROCHLORIDE 0.5 MILLIGRAM(S): 2 INJECTION INTRAMUSCULAR; INTRAVENOUS; SUBCUTANEOUS at 19:37

## 2020-03-18 RX ADMIN — Medication 25 MILLIGRAM(S): at 05:34

## 2020-03-18 RX ADMIN — ATORVASTATIN CALCIUM 80 MILLIGRAM(S): 80 TABLET, FILM COATED ORAL at 22:47

## 2020-03-18 RX ADMIN — HYDROMORPHONE HYDROCHLORIDE 0.5 MILLIGRAM(S): 2 INJECTION INTRAMUSCULAR; INTRAVENOUS; SUBCUTANEOUS at 19:25

## 2020-03-18 RX ADMIN — Medication 975 MILLIGRAM(S): at 05:34

## 2020-03-18 RX ADMIN — LISINOPRIL 40 MILLIGRAM(S): 2.5 TABLET ORAL at 05:34

## 2020-03-18 RX ADMIN — SODIUM CHLORIDE 50 MILLILITER(S): 9 INJECTION, SOLUTION INTRAVENOUS at 19:25

## 2020-03-18 RX ADMIN — HYDROMORPHONE HYDROCHLORIDE 0.5 MILLIGRAM(S): 2 INJECTION INTRAMUSCULAR; INTRAVENOUS; SUBCUTANEOUS at 19:52

## 2020-03-18 RX ADMIN — Medication 50 GRAM(S): at 20:42

## 2020-03-18 RX ADMIN — PHENYLEPHRINE HYDROCHLORIDE 13.1 MICROGRAM(S)/KG/MIN: 10 INJECTION INTRAVENOUS at 19:24

## 2020-03-18 RX ADMIN — AMLODIPINE BESYLATE 5 MILLIGRAM(S): 2.5 TABLET ORAL at 05:34

## 2020-03-18 NOTE — BRIEF OPERATIVE NOTE - OPERATION/FINDINGS
right to left fem-fem bypass with PTFE  left fem to above knee popliteal bypass with PTFE  left DP/PT/peroneal signals at end of case

## 2020-03-18 NOTE — BRIEF OPERATIVE NOTE - NSICDXBRIEFPROCEDURE_GEN_ALL_CORE_FT
PROCEDURES:  Femoral-popliteal bypass graft with non-vein 18-Mar-2020 18:51:35  Janna Carlson  Bypass graft, femoral-femoral, with non-vein 18-Mar-2020 18:51:17  Janna Carlson
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
## 2020-03-18 NOTE — PRE-ANESTHESIA EVALUATION ADULT - NSANTHPMHFT_GEN_ALL_CORE
H&P  58 yo M  PMHx -  CAD s/p stent x2 to LAD with negative cath 2019  HTN/HLD  COPD  TIA s/p carotid endarterectomy  Obesity/CANDELARIA  PAD s/p R-->L fem-fem ('04) & left SFA stent ('19)  Former 1 ppd x30 year smoker  Presenting to the ED c/o severe worsening LLE x2 days, with concern for acute on chronic arterial occlusion.    Stress Test:  VENTRICLES: EF estimated was 55 %.  CORONARY VESSELS: The coronary circulation is right dominant.  LM:   --  LM: Normal.  LAD:   --  LAD: Normal.  CX:   --  Circumflex: Normal.  RCA:   --  RCA: Normal.  COMPLICATIONS: There were no complications.    Cardiology clearance note in chart H&P  58 yo M  PMHx -  CAD s/p stent x2 to LAD with negative cath 2019  HTN/HLD  COPD  TIA s/p carotid endarterectomy  Obesity/CANDELARIA  PAD s/p R-->L fem-fem ('04) & left SFA stent ('19)  Former 1 ppd x30 year smoker  Presenting to the ED c/o severe worsening LLE x2 days, with concern for acute on chronic arterial occlusion.    Cath:  VENTRICLES: EF estimated was 55 %.  CORONARY VESSELS: The coronary circulation is right dominant.  LM:   --  LM: Normal.  LAD:   --  LAD: Normal.  CX:   --  Circumflex: Normal.  RCA:   --  RCA: Normal.  COMPLICATIONS: There were no complications.    Cardiology clearance note in chart

## 2020-03-18 NOTE — PROGRESS NOTE ADULT - SUBJECTIVE AND OBJECTIVE BOX
Vascular Progress Note    S: Patient seen and examined. No acute events overnight. Reports tolerating diet without nausea, vomiting, passing flatus, having bowel movements, voiding without issues. Denies fever, chills, SOB, chest pain. NPO since midnight. Heparin drip held at 5:30 AM.     O:  Physical Exam:  Gen: Laying in bed, NAD  HEENT: atrumatic, EMOI  Resp: Unlabored breathing  Abd: soft, NT,ND, no rebound or guarding.   Vascular: Signals in DP and PT RIGHT FOOT. No DP.pt signals in left foot Distal toes WWP    Vital Signs Last 24 Hrs  T(C): 36.4 (18 Mar 2020 05:49), Max: 37.2 (17 Mar 2020 21:05)  T(F): 97.6 (18 Mar 2020 05:49), Max: 98.9 (17 Mar 2020 21:05)  HR: 76 (18 Mar 2020 05:49) (58 - 90)  BP: 169/95 (18 Mar 2020 05:49) (115/77 - 169/95)  BP(mean): --  RR: 20 (18 Mar 2020 05:49) (16 - 20)  SpO2: 99% (18 Mar 2020 05:49) (95% - 99%)      16 Mar 2020 07:01  -  17 Mar 2020 07:00  --------------------------------------------------------  IN:    heparin Infusion: 288 mL    Oral Fluid: 1200 mL  Total IN: 1488 mL    OUT:    Voided: 650 mL  Total OUT: 650 mL    Total NET: 838 mL      17 Mar 2020 07:01  -  18 Mar 2020 06:25  --------------------------------------------------------  IN:    heparin Infusion: 264 mL    Oral Fluid: 960 mL  Total IN: 1224 mL    OUT:    Voided: 825 mL  Total OUT: 825 mL    Total NET: 399 mL                     LABS:      CBC Full  -  ( 17 Mar 2020 05:24 )  WBC Count : 6.71 K/uL  RBC Count : 3.98 M/uL  Hemoglobin : 12.8 g/dL  Hematocrit : 39.1 %  Platelet Count - Automated : 240 K/uL  Mean Cell Volume : 98.2 fl  Mean Cell Hemoglobin : 32.2 pg  Mean Cell Hemoglobin Concentration : 32.7 gm/dL  Auto Neutrophil # : x  Auto Lymphocyte # : x  Auto Monocyte # : x  Auto Eosinophil # : x  Auto Basophil # : x  Auto Neutrophil % : x  Auto Lymphocyte % : x  Auto Monocyte % : x  Auto Eosinophil % : x  Auto Basophil % : x    03-17    141  |  104  |  10  ----------------------------<  96  4.0   |  23  |  1.13    Ca    9.6      17 Mar 2020 05:24  Phos  3.1     03-17  Mg     1.7     03-17      PTT - ( 17 Mar 2020 05:24 )  PTT:83.9 sec                59M PMHx CAD s/p stent x2 to LAD with negative cath 2019 per chart review, HTN, HLD, COPD, PAD s/p R-->L fem-fem ('04) & left SFA stent ('19), former 1 ppd x30 year smoker now presenting to the ED c/o severe worsening LLE x2 days, with concern for acute on chronic arterial occlusion.    PLAN:  - OR today   - Heparin gtt held at 5:30 AM  - NPO  - Will hold home ASA and continue plavix  - Medicine cleared, Cards cleared    Vascular Surgery   [id number]
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
## 2020-03-19 LAB
ANION GAP SERPL CALC-SCNC: 14 MMOL/L — SIGNIFICANT CHANGE UP (ref 5–17)
ANION GAP SERPL CALC-SCNC: 17 MMOL/L — SIGNIFICANT CHANGE UP (ref 5–17)
ANION GAP SERPL CALC-SCNC: 18 MMOL/L — HIGH (ref 5–17)
APTT BLD: >200 SEC — CRITICAL HIGH (ref 27.5–36.3)
BUN SERPL-MCNC: 14 MG/DL — SIGNIFICANT CHANGE UP (ref 7–23)
BUN SERPL-MCNC: 20 MG/DL — SIGNIFICANT CHANGE UP (ref 7–23)
BUN SERPL-MCNC: 20 MG/DL — SIGNIFICANT CHANGE UP (ref 7–23)
CALCIUM SERPL-MCNC: 8.8 MG/DL — SIGNIFICANT CHANGE UP (ref 8.4–10.5)
CALCIUM SERPL-MCNC: 9.2 MG/DL — SIGNIFICANT CHANGE UP (ref 8.4–10.5)
CALCIUM SERPL-MCNC: 9.3 MG/DL — SIGNIFICANT CHANGE UP (ref 8.4–10.5)
CHLORIDE SERPL-SCNC: 100 MMOL/L — SIGNIFICANT CHANGE UP (ref 96–108)
CHLORIDE SERPL-SCNC: 102 MMOL/L — SIGNIFICANT CHANGE UP (ref 96–108)
CHLORIDE SERPL-SCNC: 99 MMOL/L — SIGNIFICANT CHANGE UP (ref 96–108)
CO2 SERPL-SCNC: 18 MMOL/L — LOW (ref 22–31)
CO2 SERPL-SCNC: 19 MMOL/L — LOW (ref 22–31)
CO2 SERPL-SCNC: 20 MMOL/L — LOW (ref 22–31)
CREAT SERPL-MCNC: 1.35 MG/DL — HIGH (ref 0.5–1.3)
CREAT SERPL-MCNC: 1.67 MG/DL — HIGH (ref 0.5–1.3)
CREAT SERPL-MCNC: 1.81 MG/DL — HIGH (ref 0.5–1.3)
GLUCOSE SERPL-MCNC: 100 MG/DL — HIGH (ref 70–99)
GLUCOSE SERPL-MCNC: 113 MG/DL — HIGH (ref 70–99)
GLUCOSE SERPL-MCNC: 129 MG/DL — HIGH (ref 70–99)
HCT VFR BLD CALC: 30.7 % — LOW (ref 39–50)
HCT VFR BLD CALC: 33.2 % — LOW (ref 39–50)
HCT VFR BLD CALC: 36.9 % — LOW (ref 39–50)
HGB BLD-MCNC: 10.4 G/DL — LOW (ref 13–17)
HGB BLD-MCNC: 11.5 G/DL — LOW (ref 13–17)
HGB BLD-MCNC: 9.7 G/DL — LOW (ref 13–17)
LACTATE SERPL-SCNC: 1.4 MMOL/L — SIGNIFICANT CHANGE UP (ref 0.7–2)
LACTATE SERPL-SCNC: 2.9 MMOL/L — HIGH (ref 0.7–2)
MAGNESIUM SERPL-MCNC: 1.8 MG/DL — SIGNIFICANT CHANGE UP (ref 1.6–2.6)
MAGNESIUM SERPL-MCNC: 1.9 MG/DL — SIGNIFICANT CHANGE UP (ref 1.6–2.6)
MCHC RBC-ENTMCNC: 31.2 GM/DL — LOW (ref 32–36)
MCHC RBC-ENTMCNC: 31.3 GM/DL — LOW (ref 32–36)
MCHC RBC-ENTMCNC: 31.6 GM/DL — LOW (ref 32–36)
MCHC RBC-ENTMCNC: 31.7 PG — SIGNIFICANT CHANGE UP (ref 27–34)
MCHC RBC-ENTMCNC: 31.8 PG — SIGNIFICANT CHANGE UP (ref 27–34)
MCHC RBC-ENTMCNC: 32 PG — SIGNIFICANT CHANGE UP (ref 27–34)
MCV RBC AUTO: 100.3 FL — HIGH (ref 80–100)
MCV RBC AUTO: 101.9 FL — HIGH (ref 80–100)
MCV RBC AUTO: 102.2 FL — HIGH (ref 80–100)
NRBC # BLD: 0 /100 WBCS — SIGNIFICANT CHANGE UP (ref 0–0)
PHOSPHATE SERPL-MCNC: 3.5 MG/DL — SIGNIFICANT CHANGE UP (ref 2.5–4.5)
PHOSPHATE SERPL-MCNC: 3.7 MG/DL — SIGNIFICANT CHANGE UP (ref 2.5–4.5)
PLATELET # BLD AUTO: 231 K/UL — SIGNIFICANT CHANGE UP (ref 150–400)
PLATELET # BLD AUTO: 251 K/UL — SIGNIFICANT CHANGE UP (ref 150–400)
PLATELET # BLD AUTO: 302 K/UL — SIGNIFICANT CHANGE UP (ref 150–400)
POTASSIUM SERPL-MCNC: 3.6 MMOL/L — SIGNIFICANT CHANGE UP (ref 3.5–5.3)
POTASSIUM SERPL-MCNC: 4 MMOL/L — SIGNIFICANT CHANGE UP (ref 3.5–5.3)
POTASSIUM SERPL-MCNC: 4 MMOL/L — SIGNIFICANT CHANGE UP (ref 3.5–5.3)
POTASSIUM SERPL-SCNC: 3.6 MMOL/L — SIGNIFICANT CHANGE UP (ref 3.5–5.3)
POTASSIUM SERPL-SCNC: 4 MMOL/L — SIGNIFICANT CHANGE UP (ref 3.5–5.3)
POTASSIUM SERPL-SCNC: 4 MMOL/L — SIGNIFICANT CHANGE UP (ref 3.5–5.3)
RBC # BLD: 3.06 M/UL — LOW (ref 4.2–5.8)
RBC # BLD: 3.25 M/UL — LOW (ref 4.2–5.8)
RBC # BLD: 3.62 M/UL — LOW (ref 4.2–5.8)
RBC # FLD: 13.2 % — SIGNIFICANT CHANGE UP (ref 10.3–14.5)
RBC # FLD: 13.2 % — SIGNIFICANT CHANGE UP (ref 10.3–14.5)
RBC # FLD: 13.3 % — SIGNIFICANT CHANGE UP (ref 10.3–14.5)
SODIUM SERPL-SCNC: 134 MMOL/L — LOW (ref 135–145)
SODIUM SERPL-SCNC: 136 MMOL/L — SIGNIFICANT CHANGE UP (ref 135–145)
SODIUM SERPL-SCNC: 137 MMOL/L — SIGNIFICANT CHANGE UP (ref 135–145)
TROPONIN T, HIGH SENSITIVITY RESULT: 21 NG/L — SIGNIFICANT CHANGE UP (ref 0–51)
WBC # BLD: 11.37 K/UL — HIGH (ref 3.8–10.5)
WBC # BLD: 13.91 K/UL — HIGH (ref 3.8–10.5)
WBC # BLD: 15.83 K/UL — HIGH (ref 3.8–10.5)
WBC # FLD AUTO: 11.37 K/UL — HIGH (ref 3.8–10.5)
WBC # FLD AUTO: 13.91 K/UL — HIGH (ref 3.8–10.5)
WBC # FLD AUTO: 15.83 K/UL — HIGH (ref 3.8–10.5)

## 2020-03-19 PROCEDURE — 99233 SBSQ HOSP IP/OBS HIGH 50: CPT

## 2020-03-19 PROCEDURE — 93010 ELECTROCARDIOGRAM REPORT: CPT

## 2020-03-19 PROCEDURE — 75635 CT ANGIO ABDOMINAL ARTERIES: CPT | Mod: 26

## 2020-03-19 RX ORDER — APIXABAN 2.5 MG/1
2.5 TABLET, FILM COATED ORAL
Refills: 0 | Status: DISCONTINUED | OUTPATIENT
Start: 2020-03-19 | End: 2020-03-19

## 2020-03-19 RX ORDER — HEPARIN SODIUM 5000 [USP'U]/ML
2100 INJECTION INTRAVENOUS; SUBCUTANEOUS
Qty: 25000 | Refills: 0 | Status: DISCONTINUED | OUTPATIENT
Start: 2020-03-18 | End: 2020-03-19

## 2020-03-19 RX ORDER — RIVAROXABAN 15 MG-20MG
2.5 KIT ORAL
Refills: 0 | Status: DISCONTINUED | OUTPATIENT
Start: 2020-03-19 | End: 2020-03-20

## 2020-03-19 RX ORDER — SODIUM CHLORIDE 9 MG/ML
1000 INJECTION INTRAMUSCULAR; INTRAVENOUS; SUBCUTANEOUS ONCE
Refills: 0 | Status: COMPLETED | OUTPATIENT
Start: 2020-03-19 | End: 2020-03-19

## 2020-03-19 RX ADMIN — OXYCODONE HYDROCHLORIDE 10 MILLIGRAM(S): 5 TABLET ORAL at 18:13

## 2020-03-19 RX ADMIN — OXYCODONE HYDROCHLORIDE 10 MILLIGRAM(S): 5 TABLET ORAL at 11:27

## 2020-03-19 RX ADMIN — HYDROMORPHONE HYDROCHLORIDE 0.5 MILLIGRAM(S): 2 INJECTION INTRAMUSCULAR; INTRAVENOUS; SUBCUTANEOUS at 01:53

## 2020-03-19 RX ADMIN — Medication 975 MILLIGRAM(S): at 04:33

## 2020-03-19 RX ADMIN — RIVAROXABAN 2.5 MILLIGRAM(S): KIT at 21:09

## 2020-03-19 RX ADMIN — OXYCODONE HYDROCHLORIDE 10 MILLIGRAM(S): 5 TABLET ORAL at 10:57

## 2020-03-19 RX ADMIN — Medication 975 MILLIGRAM(S): at 18:14

## 2020-03-19 RX ADMIN — OXYCODONE HYDROCHLORIDE 10 MILLIGRAM(S): 5 TABLET ORAL at 04:34

## 2020-03-19 RX ADMIN — HEPARIN SODIUM 21 UNIT(S)/HR: 5000 INJECTION INTRAVENOUS; SUBCUTANEOUS at 00:30

## 2020-03-19 RX ADMIN — HYDROMORPHONE HYDROCHLORIDE 0.5 MILLIGRAM(S): 2 INJECTION INTRAMUSCULAR; INTRAVENOUS; SUBCUTANEOUS at 02:10

## 2020-03-19 RX ADMIN — HYDROMORPHONE HYDROCHLORIDE 0.5 MILLIGRAM(S): 2 INJECTION INTRAMUSCULAR; INTRAVENOUS; SUBCUTANEOUS at 01:38

## 2020-03-19 RX ADMIN — Medication 975 MILLIGRAM(S): at 14:56

## 2020-03-19 RX ADMIN — ATORVASTATIN CALCIUM 80 MILLIGRAM(S): 80 TABLET, FILM COATED ORAL at 21:08

## 2020-03-19 RX ADMIN — OXYCODONE HYDROCHLORIDE 10 MILLIGRAM(S): 5 TABLET ORAL at 05:03

## 2020-03-19 RX ADMIN — Medication 81 MILLIGRAM(S): at 14:55

## 2020-03-19 RX ADMIN — Medication 1 TABLET(S): at 14:55

## 2020-03-19 RX ADMIN — SODIUM CHLORIDE 1000 MILLILITER(S): 9 INJECTION INTRAMUSCULAR; INTRAVENOUS; SUBCUTANEOUS at 14:54

## 2020-03-19 RX ADMIN — DULOXETINE HYDROCHLORIDE 30 MILLIGRAM(S): 30 CAPSULE, DELAYED RELEASE ORAL at 14:54

## 2020-03-19 RX ADMIN — Medication 975 MILLIGRAM(S): at 05:03

## 2020-03-19 RX ADMIN — RIVAROXABAN 2.5 MILLIGRAM(S): KIT at 09:58
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
## 2020-03-19 NOTE — PHYSICAL THERAPY INITIAL EVALUATION ADULT - PLANNED THERAPY INTERVENTIONS, PT EVAL
gait training/bed mobility training/transfer training/STAIRS GOAL: pt will negotiate 1 flight of stairs independently with B HR by 2 weeks

## 2020-03-19 NOTE — DIETITIAN INITIAL EVALUATION ADULT. - ADD RECOMMEND
Regular diet, Monitor diet tolerance, po intake, GI tolerance, weight trends, labs, and skin integrity, suggest Vit C and Vit d

## 2020-03-19 NOTE — PHYSICAL THERAPY INITIAL EVALUATION ADULT - PATIENT/FAMILY AGREES WITH PLAN
Monitor for changes. Advised patient to call our office with decreased vision or increased symptoms. yes

## 2020-03-19 NOTE — DIETITIAN INITIAL EVALUATION ADULT. - ENERGY NEEDS
HT 75 inches,  pounds,  pounds, BMI 32.2  Dxd with PMHx CAD s/p stent x2 to LAD with negative cath 2019 per chart review, HTN, HLD, COPD, PAD s/p R-->L fem-fem ('04) & left SFA stent ('19), former 1 ppd x30 year smoker now presenting to the ED c/o severe worsening LLE x2 days, with concern for acute on chronic arterial occlusion. POD1 sp R to L fem fem bypass w PTFE and L fem to above pop bypass w PTFE.  Skin intact except for surgical wounds.

## 2020-03-19 NOTE — PROGRESS NOTE ADULT - SUBJECTIVE AND OBJECTIVE BOX
Vascular Progress Note    S: Patient seen and examined. No acute events overnight. Reports tolerating diet without nausea, vomiting, passing flatus, having bowel movements, voiding without issues. Denies fever, chills, SOB, chest pain. Heparin drip     O:  Vital Signs Last 24 Hrs  T(C): 36.2 (19 Mar 2020 00:00), Max: 36.5 (18 Mar 2020 11:28)  T(F): 97.2 (19 Mar 2020 00:00), Max: 97.7 (18 Mar 2020 11:28)  HR: 77 (19 Mar 2020 02:00) (56 - 94)  BP: 109/51 (19 Mar 2020 02:00) (93/62 - 199/84)  BP(mean): 74 (19 Mar 2020 02:00) (72 - 119)  RR: 15 (19 Mar 2020 02:00) (14 - 20)  SpO2: 100% (19 Mar 2020 02:00) (94% - 100%)    Exam:  Gen: NAD, resting in bed, alert and responding appropriately  Resp: Airway patent, non-labored respirations  Abd: Soft, ND, NTTP x 4 quadrants, no rebound or guarding.   : Shipley clear yellow urine  Groin: Dressings x2 cdi  Ext: No edema, LLE: Thigh dressing cdi SILT S/S/SP/DP MI TA/G/S/FHL/EHL dopplerable perineal DP and PT pulses RLE: SILT S/S/SP/DP MI TA/G/S/FHL/EHL   Neuro: AAOx3, no focal deficits    LABS:             11.0   9.87  )-----------( 286      ( 18 Mar 2020 19:33 )             35.4     03-18    139  |  105  |  13  ----------------------------<  176<H>  3.8   |  15<L>  |  1.11    Ca    9.7      18 Mar 2020 19:33  Phos  4.7     03-18  Mg     1.5     03-18 Vascular Progress Note    S: Patient seen and examined. No acute events overnight. Reports tolerating diet without nausea, vomiting, passing flatus, having bowel movements, voiding without issues. Denies fever, chills, SOB, chest pain. Heparin drip Counselled regarding scan    O:  Vital Signs Last 24 Hrs  T(C): 36.2 (19 Mar 2020 00:00), Max: 36.5 (18 Mar 2020 11:28)  T(F): 97.2 (19 Mar 2020 00:00), Max: 97.7 (18 Mar 2020 11:28)  HR: 77 (19 Mar 2020 02:00) (56 - 94)  BP: 109/51 (19 Mar 2020 02:00) (93/62 - 199/84)  BP(mean): 74 (19 Mar 2020 02:00) (72 - 119)  RR: 15 (19 Mar 2020 02:00) (14 - 20)  SpO2: 100% (19 Mar 2020 02:00) (94% - 100%)    Exam:  Gen: NAD, resting in bed, alert and responding appropriately  Resp: Airway patent, non-labored respirations  Abd: Soft, ND, NTTP x 4 quadrants, no rebound or guarding.   : Shipley clear yellow urine  Groin: Dressings x2 cdi  Ext: No edema, WWP LLE: Thigh dressing spotting SILT S/S/SP/DP MI TA/G/S/FHL/EHL dopplerable perineal DP and PT pulses RLE: SILT S/S/SP/DP MI TA/G/S/FHL/EHL   Neuro: AAOx3, no focal deficits    LABS:             11.0   9.87  )-----------( 286      ( 18 Mar 2020 19:33 )             35.4     03-18    139  |  105  |  13  ----------------------------<  176<H>  3.8   |  15<L>  |  1.11    Ca    9.7      18 Mar 2020 19:33  Phos  4.7     03-18  Mg     1.5     03-18

## 2020-03-19 NOTE — CHART NOTE - NSCHARTNOTEFT_GEN_A_CORE
Subjective   Patient seen at the bedside with Dr. Maguire. Patient with a complaint of weakness and dizziness. Patient endorses pain in the bilateral groins. Denies headache, shortness of breath, chest pain, palpitations, abdominal pain, nausea, and vomiting.     Objective:   Vitals:   BP: 81/22  HR 83  SpO2: 100% on room air   RR: 18                           11.5   15.83 )-----------( 302      ( 19 Mar 2020 06:53 )             36.9       General: resting in bed, nervous   Respiratory: non labored breathing on room air, equal chest rise  CV: regular rate and rhythm   Abdomen: soft, nontender, obese   Groin: surgical Aquacel in place, bilateral groins soft, appropriately tender to palpation over center of dressing  Vascular: Left DP signal , Right PT/DP signal       Assessment/Plan:   59M PMHx CAD s/p stent x2 to LAD with negative cath 2019 per chart review, HTN, HLD, COPD, PAD s/p R-->L fem-fem ('04) & left SFA stent ('19), former 1 ppd x30 year smoker now presenting to the ED c/o severe worsening LLE x2 days, with concern for acute on chronic arterial occlusion. POD1 sp R to L fem fem bypass w PTFE and L fem to above pop bypass w PTFE, POD 1. Now the hypotension.     - STAT labs (CBC, BMP, Mg, Phos, Troponin)   - Normal Saline 1 L bolus  - monitor vital signs   - patient seen and evaluated with Dr. Ting Mcdaniel PAEvanC  [id number]  Vascular Surgery

## 2020-03-19 NOTE — DIETITIAN INITIAL EVALUATION ADULT. - OTHER INFO
Patient seen for routine length of stay assessment. Patient found sitting on edge of bed, complains of dizziness and weakness s/p anesthesia.   Patient reports to be eating fairly now and PTA related to pain and post op.  No special diet PTA and no supplementation.  Currently on multivitamin.  Unsure of actual weight because he does not weigh self but does not think he has lost weight.  Not the best of eaters and eats one meal daily.  Reinforced the importance of more frequent meals and including lean proteins, fruits and vegetables for healing and repair.  Discussed benefit of Vit C and D on immune function.

## 2020-03-19 NOTE — PROVIDER CONTACT NOTE (CRITICAL VALUE NOTIFICATION) - ACTION/TREATMENT ORDERED:
Hold heparin drip for one hour.
Vascular team notified. Christie HALEY made aware, as per heparin nomogram, hold for 1 hr and decrease by 4 ml/hr, continue to monitor
PAULINE Kat aware, Heparin drip was d/c as per order, Pt to be given PO xarelto 1 hour after heparin drip was d/c'ed, no further intervention required at this time, will continue to monitor.

## 2020-03-19 NOTE — PHYSICAL THERAPY INITIAL EVALUATION ADULT - PERTINENT HX OF CURRENT PROBLEM, REHAB EVAL
59M PMHx CAD s/p stent x2 to LAD with negative cath 2019 per chart review, HTN, HLD, COPD, PAD s/p R-->L fem-fem ('04) & left SFA stent ('19), former 1 ppd x30 year smoker now presenting to the ED c/o severe worsening RLE x2 days. Patient reports hes been having rest pain for over a month in his left foot, but that in the last two days the pain has increased significantly contd below:

## 2020-03-19 NOTE — PROVIDER CONTACT NOTE (MEDICATION) - ASSESSMENT
Pt A&Ox4, no s/s of distress noted, no s/s of active bleeding noted. Pt resting in bed. Aptt results with >200.00. Heparin stopped per order.

## 2020-03-19 NOTE — PROGRESS NOTE ADULT - ATTENDING COMMENTS
pt w worsening rest pain and thrombosed bypass, acute on chronic ischemia.  intervention is warranted to prevent future limb loss
cont AC, plan fem fem revision/fem pop bypass tmw
The pt has known PAD and SFA occlusion on the right.  On CT his bypass is patent and he has runoff to the ankle bilat.  There is no hematoma or evidence of bleeding.  He had episode of hypotension during pt eval.    he is getting ivf bolus, cards is following.  Will reassess and make sure BP improves.  sending cardiac enzymes  cont asa, xarelto 2.5 bid  No need for full AC

## 2020-03-19 NOTE — PROVIDER CONTACT NOTE (MEDICATION) - ACTION/TREATMENT ORDERED:
As per Meaghan Gonzalez, pt due for PO Eliquis, to be given 1 hour post infusion being stopped. no intervention required at this time, will continue to monitor.

## 2020-03-19 NOTE — PROGRESS NOTE ADULT - ASSESSMENT
59M PMHx CAD s/p stent x2 to LAD with negative cath 2019 per chart review, HTN, HLD, COPD, PAD s/p R-->L fem-fem ('04) & left SFA stent ('19), former 1 ppd x30 year smoker now presenting to the ED c/o severe worsening LLE x2 days, with concern for acute on chronic arterial occlusion. POD1 sp R to L fem fem bypass w PTFE and L fem to above pop bypass w PTFE    Plan:   - ASA  - Eliquis  - Labs  - Holding antihypertensives for now  - Hep gtt    Vascular Surgery   [id number] with any questions 59M PMHx CAD s/p stent x2 to LAD with negative cath 2019 per chart review, HTN, HLD, COPD, PAD s/p R-->L fem-fem ('04) & left SFA stent ('19), former 1 ppd x30 year smoker now presenting to the ED c/o severe worsening LLE x2 days, with concern for acute on chronic arterial occlusion. POD1 sp R to L fem fem bypass w PTFE and L fem to above pop bypass w PTFE    Plan:   - ASA  - Labs  - Holding antihypertensives for now  - Hep gtt  - PT    Vascular Surgery   [id number] with any questions

## 2020-03-19 NOTE — PHYSICAL THERAPY INITIAL EVALUATION ADULT - CRITERIA FOR SKILLED THERAPEUTIC INTERVENTIONS
functional limitations in following categories/predicted duration of therapy intervention/impairments found/therapy frequency/anticipated discharge recommendation/anticipated equipment needs at discharge

## 2020-03-19 NOTE — DIETITIAN INITIAL EVALUATION ADULT. - PERTINENT LABORATORY DATA
Na 137, K+ 4.0, BUN 14, Cr 1.35, , Phos 3.7, Alk Phos --, AST --, ALT --, Mg 1.9, Ca 9.2, HbA1c --  Na 139, K+ 3.8, BUN 13, Cr 1.11, , Phos 4.7, Alk Phos --, AST --, ALT --, Mg 1.5, Ca 9.7, HbA1c --

## 2020-03-19 NOTE — PHYSICAL THERAPY INITIAL EVALUATION ADULT - ADDITIONAL COMMENTS
contd from above:. Its now a 10/10, constant, worst in the foot. He's also experienced some waxing/waning erythema over the foot and lower leg. Patient denied CP, SOB, abdominal pain, N/V/D, cool extremities, fever, numbness CT angio abd/aorta: Status post right to left femorofemoral bypass and left femoral to above-knee jump graft which are patent.Significant narrowing of the right common femoral artery with occlusion of the proximal to mid right femoral artery as at prior imaging. Distal reconstitution on the right the level of the popliteal artery. bilateral lower extremity runoff below the knees.    social history: pt lives with wife in private house, 4 steps to enter, 12 stairs indoors; B HR. pt independent with moblity and did not use assistive device, pt states owns a straight cane. wife assists with ADLs such as donning socks/shoes

## 2020-03-19 NOTE — DIETITIAN INITIAL EVALUATION ADULT. - PERTINENT MEDS FT
MEDICATIONS  (STANDING):  acetaminophen   Tablet .. 975 milliGRAM(s) Oral every 6 hours  aspirin  chewable 81 milliGRAM(s) Oral daily  atorvastatin 80 milliGRAM(s) Oral at bedtime  DULoxetine 30 milliGRAM(s) Oral daily  influenza   Vaccine 0.5 milliLiter(s) IntraMuscular once  multivitamin 1 Tablet(s) Oral daily  rivaroxaban 2.5 milliGRAM(s) Oral <User Schedule>    MEDICATIONS  (PRN):  oxyCODONE    IR 5 milliGRAM(s) Oral every 6 hours PRN Moderate Pain (4 - 6)  oxyCODONE    IR 10 milliGRAM(s) Oral every 6 hours PRN Severe Pain (7 - 10)

## 2020-03-20 ENCOUNTER — TRANSCRIPTION ENCOUNTER (OUTPATIENT)
Age: 60
End: 2020-03-20

## 2020-03-20 VITALS
RESPIRATION RATE: 17 BRPM | SYSTOLIC BLOOD PRESSURE: 108 MMHG | TEMPERATURE: 98 F | HEART RATE: 85 BPM | DIASTOLIC BLOOD PRESSURE: 62 MMHG | OXYGEN SATURATION: 99 %

## 2020-03-20 LAB
ANION GAP SERPL CALC-SCNC: 12 MMOL/L — SIGNIFICANT CHANGE UP (ref 5–17)
BUN SERPL-MCNC: 18 MG/DL — SIGNIFICANT CHANGE UP (ref 7–23)
CALCIUM SERPL-MCNC: 8.9 MG/DL — SIGNIFICANT CHANGE UP (ref 8.4–10.5)
CHLORIDE SERPL-SCNC: 103 MMOL/L — SIGNIFICANT CHANGE UP (ref 96–108)
CO2 SERPL-SCNC: 22 MMOL/L — SIGNIFICANT CHANGE UP (ref 22–31)
CREAT SERPL-MCNC: 1.36 MG/DL — HIGH (ref 0.5–1.3)
GLUCOSE SERPL-MCNC: 100 MG/DL — HIGH (ref 70–99)
HCT VFR BLD CALC: 30.4 % — LOW (ref 39–50)
HGB BLD-MCNC: 9.3 G/DL — LOW (ref 13–17)
MAGNESIUM SERPL-MCNC: 1.8 MG/DL — SIGNIFICANT CHANGE UP (ref 1.6–2.6)
MCHC RBC-ENTMCNC: 30.6 GM/DL — LOW (ref 32–36)
MCHC RBC-ENTMCNC: 31.1 PG — SIGNIFICANT CHANGE UP (ref 27–34)
MCV RBC AUTO: 101.7 FL — HIGH (ref 80–100)
NRBC # BLD: 0 /100 WBCS — SIGNIFICANT CHANGE UP (ref 0–0)
PHOSPHATE SERPL-MCNC: 2.1 MG/DL — LOW (ref 2.5–4.5)
PLATELET # BLD AUTO: 224 K/UL — SIGNIFICANT CHANGE UP (ref 150–400)
POTASSIUM SERPL-MCNC: 4.1 MMOL/L — SIGNIFICANT CHANGE UP (ref 3.5–5.3)
POTASSIUM SERPL-SCNC: 4.1 MMOL/L — SIGNIFICANT CHANGE UP (ref 3.5–5.3)
RBC # BLD: 2.99 M/UL — LOW (ref 4.2–5.8)
RBC # FLD: 13.5 % — SIGNIFICANT CHANGE UP (ref 10.3–14.5)
SODIUM SERPL-SCNC: 137 MMOL/L — SIGNIFICANT CHANGE UP (ref 135–145)
WBC # BLD: 10.48 K/UL — SIGNIFICANT CHANGE UP (ref 3.8–10.5)
WBC # FLD AUTO: 10.48 K/UL — SIGNIFICANT CHANGE UP (ref 3.8–10.5)

## 2020-03-20 PROCEDURE — 84484 ASSAY OF TROPONIN QUANT: CPT

## 2020-03-20 PROCEDURE — 80053 COMPREHEN METABOLIC PANEL: CPT

## 2020-03-20 PROCEDURE — 85610 PROTHROMBIN TIME: CPT

## 2020-03-20 PROCEDURE — 75635 CT ANGIO ABDOMINAL ARTERIES: CPT

## 2020-03-20 PROCEDURE — 84100 ASSAY OF PHOSPHORUS: CPT

## 2020-03-20 PROCEDURE — C1894: CPT

## 2020-03-20 PROCEDURE — 96374 THER/PROPH/DIAG INJ IV PUSH: CPT

## 2020-03-20 PROCEDURE — C1889: CPT

## 2020-03-20 PROCEDURE — 84295 ASSAY OF SERUM SODIUM: CPT

## 2020-03-20 PROCEDURE — 82435 ASSAY OF BLOOD CHLORIDE: CPT

## 2020-03-20 PROCEDURE — 85730 THROMBOPLASTIN TIME PARTIAL: CPT

## 2020-03-20 PROCEDURE — 99233 SBSQ HOSP IP/OBS HIGH 50: CPT

## 2020-03-20 PROCEDURE — C1768: CPT

## 2020-03-20 PROCEDURE — 97161 PT EVAL LOW COMPLEX 20 MIN: CPT

## 2020-03-20 PROCEDURE — 83605 ASSAY OF LACTIC ACID: CPT

## 2020-03-20 PROCEDURE — C1769: CPT

## 2020-03-20 PROCEDURE — 86850 RBC ANTIBODY SCREEN: CPT

## 2020-03-20 PROCEDURE — 82803 BLOOD GASES ANY COMBINATION: CPT

## 2020-03-20 PROCEDURE — 85027 COMPLETE CBC AUTOMATED: CPT

## 2020-03-20 PROCEDURE — 93005 ELECTROCARDIOGRAM TRACING: CPT

## 2020-03-20 PROCEDURE — 83735 ASSAY OF MAGNESIUM: CPT

## 2020-03-20 PROCEDURE — 80048 BASIC METABOLIC PNL TOTAL CA: CPT

## 2020-03-20 PROCEDURE — 97116 GAIT TRAINING THERAPY: CPT

## 2020-03-20 PROCEDURE — 82565 ASSAY OF CREATININE: CPT

## 2020-03-20 PROCEDURE — 97530 THERAPEUTIC ACTIVITIES: CPT

## 2020-03-20 PROCEDURE — 96375 TX/PRO/DX INJ NEW DRUG ADDON: CPT

## 2020-03-20 PROCEDURE — 84132 ASSAY OF SERUM POTASSIUM: CPT

## 2020-03-20 PROCEDURE — 99285 EMERGENCY DEPT VISIT HI MDM: CPT | Mod: 25

## 2020-03-20 PROCEDURE — 82947 ASSAY GLUCOSE BLOOD QUANT: CPT

## 2020-03-20 PROCEDURE — 86901 BLOOD TYPING SEROLOGIC RH(D): CPT

## 2020-03-20 PROCEDURE — 82330 ASSAY OF CALCIUM: CPT

## 2020-03-20 PROCEDURE — 86900 BLOOD TYPING SEROLOGIC ABO: CPT

## 2020-03-20 PROCEDURE — 85014 HEMATOCRIT: CPT

## 2020-03-20 PROCEDURE — 86803 HEPATITIS C AB TEST: CPT

## 2020-03-20 RX ORDER — RIVAROXABAN 15 MG-20MG
1 KIT ORAL
Qty: 30 | Refills: 0
Start: 2020-03-20 | End: 2020-04-18

## 2020-03-20 RX ORDER — OXYCODONE AND ACETAMINOPHEN 5; 325 MG/1; MG/1
1 TABLET ORAL
Qty: 10 | Refills: 0
Start: 2020-03-20

## 2020-03-20 RX ORDER — POTASSIUM CHLORIDE 20 MEQ
20 PACKET (EA) ORAL
Refills: 0 | Status: COMPLETED | OUTPATIENT
Start: 2020-03-20 | End: 2020-03-20

## 2020-03-20 RX ORDER — CEPHALEXIN 500 MG
1 CAPSULE ORAL
Qty: 12 | Refills: 0
Start: 2020-03-20 | End: 2020-03-25

## 2020-03-20 RX ORDER — DULOXETINE HYDROCHLORIDE 30 MG/1
1 CAPSULE, DELAYED RELEASE ORAL
Qty: 30 | Refills: 0
Start: 2020-03-20 | End: 2020-04-18

## 2020-03-20 RX ORDER — CEPHALEXIN 500 MG
500 CAPSULE ORAL EVERY 12 HOURS
Refills: 0 | Status: DISCONTINUED | OUTPATIENT
Start: 2020-03-20 | End: 2020-03-20

## 2020-03-20 RX ORDER — SODIUM CHLORIDE 9 MG/ML
1000 INJECTION INTRAMUSCULAR; INTRAVENOUS; SUBCUTANEOUS ONCE
Refills: 0 | Status: COMPLETED | OUTPATIENT
Start: 2020-03-20 | End: 2020-03-20

## 2020-03-20 RX ADMIN — Medication 81 MILLIGRAM(S): at 11:35

## 2020-03-20 RX ADMIN — Medication 975 MILLIGRAM(S): at 11:34

## 2020-03-20 RX ADMIN — Medication 20 MILLIEQUIVALENT(S): at 05:13

## 2020-03-20 RX ADMIN — Medication 20 MILLIEQUIVALENT(S): at 08:45

## 2020-03-20 RX ADMIN — Medication 1 TABLET(S): at 11:35

## 2020-03-20 RX ADMIN — SODIUM CHLORIDE 1000 MILLILITER(S): 9 INJECTION INTRAMUSCULAR; INTRAVENOUS; SUBCUTANEOUS at 07:30

## 2020-03-20 RX ADMIN — OXYCODONE HYDROCHLORIDE 10 MILLIGRAM(S): 5 TABLET ORAL at 00:07

## 2020-03-20 RX ADMIN — DULOXETINE HYDROCHLORIDE 30 MILLIGRAM(S): 30 CAPSULE, DELAYED RELEASE ORAL at 11:35

## 2020-03-20 RX ADMIN — OXYCODONE HYDROCHLORIDE 10 MILLIGRAM(S): 5 TABLET ORAL at 06:45

## 2020-03-20 RX ADMIN — Medication 975 MILLIGRAM(S): at 05:09

## 2020-03-20 RX ADMIN — OXYCODONE HYDROCHLORIDE 10 MILLIGRAM(S): 5 TABLET ORAL at 00:38

## 2020-03-20 RX ADMIN — OXYCODONE HYDROCHLORIDE 10 MILLIGRAM(S): 5 TABLET ORAL at 06:15

## 2020-03-20 RX ADMIN — RIVAROXABAN 2.5 MILLIGRAM(S): KIT at 08:45

## 2020-03-20 NOTE — DISCHARGE NOTE NURSING/CASE MANAGEMENT/SOCIAL WORK - PATIENT PORTAL LINK FT
You can access the FollowMyHealth Patient Portal offered by [facility] by registering at the following website: [email or web address]. By joining [facility]’s FollowMyHealth portal, you will also be able to view your health information using other applications (apps) compatible with our system.

## 2020-03-20 NOTE — PROGRESS NOTE ADULT - ASSESSMENT
59M PMHx CAD s/p stent x2 to LAD with negative cath 2019 per chart review, HTN, HLD, COPD, PAD s/p R-->L fem-fem ('04) & left SFA stent ('19), former 1 ppd x30 year smoker now presenting to the ED c/o severe worsening LLE x2 days, with concern for acute on chronic arterial occlusion. POD1 sp R to L fem fem bypass w PTFE and L fem to above pop bypass w PTFE. Mild hypotensive on POD 1. Repeat labs negative for bleeding. EKG showed new onset trigemini, patient responded with fluid bolus, remains asymptomatic.     Plan:   - ASA  - Labs  - Holding antihypertensives for now  - f/u PT rec  - Appreciate Cards recommendation    Vascular Surgery   [id number] with any questions

## 2020-03-20 NOTE — DISCHARGE NOTE NURSING/CASE MANAGEMENT/SOCIAL WORK - NSDCPNINST_GEN_ALL_CORE
Please return to the hospital if you experience any of the following: fever, unusual/foul smelling drainagae, redness or swelling at the incisional site, pain that is uncontrolled w/pain medication, nausea/vomiting/unable to tolerate any diet. Pt. verbalized understanding of discharge instructions. Pt. alert and oriented x 4, ambulatory, voiding, tolerating diet, vss. Pt. verbalized understanding of medications prescribed and to follow up with MD in time ordered. IV lock removed and safety maintained.
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
## 2020-03-20 NOTE — PROGRESS NOTE ADULT - SUBJECTIVE AND OBJECTIVE BOX
Surgery Progress Note    S: Patient seen and examined. No acute events overnight. Reports tolerating diet without nausea, vomiting, passing flatus, having bowel movements, voiding without issues, have been ambulating and out of bed. Denies fever, chills, SOB, chest pain.     O:  Physical Exam:  Gen: NAD, resting in bed, alert and responding appropriately  Resp: Airway patent, non-labored respirations  Abd: Soft, ND, NTTP x 4 quadrants, no rebound or guarding.   : Shipley clear yellow urine  Groin: Dressings x2 cdi  Ext: No edema, WWP LLE: Thigh dressing spotting, dopplerable and PT and DP pulses RLE: dopplerable and PT    Neuro: AAOx3, no focal deficits    Vital Signs Last 24 Hrs  T(C): 36.6 (20 Mar 2020 05:03), Max: 37 (19 Mar 2020 09:31)  T(F): 97.9 (20 Mar 2020 05:03), Max: 98.6 (19 Mar 2020 09:31)  HR: 86 (20 Mar 2020 01:12) (74 - 91)  BP: 94/66 (20 Mar 2020 01:12) (94/66 - 125/78)  BP(mean): --  RR: 18 (20 Mar 2020 05:03) (18 - 18)  SpO2: 97% (20 Mar 2020 05:03) (96% - 99%)    I&O's Detail    18 Mar 2020 07:01  -  19 Mar 2020 07:00  --------------------------------------------------------  IN:    heparin Infusion: 147 mL    IV PiggyBack: 50 mL    lactated ringers.: 350 mL    Oral Fluid: 580 mL    phenylephrine   Infusion: 13.1 mL  Total IN: 1140.1 mL    OUT:    Indwelling Catheter - Urethral: 300 mL  Total OUT: 300 mL    Total NET: 840.1 mL      19 Mar 2020 07:01  -  20 Mar 2020 05:37  --------------------------------------------------------  IN:    Oral Fluid: 720 mL    Sodium Chloride 0.9% IV Bolus: 1000 mL  Total IN: 1720 mL    OUT:    Voided: 1250 mL  Total OUT: 1250 mL    Total NET: 470 mL                                9.7    11.37 )-----------( 231      ( 19 Mar 2020 20:23 )             30.7       03-19    134<L>  |  100  |  20  ----------------------------<  113<H>  3.6   |  20<L>  |  1.67<H>    Ca    8.8      19 Mar 2020 20:23  Phos  3.5     03-19  Mg     1.8     03-19

## 2020-03-20 NOTE — DISCHARGE NOTE NURSING/CASE MANAGEMENT/SOCIAL WORK - NSDCPNDISPN_GEN_ALL_CORE
Education provided on the pain management plan of care/Opioids not applicable/not prescribed/Side effects of pain management treatment/Activities of daily living, including home environment that might     exacerbate pain or reduce effectiveness of the pain management plan of care as well as strategies to address these issues/Safe use, storage and disposal of opioids when prescribed

## 2020-03-26 ENCOUNTER — APPOINTMENT (OUTPATIENT)
Dept: VASCULAR SURGERY | Facility: CLINIC | Age: 60
End: 2020-03-26
Payer: COMMERCIAL

## 2020-03-26 VITALS
SYSTOLIC BLOOD PRESSURE: 181 MMHG | TEMPERATURE: 97.5 F | HEIGHT: 75 IN | DIASTOLIC BLOOD PRESSURE: 118 MMHG | HEART RATE: 54 BPM | BODY MASS INDEX: 33.57 KG/M2 | WEIGHT: 270 LBS

## 2020-03-26 PROCEDURE — 93925 LOWER EXTREMITY STUDY: CPT

## 2020-03-26 PROCEDURE — 99024 POSTOP FOLLOW-UP VISIT: CPT

## 2020-03-26 NOTE — REASON FOR VISIT
[de-identified] : R-L fem-fem and L fem-pop with PTFE [de-identified] : 3/18/20 [de-identified] : Pt. is doing well. c/o L foot swelling and pain\par on exam all incisions are clean and dry\par bl feet are warm and well perfused\par duplex shows patent fem-pop and fem-fem, no stenosis\par A/P: doing well and bypass\par cont A/c\par will return in 3m to remove staples

## 2020-04-17 ENCOUNTER — APPOINTMENT (OUTPATIENT)
Dept: VASCULAR SURGERY | Facility: CLINIC | Age: 60
End: 2020-04-17
Payer: COMMERCIAL

## 2020-04-17 VITALS — DIASTOLIC BLOOD PRESSURE: 62 MMHG | SYSTOLIC BLOOD PRESSURE: 89 MMHG | HEART RATE: 71 BPM

## 2020-04-17 VITALS — WEIGHT: 270 LBS | TEMPERATURE: 97.5 F | HEIGHT: 75 IN | BODY MASS INDEX: 33.57 KG/M2

## 2020-04-17 PROCEDURE — 99024 POSTOP FOLLOW-UP VISIT: CPT

## 2020-04-17 NOTE — PHYSICAL EXAM
[Normal Breath Sounds] : Normal breath sounds [Normal Heart Sounds] : normal heart sounds [0] : left 0 [1+] : right 1+ [2+] : left 2+ [Ankle Swelling (On Exam)] : present [Ankle Swelling Bilaterally] : bilaterally  [Varicose Veins Of Lower Extremities] : not present [] : not present [Abdomen Masses] : No abdominal masses [No HSM] : no hepatosplenomegaly [Tender] : was nontender [Stool Sample Taken] : No stool obtained  on rectal exam [No Rash or Lesion] : No rash or lesion [Alert] : alert [Oriented to Person] : oriented to person [Oriented to Place] : oriented to place [Oriented to Time] : oriented to time [Calm] : calm [de-identified] : awake, alert, in discomfort [de-identified] : wnl [de-identified] : rosel [FreeTextEntry1] : palp R fem pulse.  well healed fem fem incisions, incision for ant exposure hip\par lle warm well perf w normal cap refill and perfusion \par rupesh mild to mod art insuff  w mod trophic skin changes \par no wounds/ulcers\par lle mild to mod reperf edema remains\par all incisions healing wel  [de-identified] : wnl [de-identified] : wnl  [de-identified] : Kwabena Cranial nerves 2-12 kwabena grossly intact [de-identified] : cooperative

## 2020-04-17 NOTE — ASSESSMENT
[FreeTextEntry1] : Impression symptomatic arterial insuff w lle rest pain w resolution  after  rt to lt fem fem byp redo and lle fem pop byp \par \par \par Plan Med Conservative management exercise program, protective measures, wouncare (betadine)\par d/w Dr AYSE Maguire  re pt's current meds\par advised pt and wife to d/c plavix\par continue xarelto 2.5mg bid\par restart baby asa daily\par ov in 2 mo to re eval le art insuff \par ov w  fem fem byp duplex s/o stenosos and lt fem pop byp duplex s/o stenosis 6mo seept 2020 \par rto if any c/o  [Arterial/Venous Disease] : arterial/venous disease [Medication Management] : medication management [Foot care/Footwear] : foot care/footwear

## 2020-04-17 NOTE — HISTORY OF PRESENT ILLNESS
[FreeTextEntry1] : 59M, w severe rest pain in the left foot.\par He underwent R-L fem fem 2004 and had been doing well.  last sept he had worsening pain in the foot, underwent angio and l sfa stent.  he did not have improvement, and progressively worsened.\par He now has had ~ 1 month severe rest pain in the left foot.  He dangles his foot to sleep.\par He takes oxycodone, which does help.  \par 2 days ago he dropped something on his toe and now has a blister on the toe.\par No fevers/chills\par \par + PCI stent x2\par + diverticulitis, s/p open sigmoid colectomy\par + CEA\par \par prior smoker, recently quit\par \par He is taking antiplatelet, he is on statin [de-identified] : pt is s/p rt to left fem fem  redo byp and lle fem pop for lle rest pain\par pt is on plavix and xarelto 2.5 qd which was recently changed to bid dosing \par pt denies le i claudication or rest pain

## 2020-06-12 ENCOUNTER — APPOINTMENT (OUTPATIENT)
Dept: VASCULAR SURGERY | Facility: CLINIC | Age: 60
End: 2020-06-12
Payer: COMMERCIAL

## 2020-06-12 VITALS
SYSTOLIC BLOOD PRESSURE: 106 MMHG | HEIGHT: 75 IN | BODY MASS INDEX: 33.57 KG/M2 | TEMPERATURE: 98.7 F | DIASTOLIC BLOOD PRESSURE: 71 MMHG | HEART RATE: 77 BPM | WEIGHT: 270 LBS

## 2020-06-12 PROCEDURE — 99024 POSTOP FOLLOW-UP VISIT: CPT

## 2020-06-12 PROCEDURE — 93926 LOWER EXTREMITY STUDY: CPT
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
## 2020-06-12 NOTE — ASSESSMENT
[FreeTextEntry1] : patent bypasses.\par He has claudication on the R, rec conservative management, he has quit smoking, cont asa, statin therapy\par He can be on ASA monotherapy from a vascular standpoint\par He has hx of CEA, plan duplex for surveillance\par fu 6 months for bypass surveillance\par \par I advised him to follow up with his pcp re potentially adjusting antihypertensive medication

## 2020-06-12 NOTE — PHYSICAL EXAM
[Normal Breath Sounds] : Normal breath sounds [Normal Heart Sounds] : normal heart sounds [0] : left 0 [2+] : right 2+ [de-identified] : awake, alert, in discomfort [FreeTextEntry1] : Well healed fem fem, and pop incisions\par no overlying cellulitis no tenderness\par good cap refill l foot, slightly delayed on right [de-identified] : L CEA incision

## 2020-06-17 ENCOUNTER — APPOINTMENT (OUTPATIENT)
Dept: VASCULAR SURGERY | Facility: CLINIC | Age: 60
End: 2020-06-17

## 2020-09-14 ENCOUNTER — APPOINTMENT (OUTPATIENT)
Dept: CARDIOLOGY | Facility: CLINIC | Age: 60
End: 2020-09-14

## 2020-09-21 ENCOUNTER — APPOINTMENT (OUTPATIENT)
Dept: CARDIOLOGY | Facility: CLINIC | Age: 60
End: 2020-09-21
Payer: COMMERCIAL

## 2020-09-21 ENCOUNTER — NON-APPOINTMENT (OUTPATIENT)
Age: 60
End: 2020-09-21

## 2020-09-21 VITALS
WEIGHT: 221 LBS | SYSTOLIC BLOOD PRESSURE: 86 MMHG | HEIGHT: 75 IN | OXYGEN SATURATION: 100 % | HEART RATE: 51 BPM | BODY MASS INDEX: 27.48 KG/M2 | DIASTOLIC BLOOD PRESSURE: 54 MMHG

## 2020-09-21 PROCEDURE — 93000 ELECTROCARDIOGRAM COMPLETE: CPT

## 2020-09-21 PROCEDURE — 99214 OFFICE O/P EST MOD 30 MIN: CPT

## 2020-09-21 RX ORDER — OXYCODONE AND ACETAMINOPHEN 5; 325 MG/1; MG/1
5-325 TABLET ORAL EVERY 8 HOURS
Qty: 15 | Refills: 0 | Status: DISCONTINUED | COMMUNITY
Start: 2019-10-01 | End: 2020-09-21

## 2020-09-21 RX ORDER — DICLOFENAC SODIUM 75 MG/1
75 TABLET, DELAYED RELEASE ORAL
Qty: 30 | Refills: 2 | Status: DISCONTINUED | COMMUNITY
Start: 2019-09-27 | End: 2020-09-21

## 2020-09-21 NOTE — HISTORY OF PRESENT ILLNESS
[FreeTextEntry1] : Patient with CAD and severe PVD.  S/P recent repeat fem-fem bypass.  He has no chest pain or dyspnea.  He needs clearance for epidural injections for back pain.

## 2020-09-21 NOTE — DISCUSSION/SUMMARY
[FreeTextEntry1] : Patient has no ischemic symptoms.  He is stable to undergo epidural injections.  Plavix may be held.  Repeat lipids ordered.  HTN med reduced due to low BP.

## 2020-10-19 ENCOUNTER — APPOINTMENT (OUTPATIENT)
Dept: CARDIOLOGY | Facility: CLINIC | Age: 60
End: 2020-10-19
Payer: COMMERCIAL

## 2020-10-19 ENCOUNTER — NON-APPOINTMENT (OUTPATIENT)
Age: 60
End: 2020-10-19

## 2020-10-19 VITALS
WEIGHT: 223 LBS | OXYGEN SATURATION: 100 % | BODY MASS INDEX: 27.73 KG/M2 | DIASTOLIC BLOOD PRESSURE: 69 MMHG | HEART RATE: 69 BPM | HEIGHT: 75 IN | SYSTOLIC BLOOD PRESSURE: 104 MMHG

## 2020-10-19 PROCEDURE — 99214 OFFICE O/P EST MOD 30 MIN: CPT

## 2020-10-19 PROCEDURE — 99072 ADDL SUPL MATRL&STAF TM PHE: CPT

## 2020-10-19 PROCEDURE — 93000 ELECTROCARDIOGRAM COMPLETE: CPT

## 2020-10-20 ENCOUNTER — OUTPATIENT (OUTPATIENT)
Dept: OUTPATIENT SERVICES | Facility: HOSPITAL | Age: 60
LOS: 1 days | End: 2020-10-20
Payer: COMMERCIAL

## 2020-10-20 VITALS
HEIGHT: 75 IN | SYSTOLIC BLOOD PRESSURE: 118 MMHG | WEIGHT: 223.11 LBS | TEMPERATURE: 98 F | OXYGEN SATURATION: 96 % | RESPIRATION RATE: 16 BRPM | HEART RATE: 51 BPM | DIASTOLIC BLOOD PRESSURE: 80 MMHG

## 2020-10-20 DIAGNOSIS — Z90.49 ACQUIRED ABSENCE OF OTHER SPECIFIED PARTS OF DIGESTIVE TRACT: Chronic | ICD-10-CM

## 2020-10-20 DIAGNOSIS — Z98.89 OTHER SPECIFIED POSTPROCEDURAL STATES: Chronic | ICD-10-CM

## 2020-10-20 DIAGNOSIS — I65.21 OCCLUSION AND STENOSIS OF RIGHT CAROTID ARTERY: ICD-10-CM

## 2020-10-20 DIAGNOSIS — Z86.79 PERSONAL HISTORY OF OTHER DISEASES OF THE CIRCULATORY SYSTEM: ICD-10-CM

## 2020-10-20 DIAGNOSIS — Z01.818 ENCOUNTER FOR OTHER PREPROCEDURAL EXAMINATION: ICD-10-CM

## 2020-10-20 DIAGNOSIS — Z98.890 OTHER SPECIFIED POSTPROCEDURAL STATES: Chronic | ICD-10-CM

## 2020-10-20 DIAGNOSIS — Z98.61 CORONARY ANGIOPLASTY STATUS: Chronic | ICD-10-CM

## 2020-10-20 DIAGNOSIS — Z98.62 PERIPHERAL VASCULAR ANGIOPLASTY STATUS: Chronic | ICD-10-CM

## 2020-10-20 DIAGNOSIS — Z29.9 ENCOUNTER FOR PROPHYLACTIC MEASURES, UNSPECIFIED: ICD-10-CM

## 2020-10-20 DIAGNOSIS — Z95.828 PRESENCE OF OTHER VASCULAR IMPLANTS AND GRAFTS: Chronic | ICD-10-CM

## 2020-10-20 DIAGNOSIS — Z90.89 ACQUIRED ABSENCE OF OTHER ORGANS: Chronic | ICD-10-CM

## 2020-10-20 DIAGNOSIS — Z96.60 PRESENCE OF UNSPECIFIED ORTHOPEDIC JOINT IMPLANT: Chronic | ICD-10-CM

## 2020-10-20 DIAGNOSIS — Z11.59 ENCOUNTER FOR SCREENING FOR OTHER VIRAL DISEASES: ICD-10-CM

## 2020-10-20 LAB
ANION GAP SERPL CALC-SCNC: 12 MMOL/L — SIGNIFICANT CHANGE UP (ref 5–17)
BLD GP AB SCN SERPL QL: NEGATIVE — SIGNIFICANT CHANGE UP
BUN SERPL-MCNC: 10 MG/DL — SIGNIFICANT CHANGE UP (ref 7–23)
CALCIUM SERPL-MCNC: 10.4 MG/DL — SIGNIFICANT CHANGE UP (ref 8.4–10.5)
CHLORIDE SERPL-SCNC: 101 MMOL/L — SIGNIFICANT CHANGE UP (ref 96–108)
CO2 SERPL-SCNC: 25 MMOL/L — SIGNIFICANT CHANGE UP (ref 22–31)
CREAT SERPL-MCNC: 0.97 MG/DL — SIGNIFICANT CHANGE UP (ref 0.5–1.3)
GLUCOSE SERPL-MCNC: 104 MG/DL — HIGH (ref 70–99)
HCT VFR BLD CALC: 40.3 % — SIGNIFICANT CHANGE UP (ref 39–50)
HGB BLD-MCNC: 12.6 G/DL — LOW (ref 13–17)
MCHC RBC-ENTMCNC: 27.4 PG — SIGNIFICANT CHANGE UP (ref 27–34)
MCHC RBC-ENTMCNC: 31.3 GM/DL — LOW (ref 32–36)
MCV RBC AUTO: 87.6 FL — SIGNIFICANT CHANGE UP (ref 80–100)
NRBC # BLD: 0 /100 WBCS — SIGNIFICANT CHANGE UP (ref 0–0)
PLATELET # BLD AUTO: 321 K/UL — SIGNIFICANT CHANGE UP (ref 150–400)
POTASSIUM SERPL-MCNC: 4.5 MMOL/L — SIGNIFICANT CHANGE UP (ref 3.5–5.3)
POTASSIUM SERPL-SCNC: 4.5 MMOL/L — SIGNIFICANT CHANGE UP (ref 3.5–5.3)
RBC # BLD: 4.6 M/UL — SIGNIFICANT CHANGE UP (ref 4.2–5.8)
RBC # FLD: 15.2 % — HIGH (ref 10.3–14.5)
RH IG SCN BLD-IMP: POSITIVE — SIGNIFICANT CHANGE UP
SARS-COV-2 RNA SPEC QL NAA+PROBE: SIGNIFICANT CHANGE UP
SODIUM SERPL-SCNC: 138 MMOL/L — SIGNIFICANT CHANGE UP (ref 135–145)
WBC # BLD: 11.95 K/UL — HIGH (ref 3.8–10.5)
WBC # FLD AUTO: 11.95 K/UL — HIGH (ref 3.8–10.5)

## 2020-10-20 PROCEDURE — U0003: CPT

## 2020-10-20 PROCEDURE — 86900 BLOOD TYPING SEROLOGIC ABO: CPT

## 2020-10-20 PROCEDURE — G0463: CPT

## 2020-10-20 PROCEDURE — 86901 BLOOD TYPING SEROLOGIC RH(D): CPT

## 2020-10-20 PROCEDURE — 80048 BASIC METABOLIC PNL TOTAL CA: CPT

## 2020-10-20 PROCEDURE — 86850 RBC ANTIBODY SCREEN: CPT

## 2020-10-20 PROCEDURE — 85027 COMPLETE CBC AUTOMATED: CPT

## 2020-10-20 RX ORDER — CEFAZOLIN SODIUM 1 G
2000 VIAL (EA) INJECTION ONCE
Refills: 0 | Status: COMPLETED | OUTPATIENT
Start: 2020-10-28 | End: 2020-10-28

## 2020-10-20 RX ORDER — CALCIUM POLYCARBOPHIL 625 MG/1
1 TABLET, FILM COATED ORAL
Qty: 0 | Refills: 0 | DISCHARGE

## 2020-10-20 NOTE — PHYSICAL EXAM
[General Appearance - Well Developed] : well developed [Normal Appearance] : normal appearance [General Appearance - Well Nourished] : well nourished [Well Groomed] : well groomed [General Appearance - In No Acute Distress] : no acute distress [No Deformities] : no deformities [Normal Conjunctiva] : the conjunctiva exhibited no abnormalities [Eyelids - No Xanthelasma] : the eyelids demonstrated no xanthelasmas [Normal Oral Mucosa] : normal oral mucosa [No Oral Cyanosis] : no oral cyanosis [No Oral Pallor] : no oral pallor [Normal Jugular Venous A Waves Present] : normal jugular venous A waves present [Normal Jugular Venous V Waves Present] : normal jugular venous V waves present [No Jugular Venous Richey A Waves] : no jugular venous richey A waves [Respiration, Rhythm And Depth] : normal respiratory rhythm and effort [Exaggerated Use Of Accessory Muscles For Inspiration] : no accessory muscle use [Heart Rate And Rhythm] : heart rate and rhythm were normal [Auscultation Breath Sounds / Voice Sounds] : lungs were clear to auscultation bilaterally [Heart Sounds] : normal S1 and S2 [Abdomen Soft] : soft [Murmurs] : no murmurs present [Abdomen Tenderness] : non-tender [Abdomen Mass (___ Cm)] : no abdominal mass palpated [Abnormal Walk] : normal gait [Gait - Sufficient For Exercise Testing] : the gait was sufficient for exercise testing [Cyanosis, Localized] : no localized cyanosis [Nail Clubbing] : no clubbing of the fingernails [Petechial Hemorrhages (___cm)] : no petechial hemorrhages [Skin Color & Pigmentation] : normal skin color and pigmentation [No Venous Stasis] : no venous stasis [] : no rash [Skin Lesions] : no skin lesions [No Skin Ulcers] : no skin ulcer [No Xanthoma] : no  xanthoma was observed [Oriented To Time, Place, And Person] : oriented to person, place, and time [Affect] : the affect was normal [Mood] : the mood was normal [No Anxiety] : not feeling anxious

## 2020-10-20 NOTE — H&P PST ADULT - NSICDXPROBLEM_GEN_ALL_CORE_FT
PROBLEM DIAGNOSES  Problem: History of carotid artery stenosis  Assessment and Plan: Right Carotid Endarterectomy  pre-operative cardiac cath planned 10/22 with Dr. Prieto  as per patient, he was instructed by Dr. Madrigal to hold Plavix for 7 day prior to procedure    Problem: Need for prophylactic measure  Assessment and Plan: The Caprini score indicates this patient is at risk for a VTE event (score 3-5).  Most surgical patients in this group would benefit from pharmacologic prophylaxis.  The surgical team will determine the balance between VTE risk and bleeding risk

## 2020-10-20 NOTE — H&P PST ADULT - HISTORY OF PRESENT ILLNESS
60 YEAR OLD MALE 911  WITH PMH OF CURRENT TOBACCO USE, CAD, PCI CORONARY STENTS X 2 1995, 2014, PAD, S/P FEM-FEM BYPASS X 2 (MOST RECENT MARCH 2020), CAROTID ARTERY DISEASE S/P LEFT CAROTID ENDARTERECTOMY FEB 2019, HTN, HLD FOUND TO HAVE 90% OCCLUSION OF RIGHT CAROTID ARTERY. HE NOW PRESENTS TO PST FOR SCREENING PRIOR TO RIGHT CAROTID ENDARTERECTOMY.      SCHEDULED FOR CARDIAC CATH ON THURSDAY 10/22 WITH DR. DUSTIN LAGUNAS  LAST DOSE OF PLAVIX WILL BE 10/21 AS PER PATIENT'S DISCUSSION WITH DR. JADE SELF TESTING SCHEDULED FOR 10/26 AT [facility]

## 2020-10-20 NOTE — H&P PST ADULT - ASSESSMENT
KEAGANI VTE 2.0 SCORE [CLOT updated 2019]    AGE RELATED RISK FACTORS                                                       MOBILITY RELATED FACTORS  [x] Age 41-60 years                                            (1 Point)                    [ ] Bed rest                                                        (1 Point)  [ ] Age: 61-74 years                                           (2 Points)                  [ ] Plaster cast                                                   (2 Points)  [ ] Age= 75 years                                              (3 Points)                    [ ] Bed bound for more than 72 hours                 (2 Points)    DISEASE RELATED RISK FACTORS                                               GENDER SPECIFIC FACTORS  [ ] Edema in the lower extremities                       (1 Point)              [ ] Pregnancy                                                     (1 Point)  [ ] Varicose veins                                               (1 Point)                     [ ] Post-partum < 6 weeks                                   (1 Point)             [x] BMI > 25 Kg/m2                                            (1 Point)                     [ ] Hormonal therapy  or oral contraception          (1 Point)                 [ ] Sepsis (in the previous month)                        (1 Point)               [ ] History of pregnancy complications                 (1 point)  [ ] Pneumonia or serious lung disease                                               [ ] Unexplained or recurrent                     (1 Point)           (in the previous month)                               (1 Point)  [ ] Abnormal pulmonary function test                     (1 Point)                 SURGERY RELATED RISK FACTORS  [ ] Acute myocardial infarction                              (1 Point)               [ ]  Section                                             (1 Point)  [ ] Congestive heart failure (in the previous month)  (1 Point)      [ ] Minor surgery                                                  (1 Point)   [ ] Inflammatory bowel disease                             (1 Point)               [ ] Arthroscopic surgery                                        (2 Points)  [ ] Central venous access                                      (2 Points)                [x] General surgery lasting more than 45 minutes (2 points)  [ ] Malignancy- Present or previous                   (2 Points)                [ ] Elective arthroplasty                                         (5 points)    [ ] Stroke (in the previous month)                          (5 Points)                                                                                                                                                           HEMATOLOGY RELATED FACTORS                                                 TRAUMA RELATED RISK FACTORS  [ ] Prior episodes of VTE                                     (3 Points)                [ ] Fracture of the hip, pelvis, or leg                       (5 Points)  [ ] Positive family history for VTE                         (3 Points)             [ ] Acute spinal cord injury (in the previous month)  (5 Points)  [ ] Prothrombin 64857 A                                     (3 Points)               [ ] Paralysis  (less than 1 month)                             (5 Points)  [ ] Factor V Leiden                                             (3 Points)                  [ ] Multiple Trauma within 1 month                        (5 Points)  [ ] Lupus anticoagulants                                     (3 Points)                                                           [ ] Anticardiolipin antibodies                               (3 Points)                                                       [ ] High homocysteine in the blood                      (3 Points)                                             [ ] Other congenital or acquired thrombophilia      (3 Points)                                                [ ] Heparin induced thrombocytopenia                  (3 Points)                                     Total Score [ 4]

## 2020-10-20 NOTE — H&P PST ADULT - NSICDXPASTSURGICALHX_GEN_ALL_CORE_FT
PAST SURGICAL HISTORY:  H/O angioplasty right iliac vein stenting 3/2019    H/O carotid endarterectomy left 2/2019    H/O coronary angioplasty stent 1995 2013    History of colon resection 2007 for diverticulitis    S/P bypass graft of extremity fem to fem  2004, 3/2020    S/P hip replacement left   2012    S/P tonsillectomy childhood

## 2020-10-20 NOTE — H&P PST ADULT - ATTENDING COMMENTS
We plan right CEA for high grade stenosis.  All risks and alternatives were reiterated to the Losses who agree with this plan.
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
## 2020-10-22 NOTE — HISTORY OF PRESENT ILLNESS
[FreeTextEntry1] : Patient is a 60 year old male with CAD and increasingly severe PVD.  He is s/p coronary stent and recent revision of a failed fem-fem bypass.  he is now pre op for right CEA.  he is very sedentary, with no complaints of chest discomfort or dyspnea.

## 2020-10-22 NOTE — DISCUSSION/SUMMARY
[FreeTextEntry1] : Most common cardiac complication of CEA is MI.  Patient will need to have coronary anatomy assessed pre op.  Discussed with Dr. Madrigal, his vascular surgeon and will proceed with cardiac cath if possible, noninvasive ischemic assessment if not.

## 2020-10-23 ENCOUNTER — NON-APPOINTMENT (OUTPATIENT)
Age: 60
End: 2020-10-23

## 2020-10-25 ENCOUNTER — OUTPATIENT (OUTPATIENT)
Dept: OUTPATIENT SERVICES | Facility: HOSPITAL | Age: 60
LOS: 1 days | End: 2020-10-25
Payer: COMMERCIAL

## 2020-10-25 DIAGNOSIS — Z98.62 PERIPHERAL VASCULAR ANGIOPLASTY STATUS: Chronic | ICD-10-CM

## 2020-10-25 DIAGNOSIS — Z96.60 PRESENCE OF UNSPECIFIED ORTHOPEDIC JOINT IMPLANT: Chronic | ICD-10-CM

## 2020-10-25 DIAGNOSIS — Z11.59 ENCOUNTER FOR SCREENING FOR OTHER VIRAL DISEASES: ICD-10-CM

## 2020-10-25 DIAGNOSIS — Z90.89 ACQUIRED ABSENCE OF OTHER ORGANS: Chronic | ICD-10-CM

## 2020-10-25 DIAGNOSIS — Z98.61 CORONARY ANGIOPLASTY STATUS: Chronic | ICD-10-CM

## 2020-10-25 DIAGNOSIS — Z98.89 OTHER SPECIFIED POSTPROCEDURAL STATES: Chronic | ICD-10-CM

## 2020-10-25 DIAGNOSIS — Z98.890 OTHER SPECIFIED POSTPROCEDURAL STATES: Chronic | ICD-10-CM

## 2020-10-25 DIAGNOSIS — Z90.49 ACQUIRED ABSENCE OF OTHER SPECIFIED PARTS OF DIGESTIVE TRACT: Chronic | ICD-10-CM

## 2020-10-25 LAB — SARS-COV-2 RNA SPEC QL NAA+PROBE: SIGNIFICANT CHANGE UP

## 2020-10-25 PROCEDURE — U0003: CPT

## 2020-10-27 ENCOUNTER — NON-APPOINTMENT (OUTPATIENT)
Age: 60
End: 2020-10-27

## 2020-10-27 ENCOUNTER — RESULT REVIEW (OUTPATIENT)
Age: 60
End: 2020-10-27

## 2020-10-27 ENCOUNTER — OUTPATIENT (OUTPATIENT)
Dept: OUTPATIENT SERVICES | Facility: HOSPITAL | Age: 60
LOS: 1 days | End: 2020-10-27
Payer: COMMERCIAL

## 2020-10-27 ENCOUNTER — TRANSCRIPTION ENCOUNTER (OUTPATIENT)
Age: 60
End: 2020-10-27

## 2020-10-27 DIAGNOSIS — Z98.62 PERIPHERAL VASCULAR ANGIOPLASTY STATUS: Chronic | ICD-10-CM

## 2020-10-27 DIAGNOSIS — I77.1 STRICTURE OF ARTERY: ICD-10-CM

## 2020-10-27 DIAGNOSIS — Z98.89 OTHER SPECIFIED POSTPROCEDURAL STATES: Chronic | ICD-10-CM

## 2020-10-27 DIAGNOSIS — Z98.61 CORONARY ANGIOPLASTY STATUS: Chronic | ICD-10-CM

## 2020-10-27 DIAGNOSIS — Z96.60 PRESENCE OF UNSPECIFIED ORTHOPEDIC JOINT IMPLANT: Chronic | ICD-10-CM

## 2020-10-27 DIAGNOSIS — I25.10 ATHEROSCLEROTIC HEART DISEASE OF NATIVE CORONARY ARTERY WITHOUT ANGINA PECTORIS: ICD-10-CM

## 2020-10-27 DIAGNOSIS — Z90.89 ACQUIRED ABSENCE OF OTHER ORGANS: Chronic | ICD-10-CM

## 2020-10-27 DIAGNOSIS — Z98.890 OTHER SPECIFIED POSTPROCEDURAL STATES: Chronic | ICD-10-CM

## 2020-10-27 DIAGNOSIS — Z90.49 ACQUIRED ABSENCE OF OTHER SPECIFIED PARTS OF DIGESTIVE TRACT: Chronic | ICD-10-CM

## 2020-10-27 PROCEDURE — 78452 HT MUSCLE IMAGE SPECT MULT: CPT

## 2020-10-27 PROCEDURE — 93018 CV STRESS TEST I&R ONLY: CPT

## 2020-10-27 PROCEDURE — 78452 HT MUSCLE IMAGE SPECT MULT: CPT | Mod: 26

## 2020-10-27 PROCEDURE — A9500: CPT

## 2020-10-27 PROCEDURE — 93017 CV STRESS TEST TRACING ONLY: CPT

## 2020-10-27 PROCEDURE — 93016 CV STRESS TEST SUPVJ ONLY: CPT

## 2020-10-27 RX ORDER — REGADENOSON 0.08 MG/ML
0.4 INJECTION, SOLUTION INTRAVENOUS ONCE
Refills: 0 | Status: COMPLETED | OUTPATIENT
Start: 2020-10-27 | End: 2020-10-27

## 2020-10-27 RX ADMIN — REGADENOSON 0.4 MILLIGRAM(S): 0.08 INJECTION, SOLUTION INTRAVENOUS at 11:00

## 2020-10-28 ENCOUNTER — TRANSCRIPTION ENCOUNTER (OUTPATIENT)
Age: 60
End: 2020-10-28

## 2020-10-28 ENCOUNTER — RESULT REVIEW (OUTPATIENT)
Age: 60
End: 2020-10-28

## 2020-10-28 ENCOUNTER — INPATIENT (INPATIENT)
Facility: HOSPITAL | Age: 60
LOS: 0 days | Discharge: HOME | DRG: 39 | End: 2020-10-29
Attending: SURGERY | Admitting: SURGERY
Payer: COMMERCIAL

## 2020-10-28 VITALS
OXYGEN SATURATION: 98 % | RESPIRATION RATE: 16 BRPM | HEIGHT: 75 IN | HEART RATE: 69 BPM | DIASTOLIC BLOOD PRESSURE: 87 MMHG | TEMPERATURE: 98 F | WEIGHT: 223.11 LBS | SYSTOLIC BLOOD PRESSURE: 132 MMHG

## 2020-10-28 DIAGNOSIS — Z90.89 ACQUIRED ABSENCE OF OTHER ORGANS: Chronic | ICD-10-CM

## 2020-10-28 DIAGNOSIS — I77.1 STRICTURE OF ARTERY: ICD-10-CM

## 2020-10-28 DIAGNOSIS — I65.21 OCCLUSION AND STENOSIS OF RIGHT CAROTID ARTERY: ICD-10-CM

## 2020-10-28 DIAGNOSIS — Z96.60 PRESENCE OF UNSPECIFIED ORTHOPEDIC JOINT IMPLANT: Chronic | ICD-10-CM

## 2020-10-28 DIAGNOSIS — Z98.61 CORONARY ANGIOPLASTY STATUS: Chronic | ICD-10-CM

## 2020-10-28 DIAGNOSIS — I25.10 ATHEROSCLEROTIC HEART DISEASE OF NATIVE CORONARY ARTERY WITHOUT ANGINA PECTORIS: ICD-10-CM

## 2020-10-28 DIAGNOSIS — Z98.62 PERIPHERAL VASCULAR ANGIOPLASTY STATUS: Chronic | ICD-10-CM

## 2020-10-28 DIAGNOSIS — Z90.49 ACQUIRED ABSENCE OF OTHER SPECIFIED PARTS OF DIGESTIVE TRACT: Chronic | ICD-10-CM

## 2020-10-28 DIAGNOSIS — Z98.89 OTHER SPECIFIED POSTPROCEDURAL STATES: Chronic | ICD-10-CM

## 2020-10-28 DIAGNOSIS — Z98.890 OTHER SPECIFIED POSTPROCEDURAL STATES: Chronic | ICD-10-CM

## 2020-10-28 LAB
ANION GAP SERPL CALC-SCNC: 13 MMOL/L — SIGNIFICANT CHANGE UP (ref 5–17)
APTT BLD: 29.5 SEC — SIGNIFICANT CHANGE UP (ref 27.5–35.5)
BUN SERPL-MCNC: 9 MG/DL — SIGNIFICANT CHANGE UP (ref 7–23)
CALCIUM SERPL-MCNC: 9.7 MG/DL — SIGNIFICANT CHANGE UP (ref 8.4–10.5)
CHLORIDE SERPL-SCNC: 104 MMOL/L — SIGNIFICANT CHANGE UP (ref 96–108)
CK MB CFR SERPL CALC: 1 NG/ML — SIGNIFICANT CHANGE UP (ref 0–6.7)
CK SERPL-CCNC: 26 U/L — LOW (ref 30–200)
CO2 SERPL-SCNC: 21 MMOL/L — LOW (ref 22–31)
CREAT SERPL-MCNC: 0.96 MG/DL — SIGNIFICANT CHANGE UP (ref 0.5–1.3)
GLUCOSE SERPL-MCNC: 130 MG/DL — HIGH (ref 70–99)
HCT VFR BLD CALC: 40.9 % — SIGNIFICANT CHANGE UP (ref 39–50)
HGB BLD-MCNC: 13.2 G/DL — SIGNIFICANT CHANGE UP (ref 13–17)
INR BLD: 1.1 RATIO — SIGNIFICANT CHANGE UP (ref 0.88–1.16)
MCHC RBC-ENTMCNC: 27.8 PG — SIGNIFICANT CHANGE UP (ref 27–34)
MCHC RBC-ENTMCNC: 32.3 GM/DL — SIGNIFICANT CHANGE UP (ref 32–36)
MCV RBC AUTO: 86.1 FL — SIGNIFICANT CHANGE UP (ref 80–100)
NRBC # BLD: 0 /100 WBCS — SIGNIFICANT CHANGE UP (ref 0–0)
PLATELET # BLD AUTO: 295 K/UL — SIGNIFICANT CHANGE UP (ref 150–400)
POTASSIUM SERPL-MCNC: 4.4 MMOL/L — SIGNIFICANT CHANGE UP (ref 3.5–5.3)
POTASSIUM SERPL-SCNC: 4.4 MMOL/L — SIGNIFICANT CHANGE UP (ref 3.5–5.3)
PROTHROM AB SERPL-ACNC: 13.1 SEC — SIGNIFICANT CHANGE UP (ref 10.6–13.6)
RBC # BLD: 4.75 M/UL — SIGNIFICANT CHANGE UP (ref 4.2–5.8)
RBC # FLD: 15.9 % — HIGH (ref 10.3–14.5)
SODIUM SERPL-SCNC: 138 MMOL/L — SIGNIFICANT CHANGE UP (ref 135–145)
TROPONIN T, HIGH SENSITIVITY RESULT: 7 NG/L — SIGNIFICANT CHANGE UP (ref 0–51)
WBC # BLD: 11.65 K/UL — HIGH (ref 3.8–10.5)
WBC # FLD AUTO: 11.65 K/UL — HIGH (ref 3.8–10.5)

## 2020-10-28 PROCEDURE — 88311 DECALCIFY TISSUE: CPT | Mod: 26

## 2020-10-28 PROCEDURE — 71045 X-RAY EXAM CHEST 1 VIEW: CPT | Mod: 26

## 2020-10-28 PROCEDURE — 93010 ELECTROCARDIOGRAM REPORT: CPT

## 2020-10-28 PROCEDURE — 88304 TISSUE EXAM BY PATHOLOGIST: CPT | Mod: 26

## 2020-10-28 RX ORDER — HEPARIN SODIUM 5000 [USP'U]/ML
5000 INJECTION INTRAVENOUS; SUBCUTANEOUS EVERY 8 HOURS
Refills: 0 | Status: DISCONTINUED | OUTPATIENT
Start: 2020-10-28 | End: 2020-10-29

## 2020-10-28 RX ORDER — ONDANSETRON 8 MG/1
4 TABLET, FILM COATED ORAL ONCE
Refills: 0 | Status: DISCONTINUED | OUTPATIENT
Start: 2020-10-28 | End: 2020-10-28

## 2020-10-28 RX ORDER — ACETAMINOPHEN 500 MG
1000 TABLET ORAL ONCE
Refills: 0 | Status: COMPLETED | OUTPATIENT
Start: 2020-10-28 | End: 2020-10-28

## 2020-10-28 RX ORDER — LISINOPRIL 2.5 MG/1
40 TABLET ORAL DAILY
Refills: 0 | Status: DISCONTINUED | OUTPATIENT
Start: 2020-10-28 | End: 2020-10-29

## 2020-10-28 RX ORDER — FENOFIBRIC ACID 105 MG/1
1 TABLET ORAL
Qty: 0 | Refills: 0 | DISCHARGE

## 2020-10-28 RX ORDER — ACETAMINOPHEN 500 MG
975 TABLET ORAL EVERY 6 HOURS
Refills: 0 | Status: DISCONTINUED | OUTPATIENT
Start: 2020-10-28 | End: 2020-10-29

## 2020-10-28 RX ORDER — OXYCODONE HYDROCHLORIDE 5 MG/1
10 TABLET ORAL EVERY 4 HOURS
Refills: 0 | Status: DISCONTINUED | OUTPATIENT
Start: 2020-10-28 | End: 2020-10-29

## 2020-10-28 RX ORDER — HYDROMORPHONE HYDROCHLORIDE 2 MG/ML
0.5 INJECTION INTRAMUSCULAR; INTRAVENOUS; SUBCUTANEOUS
Refills: 0 | Status: DISCONTINUED | OUTPATIENT
Start: 2020-10-28 | End: 2020-10-28

## 2020-10-28 RX ORDER — FENTANYL CITRATE 50 UG/ML
50 INJECTION INTRAVENOUS ONCE
Refills: 0 | Status: DISCONTINUED | OUTPATIENT
Start: 2020-10-28 | End: 2020-10-28

## 2020-10-28 RX ORDER — ASPIRIN/CALCIUM CARB/MAGNESIUM 324 MG
0 TABLET ORAL
Qty: 0 | Refills: 0 | DISCHARGE

## 2020-10-28 RX ORDER — DEXTROSE MONOHYDRATE, SODIUM CHLORIDE, AND POTASSIUM CHLORIDE 50; .745; 4.5 G/1000ML; G/1000ML; G/1000ML
1000 INJECTION, SOLUTION INTRAVENOUS
Refills: 0 | Status: DISCONTINUED | OUTPATIENT
Start: 2020-10-28 | End: 2020-10-29

## 2020-10-28 RX ORDER — ALPRAZOLAM 0.25 MG
0.5 TABLET ORAL
Refills: 0 | Status: DISCONTINUED | OUTPATIENT
Start: 2020-10-28 | End: 2020-10-29

## 2020-10-28 RX ORDER — CLOPIDOGREL BISULFATE 75 MG/1
75 TABLET, FILM COATED ORAL DAILY
Refills: 0 | Status: DISCONTINUED | OUTPATIENT
Start: 2020-10-29 | End: 2020-10-29

## 2020-10-28 RX ORDER — ALPRAZOLAM 0.25 MG
0.5 TABLET ORAL
Refills: 0 | Status: DISCONTINUED | OUTPATIENT
Start: 2020-10-28 | End: 2020-10-28

## 2020-10-28 RX ORDER — LIDOCAINE HCL 20 MG/ML
0.2 VIAL (ML) INJECTION ONCE
Refills: 0 | Status: DISCONTINUED | OUTPATIENT
Start: 2020-10-28 | End: 2020-10-28

## 2020-10-28 RX ORDER — OXYCODONE HYDROCHLORIDE 5 MG/1
0 TABLET ORAL
Qty: 0 | Refills: 0 | DISCHARGE

## 2020-10-28 RX ORDER — FENTANYL CITRATE 50 UG/ML
25 INJECTION INTRAVENOUS
Refills: 0 | Status: DISCONTINUED | OUTPATIENT
Start: 2020-10-28 | End: 2020-10-28

## 2020-10-28 RX ORDER — HYDROMORPHONE HYDROCHLORIDE 2 MG/ML
0.25 INJECTION INTRAMUSCULAR; INTRAVENOUS; SUBCUTANEOUS
Refills: 0 | Status: DISCONTINUED | OUTPATIENT
Start: 2020-10-28 | End: 2020-10-28

## 2020-10-28 RX ORDER — OXYCODONE HYDROCHLORIDE 5 MG/1
5 TABLET ORAL EVERY 4 HOURS
Refills: 0 | Status: DISCONTINUED | OUTPATIENT
Start: 2020-10-28 | End: 2020-10-29

## 2020-10-28 RX ORDER — METOPROLOL TARTRATE 50 MG
1 TABLET ORAL
Qty: 0 | Refills: 0 | DISCHARGE

## 2020-10-28 RX ORDER — AMLODIPINE BESYLATE 2.5 MG/1
5 TABLET ORAL DAILY
Refills: 0 | Status: DISCONTINUED | OUTPATIENT
Start: 2020-10-28 | End: 2020-10-29

## 2020-10-28 RX ORDER — ATORVASTATIN CALCIUM 80 MG/1
80 TABLET, FILM COATED ORAL AT BEDTIME
Refills: 0 | Status: DISCONTINUED | OUTPATIENT
Start: 2020-10-28 | End: 2020-10-29

## 2020-10-28 RX ORDER — SODIUM CHLORIDE 9 MG/ML
3 INJECTION INTRAMUSCULAR; INTRAVENOUS; SUBCUTANEOUS EVERY 8 HOURS
Refills: 0 | Status: DISCONTINUED | OUTPATIENT
Start: 2020-10-28 | End: 2020-10-28

## 2020-10-28 RX ORDER — FENOFIBRATE,MICRONIZED 130 MG
145 CAPSULE ORAL DAILY
Refills: 0 | Status: DISCONTINUED | OUTPATIENT
Start: 2020-10-28 | End: 2020-10-29

## 2020-10-28 RX ORDER — AMLODIPINE BESYLATE AND BENAZEPRIL HYDROCHLORIDE 10; 20 MG/1; MG/1
1 CAPSULE ORAL
Qty: 0 | Refills: 0 | DISCHARGE

## 2020-10-28 RX ORDER — CHLORHEXIDINE GLUCONATE 213 G/1000ML
1 SOLUTION TOPICAL ONCE
Refills: 0 | Status: DISCONTINUED | OUTPATIENT
Start: 2020-10-28 | End: 2020-10-28

## 2020-10-28 RX ORDER — ASPIRIN/CALCIUM CARB/MAGNESIUM 324 MG
81 TABLET ORAL DAILY
Refills: 0 | Status: DISCONTINUED | OUTPATIENT
Start: 2020-10-29 | End: 2020-10-29

## 2020-10-28 RX ORDER — ALPRAZOLAM 0.25 MG
0 TABLET ORAL
Qty: 0 | Refills: 0 | DISCHARGE

## 2020-10-28 RX ADMIN — Medication 0.5 MILLIGRAM(S): at 22:24

## 2020-10-28 RX ADMIN — HYDROMORPHONE HYDROCHLORIDE 0.25 MILLIGRAM(S): 2 INJECTION INTRAMUSCULAR; INTRAVENOUS; SUBCUTANEOUS at 16:15

## 2020-10-28 RX ADMIN — HYDROMORPHONE HYDROCHLORIDE 0.25 MILLIGRAM(S): 2 INJECTION INTRAMUSCULAR; INTRAVENOUS; SUBCUTANEOUS at 16:30

## 2020-10-28 RX ADMIN — HYDROMORPHONE HYDROCHLORIDE 0.5 MILLIGRAM(S): 2 INJECTION INTRAMUSCULAR; INTRAVENOUS; SUBCUTANEOUS at 15:15

## 2020-10-28 RX ADMIN — Medication 400 MILLIGRAM(S): at 20:00

## 2020-10-28 RX ADMIN — OXYCODONE HYDROCHLORIDE 10 MILLIGRAM(S): 5 TABLET ORAL at 21:15

## 2020-10-28 RX ADMIN — HYDROMORPHONE HYDROCHLORIDE 0.5 MILLIGRAM(S): 2 INJECTION INTRAMUSCULAR; INTRAVENOUS; SUBCUTANEOUS at 15:00

## 2020-10-28 RX ADMIN — DEXTROSE MONOHYDRATE, SODIUM CHLORIDE, AND POTASSIUM CHLORIDE 75 MILLILITER(S): 50; .745; 4.5 INJECTION, SOLUTION INTRAVENOUS at 15:37

## 2020-10-28 NOTE — PATIENT PROFILE ADULT - PACKS PER DAY
,[email or web address] [email or web address],DirectAddress_Unknown,DirectAddress_Unknown 0.25
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
## 2020-10-29 ENCOUNTER — TRANSCRIPTION ENCOUNTER (OUTPATIENT)
Age: 60
End: 2020-10-29

## 2020-10-29 VITALS
SYSTOLIC BLOOD PRESSURE: 138 MMHG | RESPIRATION RATE: 17 BRPM | HEART RATE: 62 BPM | TEMPERATURE: 98 F | OXYGEN SATURATION: 99 % | DIASTOLIC BLOOD PRESSURE: 67 MMHG

## 2020-10-29 LAB
A1C WITH ESTIMATED AVERAGE GLUCOSE RESULT: 5.2 % — SIGNIFICANT CHANGE UP (ref 4–5.6)
ANION GAP SERPL CALC-SCNC: 11 MMOL/L — SIGNIFICANT CHANGE UP (ref 5–17)
BUN SERPL-MCNC: 10 MG/DL — SIGNIFICANT CHANGE UP (ref 7–23)
CALCIUM SERPL-MCNC: 9.7 MG/DL — SIGNIFICANT CHANGE UP (ref 8.4–10.5)
CHLORIDE SERPL-SCNC: 104 MMOL/L — SIGNIFICANT CHANGE UP (ref 96–108)
CO2 SERPL-SCNC: 21 MMOL/L — LOW (ref 22–31)
CREAT SERPL-MCNC: 0.87 MG/DL — SIGNIFICANT CHANGE UP (ref 0.5–1.3)
ESTIMATED AVERAGE GLUCOSE: 103 MG/DL — SIGNIFICANT CHANGE UP (ref 68–114)
GLUCOSE SERPL-MCNC: 115 MG/DL — HIGH (ref 70–99)
HCT VFR BLD CALC: 39.8 % — SIGNIFICANT CHANGE UP (ref 39–50)
HGB BLD-MCNC: 12.8 G/DL — LOW (ref 13–17)
LDLC SERPL DIRECT ASSAY-MCNC: 59 MG/DL — SIGNIFICANT CHANGE UP
MAGNESIUM SERPL-MCNC: 1.5 MG/DL — LOW (ref 1.6–2.6)
MCHC RBC-ENTMCNC: 27.9 PG — SIGNIFICANT CHANGE UP (ref 27–34)
MCHC RBC-ENTMCNC: 32.2 GM/DL — SIGNIFICANT CHANGE UP (ref 32–36)
MCV RBC AUTO: 86.7 FL — SIGNIFICANT CHANGE UP (ref 80–100)
NRBC # BLD: 0 /100 WBCS — SIGNIFICANT CHANGE UP (ref 0–0)
PHOSPHATE SERPL-MCNC: 2.5 MG/DL — SIGNIFICANT CHANGE UP (ref 2.5–4.5)
PLATELET # BLD AUTO: 320 K/UL — SIGNIFICANT CHANGE UP (ref 150–400)
POTASSIUM SERPL-MCNC: 4.1 MMOL/L — SIGNIFICANT CHANGE UP (ref 3.5–5.3)
POTASSIUM SERPL-SCNC: 4.1 MMOL/L — SIGNIFICANT CHANGE UP (ref 3.5–5.3)
RBC # BLD: 4.59 M/UL — SIGNIFICANT CHANGE UP (ref 4.2–5.8)
RBC # FLD: 16 % — HIGH (ref 10.3–14.5)
SODIUM SERPL-SCNC: 136 MMOL/L — SIGNIFICANT CHANGE UP (ref 135–145)
WBC # BLD: 13.53 K/UL — HIGH (ref 3.8–10.5)
WBC # FLD AUTO: 13.53 K/UL — HIGH (ref 3.8–10.5)

## 2020-10-29 PROCEDURE — C9399: CPT

## 2020-10-29 PROCEDURE — 85730 THROMBOPLASTIN TIME PARTIAL: CPT

## 2020-10-29 PROCEDURE — C1889: CPT

## 2020-10-29 PROCEDURE — 88304 TISSUE EXAM BY PATHOLOGIST: CPT

## 2020-10-29 PROCEDURE — 88311 DECALCIFY TISSUE: CPT

## 2020-10-29 PROCEDURE — 71045 X-RAY EXAM CHEST 1 VIEW: CPT

## 2020-10-29 PROCEDURE — 85027 COMPLETE CBC AUTOMATED: CPT

## 2020-10-29 PROCEDURE — 80048 BASIC METABOLIC PNL TOTAL CA: CPT

## 2020-10-29 PROCEDURE — 82553 CREATINE MB FRACTION: CPT

## 2020-10-29 PROCEDURE — 83036 HEMOGLOBIN GLYCOSYLATED A1C: CPT

## 2020-10-29 PROCEDURE — C1769: CPT

## 2020-10-29 PROCEDURE — 85610 PROTHROMBIN TIME: CPT

## 2020-10-29 PROCEDURE — 93005 ELECTROCARDIOGRAM TRACING: CPT

## 2020-10-29 PROCEDURE — 84484 ASSAY OF TROPONIN QUANT: CPT

## 2020-10-29 PROCEDURE — 83735 ASSAY OF MAGNESIUM: CPT

## 2020-10-29 PROCEDURE — 83721 ASSAY OF BLOOD LIPOPROTEIN: CPT

## 2020-10-29 PROCEDURE — 82550 ASSAY OF CK (CPK): CPT

## 2020-10-29 PROCEDURE — 84100 ASSAY OF PHOSPHORUS: CPT

## 2020-10-29 RX ORDER — ACETAMINOPHEN 500 MG
3 TABLET ORAL
Qty: 0 | Refills: 0 | DISCHARGE
Start: 2020-10-29

## 2020-10-29 RX ORDER — LISINOPRIL 2.5 MG/1
1 TABLET ORAL
Qty: 30 | Refills: 0
Start: 2020-10-29 | End: 2020-11-27

## 2020-10-29 RX ORDER — DEXTROSE MONOHYDRATE, SODIUM CHLORIDE, AND POTASSIUM CHLORIDE 50; .745; 4.5 G/1000ML; G/1000ML; G/1000ML
1000 INJECTION, SOLUTION INTRAVENOUS
Refills: 0 | Status: DISCONTINUED | OUTPATIENT
Start: 2020-10-29 | End: 2020-10-29

## 2020-10-29 RX ORDER — MAGNESIUM SULFATE 500 MG/ML
2 VIAL (ML) INJECTION ONCE
Refills: 0 | Status: COMPLETED | OUTPATIENT
Start: 2020-10-29 | End: 2020-10-29

## 2020-10-29 RX ORDER — HYDRALAZINE HCL 50 MG
20 TABLET ORAL ONCE
Refills: 0 | Status: COMPLETED | OUTPATIENT
Start: 2020-10-29 | End: 2020-10-29

## 2020-10-29 RX ORDER — ATORVASTATIN CALCIUM 80 MG/1
1 TABLET, FILM COATED ORAL
Qty: 0 | Refills: 0 | DISCHARGE
Start: 2020-10-29

## 2020-10-29 RX ORDER — METOPROLOL TARTRATE 50 MG
25 TABLET ORAL
Refills: 0 | Status: DISCONTINUED | OUTPATIENT
Start: 2020-10-29 | End: 2020-10-29

## 2020-10-29 RX ADMIN — Medication 975 MILLIGRAM(S): at 06:04

## 2020-10-29 RX ADMIN — CLOPIDOGREL BISULFATE 75 MILLIGRAM(S): 75 TABLET, FILM COATED ORAL at 11:39

## 2020-10-29 RX ADMIN — ATORVASTATIN CALCIUM 80 MILLIGRAM(S): 80 TABLET, FILM COATED ORAL at 00:33

## 2020-10-29 RX ADMIN — LISINOPRIL 40 MILLIGRAM(S): 2.5 TABLET ORAL at 06:05

## 2020-10-29 RX ADMIN — Medication 975 MILLIGRAM(S): at 11:39

## 2020-10-29 RX ADMIN — Medication 975 MILLIGRAM(S): at 00:33

## 2020-10-29 RX ADMIN — Medication 50 GRAM(S): at 07:57

## 2020-10-29 RX ADMIN — HEPARIN SODIUM 5000 UNIT(S): 5000 INJECTION INTRAVENOUS; SUBCUTANEOUS at 11:39

## 2020-10-29 RX ADMIN — Medication 20 MILLIGRAM(S): at 03:38

## 2020-10-29 RX ADMIN — OXYCODONE HYDROCHLORIDE 10 MILLIGRAM(S): 5 TABLET ORAL at 02:15

## 2020-10-29 RX ADMIN — OXYCODONE HYDROCHLORIDE 10 MILLIGRAM(S): 5 TABLET ORAL at 01:45

## 2020-10-29 RX ADMIN — AMLODIPINE BESYLATE 5 MILLIGRAM(S): 2.5 TABLET ORAL at 06:05

## 2020-10-29 RX ADMIN — Medication 85 MILLIMOLE(S): at 08:58

## 2020-10-29 RX ADMIN — HEPARIN SODIUM 5000 UNIT(S): 5000 INJECTION INTRAVENOUS; SUBCUTANEOUS at 00:34

## 2020-10-29 RX ADMIN — Medication 81 MILLIGRAM(S): at 11:39

## 2020-10-29 RX ADMIN — HEPARIN SODIUM 5000 UNIT(S): 5000 INJECTION INTRAVENOUS; SUBCUTANEOUS at 06:05

## 2020-10-29 RX ADMIN — Medication 145 MILLIGRAM(S): at 11:39

## 2020-10-29 NOTE — PROGRESS NOTE ADULT - SUBJECTIVE AND OBJECTIVE BOX
VASCULAR SURGERY DAILY PROGRESS NOTE:     SUBJECTIVE/ROS: Patient seen and examined. He is without complaints. He denies pain, SOB, chest pain.    MEDICATIONS  (STANDING):  acetaminophen   Tablet .. 975 milliGRAM(s) Oral every 6 hours  amLODIPine   Tablet 5 milliGRAM(s) Oral daily  aspirin enteric coated 81 milliGRAM(s) Oral daily  atorvastatin 80 milliGRAM(s) Oral at bedtime  ceFAZolin   IVPB 2000 milliGRAM(s) IV Intermittent once  ceFAZolin   IVPB 2000 milliGRAM(s) IV Intermittent once  clopidogrel Tablet 75 milliGRAM(s) Oral daily  dextrose 5% + sodium chloride 0.45% with potassium chloride 20 mEq/L 1000 milliLiter(s) (75 mL/Hr) IV Continuous <Continuous>  fenofibrate Tablet 145 milliGRAM(s) Oral daily  heparin   Injectable 5000 Unit(s) SubCutaneous every 8 hours  lisinopril 40 milliGRAM(s) Oral daily  magnesium sulfate  IVPB 2 Gram(s) IV Intermittent once  sodium phosphate IVPB 30 milliMole(s) IV Intermittent once    MEDICATIONS  (PRN):  ALPRAZolam 0.5 milliGRAM(s) Oral two times a day PRN agitation  oxyCODONE    IR 5 milliGRAM(s) Oral every 4 hours PRN Moderate Pain (4 - 6)  oxyCODONE    IR 10 milliGRAM(s) Oral every 4 hours PRN Severe Pain (7 - 10)      OBJECTIVE:    Vital Signs Last 24 Hrs  T(C): 36.8 (29 Oct 2020 05:52), Max: 36.8 (28 Oct 2020 16:00)  T(F): 98.2 (29 Oct 2020 05:52), Max: 98.2 (28 Oct 2020 16:00)  HR: 83 (29 Oct 2020 05:52) (53 - 85)  BP: 135/79 (29 Oct 2020 05:52) (91/52 - 187/95)  BP(mean): 78 (28 Oct 2020 20:30) (67 - 118)  RR: 17 (29 Oct 2020 05:52) (12 - 22)  SpO2: 99% (29 Oct 2020 05:52) (91% - 100%)        I&O's Detail    28 Oct 2020 07:01  -  29 Oct 2020 07:00  --------------------------------------------------------  IN:    dextrose 5% + sodium chloride 0.45% w/ Additives: 450 mL    Oral Fluid: 435 mL  Total IN: 885 mL    OUT:    Voided (mL): 1000 mL  Total OUT: 1000 mL    Total NET: -115 mL          Daily Height in cm: 190.5 (28 Oct 2020 12:53)    Daily     LABS:                        12.8   13.53 )-----------( 320      ( 29 Oct 2020 06:30 )             39.8     10-29    136  |  104  |  10  ----------------------------<  115<H>  4.1   |  21<L>  |  0.87    Ca    9.7      29 Oct 2020 06:30  Phos  2.5     10-29  Mg     1.5     10-29      PT/INR - ( 28 Oct 2020 23:35 )   PT: 13.1 sec;   INR: 1.10 ratio         PTT - ( 28 Oct 2020 23:35 )  PTT:29.5 sec      PHYSICAL EXAM:    Gen: NAD, anxious  HEENT: Tenderness around surgical incision with mild swelling when compared to contralateral side with small area of ecchymosis in caudal aspect. Soft and depressible and CDI.   Resp: breathing easily, no stridor, CTAB, no respiratory distress  CV: RRR, no rubs, or murmurs  Abdomen: soft, nontender, nondistended  Skin: Incision c/d/i. Normal color, no rashes or cyanosis

## 2020-10-29 NOTE — DISCHARGE NOTE PROVIDER - NSDCCPCAREPLAN_GEN_ALL_CORE_FT
PRINCIPAL DISCHARGE DIAGNOSIS  Diagnosis: History of carotid artery stenosis  Assessment and Plan of Treatment: s/p R carotid endarterectomy  Return to ER for temperatures greater than 101, chills sweats, pain not controlled with pain medications, persistent headaches, nausea and/or vomiting, asymmetrical weakness, neurological or mental status changes.  Please keep incision sites clean and dry, shower only.  Do not bathe or immerse incision sites in water for a prolonged amount of time.  May remove gauze dressing in 24hrs.  Steri-strips may fall of on their own in the shower.        SECONDARY DISCHARGE DIAGNOSES  Diagnosis: CAD (coronary artery disease)  Assessment and Plan of Treatment: Continue home medications as prescribed and please follow up with your primary care physician within 1-2 weeks of discharge      Diagnosis: Hyperlipemia  Assessment and Plan of Treatment: Continue home medications as prescribed and please follow up with your primary care physician within 1-2 weeks of discharge      Diagnosis: Hypertension  Assessment and Plan of Treatment: Continue home medications as prescribed and please follow up with your primary care physician within 1-2 weeks of discharge

## 2020-10-29 NOTE — DISCHARGE NOTE PROVIDER - NSDCMRMEDTOKEN_GEN_ALL_CORE_FT
ALPRAZolam 0.5 mg oral tablet: orally 2 times a day  aspirin 81 mg oral tablet: orally once a day  atorvastatin 80 mg oral tablet: 1 tab(s) orally once a day (at bedtime)  Chantix 1 mg oral tablet: 1 tab(s) orally 2 times a day  Lotrel 5 mg-40 mg oral capsule: 1 cap(s) orally once a day  metoprolol tartrate 25 mg oral tablet: 1 tab(s) orally 2 times a day  oxyCODONE 10 mg oral tablet: orally 2 times a day  Plavix 75 mg oral tablet: 1 tab(s) orally once a day  Trilipix 135 mg oral delayed release capsule: 1 cap(s) orally once a day   acetaminophen 325 mg oral tablet: 3 tab(s) orally every 6 hours  ALPRAZolam 0.5 mg oral tablet: orally 2 times a day  aspirin 81 mg oral tablet: orally once a day  atorvastatin 80 mg oral tablet: 1 tab(s) orally once a day (at bedtime)  Chantix 1 mg oral tablet: 1 tab(s) orally 2 times a day  Lotrel 5 mg-40 mg oral capsule: 1 cap(s) orally once a day  metoprolol tartrate 25 mg oral tablet: 1 tab(s) orally 2 times a day  oxyCODONE 10 mg oral tablet: orally 2 times a day  Plavix 75 mg oral tablet: 1 tab(s) orally once a day  Trilipix 135 mg oral delayed release capsule: 1 cap(s) orally once a day

## 2020-10-29 NOTE — DISCHARGE NOTE NURSING/CASE MANAGEMENT/SOCIAL WORK - NSDCVIVACCINE_GEN_ALL_CORE_FT
Influenza , 2014/1/2 13:25 , Yamel Henriquez (RN)  Influenza , 2019/2/14 14:47 , Roxana Anders (RN)

## 2020-10-29 NOTE — DISCHARGE NOTE NURSING/CASE MANAGEMENT/SOCIAL WORK - PATIENT PORTAL LINK FT
You can access the FollowMyHealth Patient Portal offered by [facility] by registering at the following website: [email or web address]. By joining [employer]’s FollowMyHealth portal, you will also be able to view your health information using other applications (apps) compatible with our system.

## 2020-10-29 NOTE — PROGRESS NOTE ADULT - SUBJECTIVE AND OBJECTIVE BOX
Subjective:   Patient examined at bedside this AM. Overnight he endorsed chest pain, which was worked up with EKG, CXR, CBC, BMP, troponin, all of which were negative for findings suggestive of MI. He no longer endorses chest pain and reports his neck pain is controlled. Denies dyspnea, nausea or vomiting.    Objective:  Vital Signs  T(C): 36.8 (10-29 @ 05:52), Max: 36.8 (10-28 @ 16:00)  HR: 83 (10-29 @ 05:52) (53 - 85)  BP: 135/79 (10-29 @ 05:52) (91/52 - 187/95)  RR: 17 (10-29 @ 05:52) (12 - 22)  SpO2: 99% (10-29 @ 05:52) (91% - 100%)  10-28-20 @ 07:01  -  10-29-20 @ 07:00  --------------------------------------------------------  IN: 885 mL / OUT: 1000 mL / NET: -115 mL        Physical Exam:  Gen: NAD  HEENT: Tenderness around surgical incision with mild swelling when compared to contralateral side with small area of ecchymosis in caudal aspect. Soft and depressible and dressing C/D/I.   Resp: breathing easily, no stridor, CTAB, no respiratory distress  CV: RRR, no rubs, or murmurs  Abdomen: soft, nontender, nondistended  Skin: Normal color, no rashes or cyanosis    Labs:                        12.8   13.53 )-----------( 320      ( 29 Oct 2020 06:30 )             39.8   10-29    136  |  104  |  10  ----------------------------<  115<H>  4.1   |  21<L>  |  0.87    Ca    9.7      29 Oct 2020 06:30  Phos  2.5     10-29  Mg     1.5     10-29      CAPILLARY BLOOD GLUCOSE          Medications:   MEDICATIONS  (STANDING):  acetaminophen   Tablet .. 975 milliGRAM(s) Oral every 6 hours  amLODIPine   Tablet 5 milliGRAM(s) Oral daily  aspirin enteric coated 81 milliGRAM(s) Oral daily  atorvastatin 80 milliGRAM(s) Oral at bedtime  clopidogrel Tablet 75 milliGRAM(s) Oral daily  dextrose 5% + sodium chloride 0.45% with potassium chloride 20 mEq/L 1000 milliLiter(s) (75 mL/Hr) IV Continuous <Continuous>  fenofibrate Tablet 145 milliGRAM(s) Oral daily  heparin   Injectable 5000 Unit(s) SubCutaneous every 8 hours  lisinopril 40 milliGRAM(s) Oral daily  sodium phosphate IVPB 30 milliMole(s) IV Intermittent once    MEDICATIONS  (PRN):  ALPRAZolam 0.5 milliGRAM(s) Oral two times a day PRN agitation  oxyCODONE    IR 5 milliGRAM(s) Oral every 4 hours PRN Moderate Pain (4 - 6)  oxyCODONE    IR 10 milliGRAM(s) Oral every 4 hours PRN Severe Pain (7 - 10)

## 2020-10-29 NOTE — PROGRESS NOTE ADULT - ASSESSMENT
60y M who is now POD1 s/p R CEA, recovering appropriately.    Plan:  - Pain control as needed with oxycodone PRN  - Home medication with Xanax BID  - Assess if tolerating diet and advance as indicated  - DVT ppx  - Encourage OOB and ambulating as tolerated  - F/U Labs  - IS  - discharge planning     [id number]

## 2020-10-29 NOTE — DISCHARGE NOTE PROVIDER - CARE PROVIDER_API CALL
Pedro Madrigal  VASCULAR SURGERY  [facility] [facility], [address]  [place] [address]  Phone: [phone]  Fax: [phone]  Follow Up Time: 2 weeks

## 2020-10-29 NOTE — CONSULT NOTE ADULT - ASSESSMENT
59yo M PMHx tobacco use, CAD sp PCI, HTN, HLD, PAD sp fem-fem bypass x 2, carotid artery stenosis sp L CEA 2/2019, found to have 90% R carotid artery here for R CEA. Medicine consult for co management.    # CAD  # PAD  # HTN  # HLD  # tobacco use  # atypical chest pain    appreciate vascular recs  PT OOB  pain control  atypical chest pain, no active CP, ACS ruled out enzymes neg, likely reflux, now resolved, no further cardiac workup indicated  cont asa plavix  BP stable  cont norvasc acei  cont statin and tricor    DVT PPX HSQ    PCP Dr. Jose Mendoza    Thank you for this consult. Please call [facility] with questions [phone]. 59yo M PMHx tobacco use, CAD sp PCI, HTN, HLD, PAD sp fem-fem bypass x 2, carotid artery stenosis sp L CEA 2/2019, found to have 90% R carotid artery here for R CEA. Medicine consult for co management.    # CAD  # PAD  # HTN  # HLD  # tobacco use  # atypical chest pain  # carotid artery stenosis sp R CEA POD #1    appreciate vascular recs  PT OOB  pain control  atypical chest pain, no active CP, ACS ruled out enzymes neg, likely reflux, now resolved, no further cardiac workup indicated  cont asa plavix  BP stable  cont norvasc acei  cont statin and tricor    DVT PPX HSQ    PCP Dr. Jose Mendoza    Thank you for this consult. [facility] [phone].

## 2020-10-29 NOTE — PROVIDER CONTACT NOTE (OTHER) - ACTION/TREATMENT ORDERED:
Provider aware; provider to come assess patient at bedside. NC 2L placed on patient with CPOX monitoring, EKG, Stat CXR, CBC, BMP, Troponin Labs

## 2020-10-29 NOTE — PROGRESS NOTE ADULT - ASSESSMENT
Assessment:  60y M PMH of severe anxiety on xanax s/p right CEA. Cardiac workup negative for acute MI. Symptoms can likely be attributed to anxiety.    Plan:  - Pain control as needed with oxycodone  - Xanax BID PRN for anxiety  - Diet: advance as indicated  - DVT ppx  - Encourage OOB and ambulating as tolerated  - F/U Labs  - IS      Rusty Keenan, PGY-1  Vascular Surgery  [phone]
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
## 2020-10-29 NOTE — DISCHARGE NOTE PROVIDER - HOSPITAL COURSE
Pt. tolerated procedure well and was transferred to the recovery room where pt was closely managed for postoperative pain and blood pressure control, and then transferred to the floor without incidence.  Pt. was mainly managed for postoperative pain, wound care, as well as close monitoring of fluid resuscitation, neurological status and electrolyte repletion.  Pt has been tolerating a diet, voiding, ambulating, and the pain is now well controlled.   Pt. is ready for discharge home in stable condition, and will follow up in two weeks as an outpatient with 60 year old male current tobacco user with PMH of CAD s/p stents in 1995, 2014, PD, s/p fem- fem bypass x 2 ( most recent march 2020) carotid artery disease s/p left carotid endarterectomy feb 2019, HTN, HLD, found to have 90% occlusion of R carotid artery. Pt presented for scheduled R carotid endarterectomy.     On 10/29 pt underwent R carotid endarterectomy. Pt. tolerated procedure well and was transferred to the recovery room where pt was closely managed for postoperative pain and blood pressure control, and then transferred to the floor without incidence.  Pt. was mainly managed for postoperative pain, wound care, as well as close monitoring of fluid resuscitation, neurological status and electrolyte repletion.  Pt has been tolerating a diet, voiding, ambulating, and the pain is now well controlled.   Pt. is ready for discharge home in stable condition, and will follow up in two weeks as an outpatient with Dr. Madrigal.

## 2020-11-03 LAB — SURGICAL PATHOLOGY STUDY: SIGNIFICANT CHANGE UP

## 2020-11-23 ENCOUNTER — APPOINTMENT (OUTPATIENT)
Dept: CARDIOLOGY | Facility: CLINIC | Age: 60
End: 2020-11-23

## 2020-12-18 ENCOUNTER — APPOINTMENT (OUTPATIENT)
Dept: VASCULAR SURGERY | Facility: CLINIC | Age: 60
End: 2020-12-18

## 2021-02-22 ENCOUNTER — APPOINTMENT (OUTPATIENT)
Dept: CARDIOLOGY | Facility: CLINIC | Age: 61
End: 2021-02-22
Payer: COMMERCIAL

## 2021-02-22 ENCOUNTER — NON-APPOINTMENT (OUTPATIENT)
Age: 61
End: 2021-02-22

## 2021-02-22 VITALS
SYSTOLIC BLOOD PRESSURE: 158 MMHG | OXYGEN SATURATION: 99 % | HEIGHT: 75 IN | WEIGHT: 213 LBS | DIASTOLIC BLOOD PRESSURE: 98 MMHG | BODY MASS INDEX: 26.49 KG/M2 | HEART RATE: 80 BPM

## 2021-02-22 DIAGNOSIS — I73.9 PERIPHERAL VASCULAR DISEASE, UNSPECIFIED: ICD-10-CM

## 2021-02-22 PROCEDURE — 99072 ADDL SUPL MATRL&STAF TM PHE: CPT

## 2021-02-22 PROCEDURE — 93000 ELECTROCARDIOGRAM COMPLETE: CPT

## 2021-02-22 PROCEDURE — 99214 OFFICE O/P EST MOD 30 MIN: CPT

## 2021-02-22 RX ORDER — AMLODIPINE BESYLATE AND BENAZEPRIL HYDROCHLORIDE 5; 40 MG/1; MG/1
5-40 CAPSULE ORAL DAILY
Qty: 90 | Refills: 3 | Status: DISCONTINUED | COMMUNITY
End: 2021-02-22

## 2021-02-22 RX ORDER — VARENICLINE TARTRATE 1 MG/1
TABLET, FILM COATED ORAL
Refills: 0 | Status: DISCONTINUED | COMMUNITY
End: 2021-02-22

## 2021-02-22 RX ORDER — NITROGLYCERIN 0.4 MG/1
0.4 TABLET SUBLINGUAL
Qty: 100 | Refills: 0 | Status: ACTIVE | COMMUNITY
Start: 2020-10-23

## 2021-02-22 RX ORDER — ESCITALOPRAM OXALATE 10 MG/1
10 TABLET ORAL
Qty: 90 | Refills: 0 | Status: DISCONTINUED | COMMUNITY
Start: 2020-11-09

## 2021-02-22 RX ORDER — VARENICLINE TARTRATE 0.5 (11)-1
0.5 MG X 11 & KIT ORAL
Qty: 53 | Refills: 0 | Status: DISCONTINUED | COMMUNITY
Start: 2020-09-08

## 2021-02-23 ENCOUNTER — NON-APPOINTMENT (OUTPATIENT)
Age: 61
End: 2021-02-23

## 2021-02-23 PROBLEM — I73.9 PERIPHERAL VASCULAR DISEASE: Status: ACTIVE | Noted: 2018-12-24

## 2021-02-23 NOTE — DISCUSSION/SUMMARY
[FreeTextEntry1] : Patient with ASVD currently stable.  He can undergo epidural injections safely.  Antiplatelet meds can be held as per routine.

## 2021-02-23 NOTE — PHYSICAL EXAM
[General Appearance - Well Developed] : well developed [Normal Appearance] : normal appearance [Well Groomed] : well groomed [General Appearance - Well Nourished] : well nourished [No Deformities] : no deformities [General Appearance - In No Acute Distress] : no acute distress [Normal Conjunctiva] : the conjunctiva exhibited no abnormalities [Eyelids - No Xanthelasma] : the eyelids demonstrated no xanthelasmas [Normal Oral Mucosa] : normal oral mucosa [No Oral Pallor] : no oral pallor [No Oral Cyanosis] : no oral cyanosis [Normal Jugular Venous A Waves Present] : normal jugular venous A waves present [Normal Jugular Venous V Waves Present] : normal jugular venous V waves present [No Jugular Venous Richey A Waves] : no jugular venous richey A waves [Respiration, Rhythm And Depth] : normal respiratory rhythm and effort [Exaggerated Use Of Accessory Muscles For Inspiration] : no accessory muscle use [Auscultation Breath Sounds / Voice Sounds] : lungs were clear to auscultation bilaterally [Heart Rate And Rhythm] : heart rate and rhythm were normal [Murmurs] : no murmurs present [Heart Sounds] : normal S1 and S2 [Abdomen Soft] : soft [Abdomen Tenderness] : non-tender [Abdomen Mass (___ Cm)] : no abdominal mass palpated [Abnormal Walk] : normal gait [Gait - Sufficient For Exercise Testing] : the gait was sufficient for exercise testing [Nail Clubbing] : no clubbing of the fingernails [Cyanosis, Localized] : no localized cyanosis [Petechial Hemorrhages (___cm)] : no petechial hemorrhages [Skin Color & Pigmentation] : normal skin color and pigmentation [] : no rash [Skin Lesions] : no skin lesions [No Venous Stasis] : no venous stasis [No Skin Ulcers] : no skin ulcer [No Xanthoma] : no  xanthoma was observed [Oriented To Time, Place, And Person] : oriented to person, place, and time [Affect] : the affect was normal [Mood] : the mood was normal [No Anxiety] : not feeling anxious

## 2021-02-23 NOTE — HISTORY OF PRESENT ILLNESS
[FreeTextEntry1] : Patient with CAD s/p stent and severe PVD.  No ischemia on recent nuclear.  Unsuccessful revascularization of the right carotid followed by a TIA.  Currently free of cardiac or neurological symptoms.  He is smoking again.

## 2021-06-16 ENCOUNTER — NON-APPOINTMENT (OUTPATIENT)
Age: 61
End: 2021-06-16

## 2021-09-17 ENCOUNTER — OUTPATIENT (OUTPATIENT)
Dept: OUTPATIENT SERVICES | Facility: HOSPITAL | Age: 61
LOS: 1 days | End: 2021-09-17
Payer: COMMERCIAL

## 2021-09-17 DIAGNOSIS — Z98.89 OTHER SPECIFIED POSTPROCEDURAL STATES: Chronic | ICD-10-CM

## 2021-09-17 DIAGNOSIS — Z96.60 PRESENCE OF UNSPECIFIED ORTHOPEDIC JOINT IMPLANT: Chronic | ICD-10-CM

## 2021-09-17 DIAGNOSIS — Z98.890 OTHER SPECIFIED POSTPROCEDURAL STATES: Chronic | ICD-10-CM

## 2021-09-17 DIAGNOSIS — Z98.61 CORONARY ANGIOPLASTY STATUS: Chronic | ICD-10-CM

## 2021-09-17 DIAGNOSIS — Z20.828 CONTACT WITH AND (SUSPECTED) EXPOSURE TO OTHER VIRAL COMMUNICABLE DISEASES: ICD-10-CM

## 2021-09-17 DIAGNOSIS — Z90.49 ACQUIRED ABSENCE OF OTHER SPECIFIED PARTS OF DIGESTIVE TRACT: Chronic | ICD-10-CM

## 2021-09-17 DIAGNOSIS — Z90.89 ACQUIRED ABSENCE OF OTHER ORGANS: Chronic | ICD-10-CM

## 2021-09-17 DIAGNOSIS — Z98.62 PERIPHERAL VASCULAR ANGIOPLASTY STATUS: Chronic | ICD-10-CM

## 2021-09-17 LAB — SARS-COV-2 RNA SPEC QL NAA+PROBE: SIGNIFICANT CHANGE UP

## 2021-09-17 PROCEDURE — U0003: CPT

## 2021-09-17 PROCEDURE — U0005: CPT

## 2021-11-16 ENCOUNTER — OUTPATIENT (OUTPATIENT)
Dept: OUTPATIENT SERVICES | Facility: HOSPITAL | Age: 61
LOS: 1 days | End: 2021-11-16
Payer: COMMERCIAL

## 2021-11-16 DIAGNOSIS — Z96.60 PRESENCE OF UNSPECIFIED ORTHOPEDIC JOINT IMPLANT: Chronic | ICD-10-CM

## 2021-11-16 DIAGNOSIS — Z90.89 ACQUIRED ABSENCE OF OTHER ORGANS: Chronic | ICD-10-CM

## 2021-11-16 DIAGNOSIS — Z90.49 ACQUIRED ABSENCE OF OTHER SPECIFIED PARTS OF DIGESTIVE TRACT: Chronic | ICD-10-CM

## 2021-11-16 DIAGNOSIS — Z98.62 PERIPHERAL VASCULAR ANGIOPLASTY STATUS: Chronic | ICD-10-CM

## 2021-11-16 DIAGNOSIS — Z98.890 OTHER SPECIFIED POSTPROCEDURAL STATES: Chronic | ICD-10-CM

## 2021-11-16 DIAGNOSIS — Z20.828 CONTACT WITH AND (SUSPECTED) EXPOSURE TO OTHER VIRAL COMMUNICABLE DISEASES: ICD-10-CM

## 2021-11-16 DIAGNOSIS — Z98.89 OTHER SPECIFIED POSTPROCEDURAL STATES: Chronic | ICD-10-CM

## 2021-11-16 DIAGNOSIS — Z98.61 CORONARY ANGIOPLASTY STATUS: Chronic | ICD-10-CM

## 2021-11-16 LAB — SARS-COV-2 RNA SPEC QL NAA+PROBE: SIGNIFICANT CHANGE UP

## 2021-11-16 PROCEDURE — U0005: CPT

## 2021-11-16 PROCEDURE — U0003: CPT

## 2022-04-04 NOTE — PRE-OP CHECKLIST - PATIENT'S PERSONAL PROPERTY GIVEN TO
[facility]    Method of interview communication: in-person    Patient was asked about OTC/herbal products specifically.  PTA med list reflects this.    Based on the pharmacist's assessment, the PTA med list information appears reliable    Allergies were reviewed, assessed, and updated with the patient.      Patient does not use any multi-dose medications prior to admission.     Thank you for the opportunity to participate in the care of this patient.      Tucker Luna [facility]     4/4/2022     1:48 PM     on unit/family member

## 2022-07-15 NOTE — ED ADULT TRIAGE NOTE - TEMPERATURE IN CELSIUS (DEGREES C)
36.4 Deteriorating patient status - Patient was clinically upgraded due to deteriorating patient status.

## 2022-08-08 NOTE — H&P CARDIOLOGY - TEMPERATURE IN CELSIUS (DEGREES C)
Pt is AAOx4, afebrile, and VSS overnight. Crowley w/ red tinged UO overnight. See flow sheet for values. Pt's crowley leaked overnight, crowley irrigated and balloon re inflated. Pt complained of bladder spasms overnight, MD notified and PO TID Ditropan ordered. Pt had NS gtts @ 50 cc/hr cont overnight. Pt is in bed in the lowest position, wheels locked, and call light in reach.   36.5

## 2022-11-21 VITALS — HEIGHT: 75 IN | WEIGHT: 250 LBS | BODY MASS INDEX: 31.08 KG/M2

## 2023-01-06 ENCOUNTER — NON-APPOINTMENT (OUTPATIENT)
Age: 63
End: 2023-01-06

## 2023-01-06 DIAGNOSIS — Z86.79 PERSONAL HISTORY OF OTHER DISEASES OF THE CIRCULATORY SYSTEM: ICD-10-CM

## 2023-01-06 DIAGNOSIS — Z87.81 PERSONAL HISTORY OF (HEALED) TRAUMATIC FRACTURE: ICD-10-CM

## 2023-01-06 DIAGNOSIS — G89.29 OTHER CHRONIC PAIN: ICD-10-CM

## 2023-01-06 DIAGNOSIS — F17.200 NICOTINE DEPENDENCE, UNSPECIFIED, UNCOMPLICATED: ICD-10-CM

## 2023-01-06 DIAGNOSIS — Z79.891 LONG TERM (CURRENT) USE OF OPIATE ANALGESIC: ICD-10-CM

## 2023-01-20 ENCOUNTER — APPOINTMENT (OUTPATIENT)
Dept: PAIN MANAGEMENT | Facility: CLINIC | Age: 63
End: 2023-01-20
Payer: COMMERCIAL

## 2023-01-20 VITALS — HEIGHT: 75 IN | WEIGHT: 200 LBS | BODY MASS INDEX: 24.87 KG/M2

## 2023-01-20 PROCEDURE — 99213 OFFICE O/P EST LOW 20 MIN: CPT | Mod: 95

## 2023-01-20 NOTE — HISTORY OF PRESENT ILLNESS
[7] : 7 [Constant] : constant [Household chores] : household chores [Sleep] : sleep [Meds] : meds [Walking] : walking [Retired] : Work status: retired [Home] : at home, [unfilled] , at the time of the visit. [Medical Office: ([facility])___] : at the medical office located in  [Verbal consent obtained from patient] : the patient, [unfilled] [] : no [FreeTextEntry7] : LT LEG GREATER RT SIDE

## 2023-01-20 NOTE — ASSESSMENT
[FreeTextEntry1] : Interim history\par The patient is tolerating their medications without problems. There has no new pains, injuries, or complaints and no new issues. The use of medications appears appropriate and there are no aberrant behaviors noted. Side effects to current medications are denied. Average pain score for the month is 5 out of ten. The patient's current medications are documented to the best of their ability. THe  was obtained and reviewed prior to the visit, and any discrepancies were discussed with the patient.\par Objective information\par Since the last visit there are no additional radiologic studies, labs, or pain complaints. There are no changes in the patient's physical status.\par Plan\par The patient was given refill of their medication at their current level and will return to the office as needed for follow-up. The patient is showing no aberrant behavior or evidence of diversion. Opioid contract and opioid risk assessment on chart.  reviewed and any discrepancy discussed with patent. Applicable urine toxicology reviewed and recorded in the patient's electronic record. Urine toxicology is ordered for patient per office protocol or patient's risk assessment.  Patient will follow-up in 1 month unless new issues arise the patient has returned earlier.\par

## 2023-01-26 NOTE — H&P PST ADULT - NSANTHAGERD_ENT_A_CORE
Ndc (75 Mg/0.5ml Syringe: 26-Gauge Needle): [address]-46 Ndc (75 Mg/0.5ml Syringe): [address]-61 Yes

## 2023-01-30 ENCOUNTER — APPOINTMENT (OUTPATIENT)
Dept: CARDIOLOGY | Facility: CLINIC | Age: 63
End: 2023-01-30
Payer: COMMERCIAL

## 2023-01-30 ENCOUNTER — NON-APPOINTMENT (OUTPATIENT)
Age: 63
End: 2023-01-30

## 2023-01-30 VITALS
HEART RATE: 65 BPM | OXYGEN SATURATION: 100 % | SYSTOLIC BLOOD PRESSURE: 180 MMHG | HEIGHT: 75 IN | RESPIRATION RATE: 16 BRPM | WEIGHT: 200 LBS | BODY MASS INDEX: 24.87 KG/M2 | DIASTOLIC BLOOD PRESSURE: 107 MMHG

## 2023-01-30 DIAGNOSIS — I10 ESSENTIAL (PRIMARY) HYPERTENSION: ICD-10-CM

## 2023-01-30 PROCEDURE — 99213 OFFICE O/P EST LOW 20 MIN: CPT | Mod: 25

## 2023-01-30 PROCEDURE — 93000 ELECTROCARDIOGRAM COMPLETE: CPT

## 2023-01-30 RX ORDER — BUPROPION HYDROCHLORIDE 150 MG/1
150 TABLET, FILM COATED, EXTENDED RELEASE ORAL
Qty: 90 | Refills: 0 | Status: DISCONTINUED | COMMUNITY
Start: 2021-02-08 | End: 2023-01-30

## 2023-01-30 RX ORDER — TIZANIDINE 2 MG/1
2 TABLET ORAL
Refills: 0 | Status: DISCONTINUED | COMMUNITY
End: 2023-01-30

## 2023-01-30 RX ORDER — METOPROLOL TARTRATE 25 MG/1
25 TABLET, FILM COATED ORAL TWICE DAILY
Refills: 0 | Status: DISCONTINUED | COMMUNITY
End: 2023-01-30

## 2023-01-30 RX ORDER — AMLODIPINE BESYLATE AND BENAZEPRIL HYDROCHLORIDE 10; 40 MG/1; MG/1
10-40 CAPSULE ORAL DAILY
Refills: 0 | Status: DISCONTINUED | COMMUNITY
End: 2023-01-30

## 2023-01-30 RX ORDER — CEPHALEXIN 500 MG/1
500 CAPSULE ORAL
Refills: 0 | Status: DISCONTINUED | COMMUNITY
End: 2023-01-30

## 2023-01-30 RX ORDER — PREDNISONE 10 MG/1
10 TABLET ORAL TWICE DAILY
Refills: 0 | Status: DISCONTINUED | COMMUNITY
End: 2023-01-30

## 2023-01-30 RX ORDER — FENOFIBRIC ACID 135 MG/1
135 CAPSULE, DELAYED RELEASE ORAL DAILY
Refills: 0 | Status: DISCONTINUED | COMMUNITY
End: 2023-01-30

## 2023-01-30 RX ORDER — FENOFIBRIC ACID 135 MG/1
135 CAPSULE, DELAYED RELEASE ORAL
Refills: 0 | Status: DISCONTINUED | COMMUNITY
End: 2023-01-30

## 2023-01-30 RX ORDER — DULOXETINE HYDROCHLORIDE 30 MG/1
30 CAPSULE, DELAYED RELEASE PELLETS ORAL DAILY
Refills: 0 | Status: DISCONTINUED | COMMUNITY
End: 2023-01-30

## 2023-01-30 RX ORDER — ALLOPURINOL 100 MG/1
100 TABLET ORAL DAILY
Refills: 0 | Status: DISCONTINUED | COMMUNITY
End: 2023-01-30

## 2023-01-30 RX ORDER — ALBUTEROL SULFATE 2.5 MG/3ML
(2.5 MG/3ML) SOLUTION RESPIRATORY (INHALATION)
Refills: 0 | Status: DISCONTINUED | COMMUNITY
End: 2023-01-30

## 2023-01-30 RX ORDER — AMLODIPINE BESYLATE 5 MG/1
5 TABLET ORAL DAILY
Qty: 90 | Refills: 3 | Status: ACTIVE | COMMUNITY
Start: 2023-01-30 | End: 1900-01-01

## 2023-01-30 NOTE — HISTORY OF PRESENT ILLNESS
[FreeTextEntry1] : 62 year old smoker with CAD and PAD.  HTN but off his antihypertensive, Lotrel 10-40 due to hypotension and syncope.  Prior to planned endoscopy, BP was elevated.  He has not had angina or dyspnea.

## 2023-01-30 NOTE — DISCUSSION/SUMMARY
[FreeTextEntry1] : Patient with CAD, no ischemic symptoms, HTN.  Will add amlodipine 5 mg to his current metoprolol 25 mg.  His wife will monitor his BP response. [EKG obtained to assist in diagnosis and management of assessed problem(s)] : EKG obtained to assist in diagnosis and management of assessed problem(s)

## 2023-02-15 ENCOUNTER — APPOINTMENT (OUTPATIENT)
Dept: PAIN MANAGEMENT | Facility: CLINIC | Age: 63
End: 2023-02-15

## 2023-02-24 ENCOUNTER — APPOINTMENT (OUTPATIENT)
Dept: PAIN MANAGEMENT | Facility: CLINIC | Age: 63
End: 2023-02-24
Payer: COMMERCIAL

## 2023-02-24 VITALS — WEIGHT: 200 LBS | BODY MASS INDEX: 24.87 KG/M2 | HEIGHT: 75 IN

## 2023-02-24 PROCEDURE — 99213 OFFICE O/P EST LOW 20 MIN: CPT | Mod: 95

## 2023-02-24 NOTE — REASON FOR VISIT
[Follow-Up Visit] : a follow-up pain management visit [Home] : at home, [unfilled] , at the time of the visit. [Medical Office: ([facility])___] : at the medical office located in  [Patient] : the patient [Self] : self [FreeTextEntry2] : Back pain

## 2023-02-24 NOTE — DISCUSSION/SUMMARY
[Medication Risks Reviewed] : Medication risks reviewed [de-identified] : Prescriptions renewed. [place] and [place]. SOAPP-R completed on chart. The patient's medications are documented to the best of their ability. Quality of life and functional ability improved on medications. The patient is showing no aberrant behavior or evidence of diversion. The patient was advised not to use narcotic medication while operating an automobile or heavy machinery due to potential sedation or dizziness. The patient was educated to the risks associated with potential opioid dependence and addiction. Urine toxicology screens as per office protocol. Use of multimodal analgesia used prn.\par Follow up one month.

## 2023-02-24 NOTE — HISTORY OF PRESENT ILLNESS
[Left Leg] : left leg [Gradual] : gradual [9] : 9 [Sharp] : sharp [Stabbing] : stabbing [Constant] : constant [Household chores] : household chores [Work] : work [Sleep] : sleep [Rest] : rest [Meds] : meds [Walking] : walking [Retired] : Work status: retired [] : Post Surgical Visit: no [FreeTextEntry1] : LEFT FOOT  [FreeTextEntry6] : NUMBNESS

## 2023-03-24 ENCOUNTER — APPOINTMENT (OUTPATIENT)
Dept: PAIN MANAGEMENT | Facility: CLINIC | Age: 63
End: 2023-03-24
Payer: COMMERCIAL

## 2023-03-24 VITALS — HEIGHT: 74 IN | WEIGHT: 200 LBS | BODY MASS INDEX: 25.67 KG/M2

## 2023-03-24 DIAGNOSIS — G89.4 CHRONIC PAIN SYNDROME: ICD-10-CM

## 2023-03-24 PROCEDURE — 99214 OFFICE O/P EST MOD 30 MIN: CPT | Mod: 95

## 2023-03-24 NOTE — REASON FOR VISIT
[Follow-Up Visit] : a follow-up pain management visit [Home] : at home, [unfilled] , at the time of the visit. [Medical Office: ([facility])___] : at the medical office located in  [Patient] : the patient [Self] : self [FreeTextEntry2] : Back pain

## 2023-03-24 NOTE — HISTORY OF PRESENT ILLNESS
[Left Leg] : left leg [Gradual] : gradual [9] : 9 [8] : 8 [Stabbing] : stabbing [Constant] : constant [Household chores] : household chores [Work] : work [Sleep] : sleep [Meds] : meds [Rest] : rest [Walking] : walking [Retired] : Work status: retired [] : Post Surgical Visit: no [FreeTextEntry1] : LEFT FOOT  [de-identified] : 2021 [FreeTextEntry6] : NUMBNESS  [de-identified] : H

## 2023-03-24 NOTE — DISCUSSION/SUMMARY
[Medication Risks Reviewed] : Medication risks reviewed [de-identified] : Prescriptions renewed. [place] and [place]. SOAPP-R completed on chart. The patient's medications are documented to the best of their ability. Quality of life and functional ability improved on medications. The patient is showing no aberrant behavior or evidence of diversion. The patient was advised not to use narcotic medication while operating an automobile or heavy machinery due to potential sedation or dizziness. The patient was educated to the risks associated with potential opioid dependence and addiction. Urine toxicology screens as per office protocol. Use of multimodal analgesia used prn.\par Follow up one month

## 2023-04-07 NOTE — PATIENT PROFILE ADULT - MEDICATIONS/VISITS
Jose Francisco'
no

## 2023-04-24 ENCOUNTER — APPOINTMENT (OUTPATIENT)
Dept: PAIN MANAGEMENT | Facility: CLINIC | Age: 63
End: 2023-04-24
Payer: COMMERCIAL

## 2023-04-24 VITALS — WEIGHT: 200 LBS | BODY MASS INDEX: 25.67 KG/M2 | HEIGHT: 74 IN

## 2023-04-24 PROCEDURE — 99214 OFFICE O/P EST MOD 30 MIN: CPT | Mod: 95

## 2023-04-24 NOTE — DISCUSSION/SUMMARY
[Medication Risks Reviewed] : Medication risks reviewed [de-identified] : Prescriptions renewed. [place] and [place]. SOAPP-R completed on chart. The patient's medications are documented to the best of their ability. Quality of life and functional ability improved on medications. The patient is showing no aberrant behavior or evidence of diversion. The patient was advised not to use narcotic medication while operating an automobile or heavy machinery due to potential sedation or dizziness. The patient was educated to the risks associated with potential opioid dependence and addiction. Urine toxicology screens as per office protocol. Use of multimodal analgesia used prn.\par Please consider this a formal request for a Caudal ABIDA as he has demonstrated greater than 50% sustained pain relief for over three months. \par Spoke to patient about interventional options. Explained risks, benefits and alternatives including but not limited to the risk of infection, bleeding,spinal headache, nerve injury and increased pain. States understanding of above and willing to proceed.\par He will obtain a Plavix letter. \par Follow up one month.\par

## 2023-04-24 NOTE — HISTORY OF PRESENT ILLNESS
[Left Leg] : left leg [Gradual] : gradual [9] : 9 [8] : 8 [Stabbing] : stabbing [Constant] : constant [Household chores] : household chores [Work] : work [Sleep] : sleep [Rest] : rest [Meds] : meds [Walking] : walking [Retired] : Work status: retired [] : Post Surgical Visit: no [FreeTextEntry1] : LEFT FOOT  [FreeTextEntry6] : NUMBNESS  [de-identified] : 2021 [de-identified] : NO HOME EXERCISES/STRETCHES

## 2023-04-24 NOTE — REASON FOR VISIT
[Follow-Up Visit] : a follow-up pain management visit [Home] : at home, [unfilled] , at the time of the visit. [Medical Office: ([facility])___] : at the medical office located in  [Patient] : the patient [Self] : self [FreeTextEntry2] : Back pain

## 2023-05-11 ENCOUNTER — NON-APPOINTMENT (OUTPATIENT)
Age: 63
End: 2023-05-11

## 2023-05-29 RX ORDER — OXYCODONE 10 MG/1
10 TABLET ORAL
Qty: 90 | Refills: 0 | Status: DISCONTINUED | COMMUNITY
Start: 2023-03-24 | End: 2023-05-29

## 2023-05-29 RX ORDER — OXYCODONE 10 MG/1
10 TABLET ORAL EVERY 8 HOURS
Qty: 90 | Refills: 0 | Status: DISCONTINUED | COMMUNITY
End: 2023-05-29

## 2023-05-29 RX ORDER — OXYCODONE 10 MG/1
10 TABLET ORAL
Qty: 90 | Refills: 0 | Status: DISCONTINUED | COMMUNITY
Start: 2023-02-24 | End: 2023-05-29

## 2023-05-30 ENCOUNTER — APPOINTMENT (OUTPATIENT)
Dept: PAIN MANAGEMENT | Facility: CLINIC | Age: 63
End: 2023-05-30
Payer: COMMERCIAL

## 2023-05-30 VITALS — HEIGHT: 74 IN | WEIGHT: 200 LBS | BODY MASS INDEX: 25.67 KG/M2

## 2023-05-30 PROCEDURE — 99214 OFFICE O/P EST MOD 30 MIN: CPT

## 2023-05-30 NOTE — DISCUSSION/SUMMARY
[Medication Risks Reviewed] : Medication risks reviewed [de-identified] : Prescriptions renewed. [place] and [place]. SOAPP-R completed on chart. The patient's medications are documented to the best of their ability. Quality of life and functional ability improved on medications. The patient is showing no aberrant behavior or evidence of diversion. The patient was advised not to use narcotic medication while operating an automobile or heavy machinery due to potential sedation or dizziness. The patient was educated to the risks associated with potential opioid dependence and addiction. Urine toxicology screens as per office protocol. Use of multimodal analgesia used prn.\par He is scheduling a Caudal ABIDA. \par Spoke to patient about interventional options. Explained risks, benefits and alternatives including but not limited to the risk of infection, bleeding,spinal headache, nerve injury and increased pain. States understanding of above and willing to proceed.\par \par \par Follow up one month.

## 2023-05-30 NOTE — HISTORY OF PRESENT ILLNESS
[Left Leg] : left leg [Gradual] : gradual [8] : 8 [Burning] : burning [Tingling] : tingling [Constant] : constant [Household chores] : household chores [Work] : work [Sleep] : sleep [Rest] : rest [Meds] : meds [Walking] : walking [Retired] : Work status: retired [de-identified] : Chronic back pain and radiating left leg pain persists. To schedule a Caudal ABIDA. His wife accompanied him to visit today. Plavix letter on file. Meds effective prn.  [] : Post Surgical Visit: no [FreeTextEntry1] : LEFT FOOT  [FreeTextEntry6] : NUMBNESS  [de-identified] : 2021 [de-identified] : NO HOME EXERCISES/STRETCHES

## 2023-06-08 ENCOUNTER — APPOINTMENT (OUTPATIENT)
Dept: ORTHOPEDIC SURGERY | Facility: HOSPITAL | Age: 63
End: 2023-06-08

## 2023-06-26 ENCOUNTER — APPOINTMENT (OUTPATIENT)
Dept: PAIN MANAGEMENT | Facility: CLINIC | Age: 63
End: 2023-06-26
Payer: COMMERCIAL

## 2023-06-26 VITALS — WEIGHT: 200 LBS | HEIGHT: 74 IN | BODY MASS INDEX: 25.67 KG/M2

## 2023-06-26 PROCEDURE — 99213 OFFICE O/P EST LOW 20 MIN: CPT | Mod: 95

## 2023-06-26 RX ORDER — OXYCODONE 10 MG/1
10 TABLET ORAL
Qty: 90 | Refills: 0 | Status: DISCONTINUED | COMMUNITY
Start: 2023-04-24 | End: 2023-06-26

## 2023-06-26 NOTE — REASON FOR VISIT
[Home] : at home, [unfilled] , at the time of the visit. [Medical Office: ([facility])___] : at the medical office located in  [Patient] : the patient [Self] : self [FreeTextEntry2] : med refill

## 2023-06-26 NOTE — HISTORY OF PRESENT ILLNESS
[Left Leg] : left leg [Gradual] : gradual [8] : 8 [Sharp] : sharp [Stabbing] : stabbing [Tingling] : tingling [Constant] : constant [Household chores] : household chores [Work] : work [Sleep] : sleep [Rest] : rest [Meds] : meds [Walking] : walking [Retired] : Work status: retired [de-identified] : States LESI was cancelled due to  insurance  issue and recently approved. Chronic back and left leg pain. Meds effective prn.  [] : Post Surgical Visit: no [FreeTextEntry1] : LEFT FOOT  [FreeTextEntry6] : NUMBNESS  [de-identified] : 2021 [de-identified] : NO HOME EXERCISES/STRETCHES

## 2023-06-26 NOTE — DISCUSSION/SUMMARY
[Medication Risks Reviewed] : Medication risks reviewed [de-identified] : Prescriptions renewed. [place] and [place]. SOAPP-R completed on chart. The patient's medications are documented to the best of their ability. Quality of life and functional ability improved on medications. The patient is showing no aberrant behavior or evidence of diversion. The patient was advised not to use narcotic medication while operating an automobile or heavy machinery due to potential sedation or dizziness. The patient was educated to the risks associated with potential opioid dependence and addiction. Urine toxicology screens as per office protocol. Use of multimodal analgesia used prn.\par To contact office and reschedule LESI. \par Follow up one month.\par

## 2023-07-12 NOTE — PRE-ANESTHESIA EVALUATION ADULT - RESPIRATORY RATE (BREATHS/MIN)
Assessment:      ICD-10-CM    1. Effusion of subtalar joint  M25.476 triamcinolone (KENALOG-40) injection 40 mg     dexamethasone (DECADRON) injection 4 mg     Bupivacaine 0.5 % Injection     DRAIN/INJECT MEDIUM JOINT/BURSA      2. Acquired pes valgus, left  M21.6X2       3. Plantar fasciitis, left  M72.2              Plan:  Orders Placed This Encounter   Procedures     DRAIN/INJECT MEDIUM JOINT/BURSA       Discussed the etiology and treatment of the condition with the patient.  Imaging studies reviewed and discussed with the patient.  Discussed surgical and conservative options.        -Injection-   Procedure:  injection  PARQ session held, verbal consent obtained.  Sterile skin prep.    Location:  Left STJ  Contents:  3ml total, marcaine, kenalog-40, dexamethasone phosphate.  Dressing applied.    Post-injection instructions reviewed including close attention to amount and duration of pain relief.    Continue prior      MRI or boot discussed if not improved    Return:  No follow-ups on file.                Chief Complaint:     Patient presents with:  Left Foot - Pain, RECHECK         HPI:  Chandrakant Cruz is a 39 year old year old female who presents for evaluation of ankle pain.      7/12  Was nearly full pain relief for a day or so, then has returned  Tried running on it - pain returned  Has stopped running now      6/21  Still painful, has not run yet  Cycling, lifting      Pain location- points to arc underneath ankle on L    STj and TNJ L  No injury but increased mileage training for evly's marathon  Doesn't use orthotics  Asics gel kayano        Past Medical & Surgical History:  Past Medical History:   Diagnosis Date     Acanthamoeba keratitis      ADD (attention deficit disorder with hyperactivity)       Past Surgical History:   Procedure Laterality Date     none        Family History   Problem Relation Age of Onset     Glaucoma Maternal Grandmother      No Known Problems Mother      Diabetes Father       Heart Failure Father      Macular Degeneration No family hx of      Kidney Disease Father      Hypertension Father      Early Death Father      Heart Disease Father      Hyperlipidemia Father      Cancer Paternal Aunt         lymphoma     Cancer Paternal Uncle         bone cancer      Diabetes Maternal Grandfather      Clotting Disorder Maternal Uncle         Social History:  ?  History   Smoking Status     Never   Smokeless Tobacco     Never     History   Drug Use No     Social History    Substance and Sexual Activity      Alcohol use: Not Currently      Allergies:  ?   Allergies   Allergen Reactions     Nkda [No Known Drug Allergy]         Medications:    Current Outpatient Medications   Medication     cetirizine (ZYRTEC) 10 MG tablet     multivitamin w/minerals (MULTI-VITAMIN) tablet     diclofenac (VOLTAREN) 50 MG EC tablet     polyhexamethylene biguanide 0.2mg/mL (BAQUACIL) 0.2mg/ml compounded ophthalmic solution     No current facility-administered medications for this visit.         Physical Exam:  ?  Vitals:  /68   Pulse 75   Wt 53.5 kg (118 lb)   BMI 23.05 kg/m     General:  WD/WN, in NAD.  A&O x3.  Dermatologic:    Skin is intact, open lesions absent.   Skin texture, turgor is normal.  Vascular:  Pulses palpable.  Digital capillary refill time normal.  Skin temperature is normal.  Generalized edema- none.  Focal edema- mild dorsal midfoot over TNJ left.  Neurologic:    Gross sensation normal.  Gait and balance normal.  Musculoskeletal:  Maximal pain to palpation of dorsal medial TNJ, left.  moderate pain to palpation of sinus tarsi, left.  Ankle ROM smooth, nonpainful left.  STJ, ROM full, pain free left.  MTJ ROM mildly painful, L  Muscle strength 5/5  foot and ankle bilateral.    Stance:  RCSP Valgus bilateral.  Foot type:  Flexible valgus        Imaging:   x-ray independently reviewed and interpreted by myself today.  Weight-bearing views left foot dated 05/24/23, reveal moderate flatfoot, no  appreciable coalition, no STJ or MTJ degeneration,  no navicular Fx.  Mild ant ankle impingement on MO.         18

## 2023-08-01 ENCOUNTER — APPOINTMENT (OUTPATIENT)
Dept: PAIN MANAGEMENT | Facility: CLINIC | Age: 63
End: 2023-08-01
Payer: COMMERCIAL

## 2023-08-01 VITALS — HEIGHT: 74 IN | BODY MASS INDEX: 25.67 KG/M2 | WEIGHT: 200 LBS

## 2023-08-01 PROCEDURE — 99213 OFFICE O/P EST LOW 20 MIN: CPT

## 2023-08-01 NOTE — DISCUSSION/SUMMARY
[Medication Risks Reviewed] : Medication risks reviewed [de-identified] : Prescriptions renewed. [place] and [place]. SOAPP-R completed on chart. The patient's medications are documented to the best of their ability. Quality of life and functional ability improved on medications. The patient is showing no aberrant behavior or evidence of diversion. The patient was advised not to use narcotic medication while operating an automobile or heavy machinery due to potential sedation or dizziness. The patient was educated to the risks associated with potential opioid dependence and addiction. Urine toxicology screens as per office protocol. Use of multimodal analgesia used prn. Follow up one month.

## 2023-08-01 NOTE — ASSESSMENT
[FreeTextEntry1] : Alert and oriented  X 3. Since last visit there are no changes to the patient's physical status.

## 2023-08-01 NOTE — HISTORY OF PRESENT ILLNESS
[Left Leg] : left leg [Gradual] : gradual [8] : 8 [Burning] : burning [Stabbing] : stabbing [Constant] : constant [Household chores] : household chores [Work] : work [Sleep] : sleep [Rest] : rest [Meds] : meds [Walking] : walking [Retired] : Work status: retired [de-identified] : Chronic back pain. States busy past month and will call to schedule LESI. Meds effective.  [] : Post Surgical Visit: no [FreeTextEntry6] : NUMBNESS  [FreeTextEntry1] : LEFT FOOT  [de-identified] : 2021 [de-identified] : NO HOME EXERCISES/STRETCHES

## 2023-09-06 ENCOUNTER — APPOINTMENT (OUTPATIENT)
Dept: PAIN MANAGEMENT | Facility: CLINIC | Age: 63
End: 2023-09-06
Payer: COMMERCIAL

## 2023-09-06 VITALS — HEIGHT: 74 IN | WEIGHT: 200 LBS | BODY MASS INDEX: 25.67 KG/M2

## 2023-09-06 PROCEDURE — 99214 OFFICE O/P EST MOD 30 MIN: CPT | Mod: 95

## 2023-09-06 NOTE — REASON FOR VISIT
[Follow-Up Visit] : a follow-up pain management visit [Home] : at home, [unfilled] , at the time of the visit. [Medical Office: ([facility])___] : at the medical office located in  [Patient] : the patient [Self] : self [FreeTextEntry2] : MED REFILL

## 2023-09-06 NOTE — DISCUSSION/SUMMARY
[Medication Risks Reviewed] : Medication risks reviewed [de-identified] : Prescriptions renewed. Opioid [place]/obtained on chart [place]  reviewed and appropriate. SOAPP-R completed on chart. The patient's medications are documented to the best of their ability. Quality of life and functional ability improved on medications. The patient is showing no aberrant behavior or evidence of diversion. The patient was advised not to use narcotic medication while operating an automobile or heavy machinery due to potential sedation or dizziness. The patient was educated to the risks associated with potential opioid dependence and addiction. Urine toxicology screens as per office protocol. Use of multimodal analgesia used prn. To call office, obtain. Plavix letter and schedule Caudal ABIDA.  Spoke to patient about interventional options. Explained risks, benefits and alternatives including but not limited to the risk of infection, bleeding,spinal headache, nerve injury and increased pain. States understanding of above and willing to proceed.   Follow up one month.

## 2023-09-06 NOTE — HISTORY OF PRESENT ILLNESS
[Left Leg] : left leg [Gradual] : gradual [9] : 9 [8] : 8 [Burning] : burning [Localized] : localized [Stabbing] : stabbing [Constant] : constant [Household chores] : household chores [Work] : work [Sleep] : sleep [Rest] : rest [Meds] : meds [Walking] : walking [Retired] : Work status: retired [] : Post Surgical Visit: no [FreeTextEntry1] : LEFT FOOT  [FreeTextEntry6] : NUMBNESS  [de-identified] : 2021 [de-identified] : NO HOME EXERCISES/STRETCHES

## 2023-10-04 ENCOUNTER — APPOINTMENT (OUTPATIENT)
Dept: PAIN MANAGEMENT | Facility: CLINIC | Age: 63
End: 2023-10-04
Payer: COMMERCIAL

## 2023-10-04 VITALS — BODY MASS INDEX: 25.67 KG/M2 | WEIGHT: 200 LBS | HEIGHT: 74 IN

## 2023-10-04 PROCEDURE — 99213 OFFICE O/P EST LOW 20 MIN: CPT | Mod: 95

## 2023-10-13 ENCOUNTER — APPOINTMENT (OUTPATIENT)
Dept: ORTHOPEDIC SURGERY | Facility: CLINIC | Age: 63
End: 2023-10-13
Payer: COMMERCIAL

## 2023-10-13 VITALS — HEIGHT: 74 IN | BODY MASS INDEX: 25.67 KG/M2 | WEIGHT: 200 LBS

## 2023-10-13 PROCEDURE — 73503 X-RAY EXAM HIP UNI 4/> VIEWS: CPT | Mod: RT

## 2023-10-13 PROCEDURE — 99214 OFFICE O/P EST MOD 30 MIN: CPT

## 2023-11-01 ENCOUNTER — APPOINTMENT (OUTPATIENT)
Dept: PAIN MANAGEMENT | Facility: CLINIC | Age: 63
End: 2023-11-01
Payer: COMMERCIAL

## 2023-11-01 PROCEDURE — 99214 OFFICE O/P EST MOD 30 MIN: CPT | Mod: 95

## 2023-11-13 ENCOUNTER — APPOINTMENT (OUTPATIENT)
Dept: ORTHOPEDIC SURGERY | Facility: CLINIC | Age: 63
End: 2023-11-13
Payer: COMMERCIAL

## 2023-11-13 VITALS — WEIGHT: 210 LBS | BODY MASS INDEX: 26.11 KG/M2 | HEIGHT: 75 IN

## 2023-11-13 DIAGNOSIS — M16.11 UNILATERAL PRIMARY OSTEOARTHRITIS, RIGHT HIP: ICD-10-CM

## 2023-11-13 DIAGNOSIS — M87.051 IDIOPATHIC ASEPTIC NECROSIS OF RIGHT FEMUR: ICD-10-CM

## 2023-11-13 PROCEDURE — 99214 OFFICE O/P EST MOD 30 MIN: CPT

## 2023-11-13 RX ORDER — METOPROLOL SUCCINATE 25 MG/1
25 TABLET, EXTENDED RELEASE ORAL
Refills: 0 | Status: ACTIVE | COMMUNITY

## 2023-12-05 ENCOUNTER — APPOINTMENT (OUTPATIENT)
Dept: PAIN MANAGEMENT | Facility: CLINIC | Age: 63
End: 2023-12-05
Payer: COMMERCIAL

## 2023-12-05 VITALS — HEIGHT: 75 IN | BODY MASS INDEX: 26.11 KG/M2 | WEIGHT: 210 LBS

## 2023-12-05 PROCEDURE — 99213 OFFICE O/P EST LOW 20 MIN: CPT

## 2024-01-05 ENCOUNTER — APPOINTMENT (OUTPATIENT)
Dept: PAIN MANAGEMENT | Facility: CLINIC | Age: 64
End: 2024-01-05
Payer: COMMERCIAL

## 2024-01-05 DIAGNOSIS — M25.551 PAIN IN RIGHT HIP: ICD-10-CM

## 2024-01-05 PROCEDURE — 99214 OFFICE O/P EST MOD 30 MIN: CPT | Mod: 95

## 2024-01-05 NOTE — DISCUSSION/SUMMARY
[Medication Risks Reviewed] : Medication risks reviewed [de-identified] : Prescriptions renewed. [place] and [place]. SOAPP-R completed on chart. The patient's medications are documented to the best of their ability. Quality of life and functional ability improved on medications. The patient is showing no aberrant behavior or evidence of diversion. The patient was advised not to use narcotic medication while operating an automobile or heavy machinery due to potential sedation or dizziness. The patient was educated to the risks associated with potential opioid dependence and addiction. Urine toxicology screens as per office protocol. Use of multimodal analgesia used prn. Follow up one month.

## 2024-01-05 NOTE — REASON FOR VISIT
[Follow-Up Visit] : a follow-up pain management visit [Home] : at home, [unfilled] , at the time of the visit. [Medical Office: ([facility])___] : at the medical office located in  [Patient] : the patient [Self] : self [FreeTextEntry2] : MED REFILL

## 2024-01-05 NOTE — HISTORY OF PRESENT ILLNESS
[Left Leg] : left leg [Gradual] : gradual [9] : 9 [8] : 8 [Burning] : burning [Localized] : localized [Sharp] : sharp [Stabbing] : stabbing [Constant] : constant [Household chores] : household chores [Work] : work [Sleep] : sleep [Rest] : rest [Meds] : meds [Walking] : walking [Retired] : Work status: retired [] : Post Surgical Visit: no [FreeTextEntry1] : LEFT FOOT  [FreeTextEntry6] : NUMBNESS IN LEFT FOOT  [de-identified] : 2021 [de-identified] : NO HOME EXERCISES/STRETCHES

## 2024-01-10 NOTE — H&P PST ADULT - BP NONINVASIVE MEAN (MM HG)
Handover Note    Handover given to:  Susu Hirsch [facility]  Date: 1/10/24  Referral Reason: CTN handover to [facility]  CHF pt  Discussed Medications, follow up appointments and other pertinent information/interventions related to case for continued support.    Date of next patient contact: within 5 days     92

## 2024-02-05 ENCOUNTER — APPOINTMENT (OUTPATIENT)
Dept: PAIN MANAGEMENT | Facility: CLINIC | Age: 64
End: 2024-02-05
Payer: COMMERCIAL

## 2024-02-05 PROCEDURE — 99213 OFFICE O/P EST LOW 20 MIN: CPT

## 2024-02-05 NOTE — HISTORY OF PRESENT ILLNESS
[Left Leg] : left leg [Gradual] : gradual [8] : 8 [Burning] : burning [Localized] : localized [Sharp] : sharp [Stabbing] : stabbing [Constant] : constant [Household chores] : household chores [Work] : work [Sleep] : sleep [Rest] : rest [Meds] : meds [Walking] : walking [Retired] : Work status: retired [] : Post Surgical Visit: no [FreeTextEntry1] : LEFT FOOT  [FreeTextEntry6] : NUMBNESS IN LEFT FOOT  [de-identified] : 2021 [de-identified] : NO HOME EXERCISES/STRETCHES

## 2024-02-05 NOTE — REASON FOR VISIT
[Follow-Up Visit] : a follow-up pain management visit [Home] : at home, [unfilled] , at the time of the visit. [Medical Office: ([facility])___] : at the medical office located in  [Patient] : the patient [Self] : self [FreeTextEntry2] :  med refill

## 2024-02-05 NOTE — DISCUSSION/SUMMARY
[Medication Risks Reviewed] : Medication risks reviewed [de-identified] : Prescriptions renewed. Op[place] and appropriate. SOAPP-R completed on chart. The patient's medications are documented to the best of their ability. Quality of life and functional ability improved on medications. The patient is showing no aberrant behavior or evidence of diversion. The patient was advised not to use narcotic medication while operating an automobile or heavy machinery due to potential sedation or dizziness. The patient was educated to the risks associated with potential opioid dependence and addiction. Urine toxicology screens as per office protocol. Use of multimodal analgesia used prn. Follow up one month. show

## 2024-02-07 NOTE — ASU PATIENT PROFILE, ADULT - PATIENT REPRESENTATIVE NAME
Phone: [phone]  [facility]  Outpatient Speech Language Pathology  Fax: [phone]          DAILY TREATMENT NOTE    Date: 2/7/2024  Patient’s Name:  Dennis Wilson  YOB: 2006 (17 y.o.)  Gender:  male  MRN:  [id number]  CSN #: [id number]  Referring physician: Lakesha Espinoza       INSURANCE  SLP Insurance Information: [employer] [place]   Total # of Visits Approved: 30   Total # of Visits to Date: 6   No Show: 0   Canceled Appointment: 4   Total # of Visits Since Initial Evaluation: 93     PAIN  Pain:  No    Pain Rating (0-10 pain scale):  0    SUBJECTIVE  Patient presents to clinic independently. Patient pleasant and cooperative. No new speech concerns reported.     GOALS/ TREATMENT SESSION:  Goal 1: Pt will correctly read Domínguez's seventh hundred sight words with 90% accuracy.   3/3, new words identified []Met  [x]Partially met  []Not met   Goal 2: Pt will read 6th grade level passage (950-1050 Lexile) with >90% accuracy.   Reading 1090 Lexile 7th grade passage with 89% accuracy []Met  [x]Partially met  []Not met   Goal 3: Pt will read 3+ syllable words with 80% accuracy.   Did not address   []Met  []Partially met  []Not met   Goal 4: Pt will correctly spell words with r-controlled pattern with 80% accuracy.   Did not address   []Met  []Partially met  []Not met   Goal 5: Pt will spell words with consonant-le pattern with 80% accuracy.   92% [x]Met  []Partially met  []Not met   Pt will answer comprehension questions about a 6th grade passage with 80% accuracy.   5/6 []Met  [x]Partially met  []Not met       LONG TERM GOALS:  Goal 1: Dennis will read a seventh grade passage with 90% accuracy to improve overall reading fluency.   Goal progressing. See STG data  []Met  [x]Partially met  []Not met   Goal 2: Dennis will answer literal and inferential comprehension questions about a seventh grade level passage with 90% accuracy. Goal progressing. See STG data []Met  [x]Partially met  []Not met 
100.1
danielle vargas(spouse)

## 2024-02-10 NOTE — PATIENT PROFILE ADULT - NSTOBACCO TYPE_GEN_A_CORE_RD
Start Pepcid 20 mg twice a day.  Reviewed food triggers.  Call if persists or worsens and can consider further testing and switch to omeprazole.   Cigarettes

## 2024-03-01 NOTE — H&P PST ADULT - PROBLEM/PLAN-2
[facility] PLASTIC & RECONSTRUCTIVE SURGERY     OPERATIVE DICTATION    NAME: Drew Forrest   MRN: [id number]  DATE: 3/1/2024     AGE: 65 y.o.      LOCATION: [facility]    PREOPERATIVE DIAGNOSIS:  Scalp mass     POSTOPERATIVE DIAGNOSIS:  Same.     OPERATION PERFORMED: 1) Partial excision of posterior scalp mass  (4 x 2 cm)     SURGEON:  Marissa Rooney MD     ANESTHESIA:  Local     ESTIMATED BLOOD LOSS:  Minimal     DRAINS:  None     SPECIMENS: Posterior scalp     OPERATIVE INDICATIONS:  This is a 65 y.o. male who presented to the office in consultation for a growing, posterior scalp mass. He also noted some discomfort and given these issues, he was referred for evaluation.  The patient desired excision and a thorough discussion regarding the risks, benefits, alternatives, outcomes, and personnel involved was performed with the patient.  After all questions were answered to the patient's satisfaction, they agreed to proceed.    OPERATIVE PROCEDURE:  The patient was marked in the preoperative holding area and then brought to the operating room on the eye stretcher. He was placed prone and padded appropriately. The patient was prepped and draped in the usual sterile manner.  A time-out was performed confirming the patient and the procedure to be performed.  The operation commenced by infiltration of local using a 50:50 mixture of 1% lidocaine with epinephrine and 0.5% marcaine plain. An incision over the mass was performed down to thickened tissue. Lidocaine was administered, however he noted to have discomfort and also had significant hypertension during the case. Given this, I spoke with the patient regarding options and he agreed to proceed with aborting the full excision and coming back under anesthesia. The wound was then closed with a running 4-0 Prolene suture. Dressings were then applied.    There were no immediate complications. At the end of the case, all counts were correct.    PLAN: Will obtain  DISPLAY PLAN FREE TEXT

## 2024-03-04 ENCOUNTER — APPOINTMENT (OUTPATIENT)
Dept: PAIN MANAGEMENT | Facility: CLINIC | Age: 64
End: 2024-03-04
Payer: COMMERCIAL

## 2024-03-04 PROCEDURE — 99213 OFFICE O/P EST LOW 20 MIN: CPT

## 2024-03-04 NOTE — HISTORY OF PRESENT ILLNESS
[Left Leg] : left leg [Gradual] : gradual [8] : 8 [Burning] : burning [Localized] : localized [Sharp] : sharp [Stabbing] : stabbing [Constant] : constant [Household chores] : household chores [Work] : work [Sleep] : sleep [Rest] : rest [Meds] : meds [Walking] : walking [Retired] : Work status: retired [] : Post Surgical Visit: no [FreeTextEntry1] : LEFT FOOT  [FreeTextEntry6] : NUMBNESS IN LEFT FOOT  [de-identified] : 2021 [de-identified] : NO HOME EXERCISES/STRETCHES

## 2024-03-04 NOTE — DISCUSSION/SUMMARY
[Medication Risks Reviewed] : Medication risks reviewed [de-identified] : Prescriptions renewed. [place] and [place]. SOAPP-R completed on chart. The patient's medications are documented to the best of their ability. Quality of life and functional ability improved on medications. The patient is showing no aberrant behavior or evidence of diversion. The patient was advised not to use narcotic medication while operating an automobile or heavy machinery due to potential sedation or dizziness. The patient was educated to the risks associated with potential opioid dependence and addiction. Urine toxicology screens as per office protocol. Use of multimodal analgesia used prn. Follow up one month.

## 2024-03-04 NOTE — REASON FOR VISIT
[Follow-Up Visit] : a follow-up pain management visit [Home] : at home, [unfilled] , at the time of the visit. [Medical Office: ([facility])___] : at the medical office located in  [Patient] : the patient [Self] : self [FreeTextEntry2] : MED REFILL

## 2024-03-18 ENCOUNTER — NON-APPOINTMENT (OUTPATIENT)
Age: 64
End: 2024-03-18

## 2024-03-18 ENCOUNTER — APPOINTMENT (OUTPATIENT)
Dept: CARDIOLOGY | Facility: CLINIC | Age: 64
End: 2024-03-18
Payer: COMMERCIAL

## 2024-03-18 VITALS
HEIGHT: 75 IN | SYSTOLIC BLOOD PRESSURE: 130 MMHG | DIASTOLIC BLOOD PRESSURE: 80 MMHG | WEIGHT: 210 LBS | BODY MASS INDEX: 26.11 KG/M2 | HEART RATE: 77 BPM | OXYGEN SATURATION: 98 %

## 2024-03-18 DIAGNOSIS — I77.1 STRICTURE OF ARTERY: ICD-10-CM

## 2024-03-18 DIAGNOSIS — I25.10 ATHEROSCLEROTIC HEART DISEASE OF NATIVE CORONARY ARTERY W/OUT ANGINA PECTORIS: ICD-10-CM

## 2024-03-18 PROCEDURE — 99214 OFFICE O/P EST MOD 30 MIN: CPT

## 2024-03-18 PROCEDURE — G2211 COMPLEX E/M VISIT ADD ON: CPT

## 2024-03-18 PROCEDURE — 93000 ELECTROCARDIOGRAM COMPLETE: CPT

## 2024-03-18 RX ORDER — LIDOCAINE 4 G/100G
4 PATCH TOPICAL
Refills: 0 | Status: DISCONTINUED | COMMUNITY
End: 2024-03-18

## 2024-03-18 RX ORDER — NALOXONE HYDROCHLORIDE 4 MG/.1ML
4 SPRAY NASAL
Qty: 1 | Refills: 0 | Status: DISCONTINUED | COMMUNITY
Start: 2024-03-04 | End: 2024-03-18

## 2024-03-18 RX ORDER — ALPRAZOLAM 0.5 MG/1
0.5 TABLET ORAL
Refills: 0 | Status: DISCONTINUED | COMMUNITY
End: 2024-03-18

## 2024-04-03 ENCOUNTER — APPOINTMENT (OUTPATIENT)
Dept: PAIN MANAGEMENT | Facility: CLINIC | Age: 64
End: 2024-04-03
Payer: COMMERCIAL

## 2024-04-03 PROBLEM — I25.10 ARTERIOSCLEROSIS OF CORONARY ARTERY: Status: ACTIVE | Noted: 2018-12-24

## 2024-04-03 PROBLEM — I77.1 ARTERIAL INSUFFICIENCY: Status: ACTIVE | Noted: 2020-04-17

## 2024-04-03 PROCEDURE — 99213 OFFICE O/P EST LOW 20 MIN: CPT

## 2024-04-03 NOTE — REASON FOR VISIT
[Follow-Up Visit] : a follow-up pain management visit [Home] : at home, [unfilled] , at the time of the visit. [Patient] : the patient [Medical Office: ([facility])___] : at the medical office located in  [Self] : self

## 2024-04-03 NOTE — DISCUSSION/SUMMARY
[Medication Risks Reviewed] : Medication risks reviewed [de-identified] : Prescriptions renewed. [place] and [place]. SOAPP-R completed on chart. The patient's medications are documented to the best of their ability. Quality of life and functional ability improved on medications. The patient is showing no aberrant behavior or evidence of diversion. The patient was advised not to use narcotic medication while operating an automobile or heavy machinery due to potential sedation or dizziness. The patient was educated to the risks associated with potential opioid dependence and addiction. Urine toxicology screens as per office protocol. Use of multimodal analgesia used prn. Follow up one month.

## 2024-04-03 NOTE — PHYSICAL EXAM
[Well Developed] : well developed [Well Nourished] : well nourished [No Acute Distress] : no acute distress [Normal Conjunctiva] : normal conjunctiva [Normal Venous Pressure] : normal venous pressure [No Carotid Bruit] : no carotid bruit [Normal S1, S2] : normal S1, S2 [No Murmur] : no murmur [No Rub] : no rub [No Gallop] : no gallop [Good Air Entry] : good air entry [Clear Lung Fields] : clear lung fields [No Respiratory Distress] : no respiratory distress  [Non Tender] : non-tender [Soft] : abdomen soft [No Masses/organomegaly] : no masses/organomegaly [Normal Bowel Sounds] : normal bowel sounds [Normal Gait] : normal gait [No Edema] : no edema [No Clubbing] : no clubbing [No Cyanosis] : no cyanosis [No Varicosities] : no varicosities [Diminished Pedal Pulses ___] : diminished pedal pulses [unfilled] [No Rash] : no rash [No Skin Lesions] : no skin lesions [Moves all extremities] : moves all extremities [No Focal Deficits] : no focal deficits [Normal Speech] : normal speech [Alert and Oriented] : alert and oriented [Normal memory] : normal memory Valtrex Pregnancy And Lactation Text: this medication is Pregnancy Category B and is considered safe during pregnancy. This medication is not directly found in breast milk but it's metabolite acyclovir is present.

## 2024-04-03 NOTE — HISTORY OF PRESENT ILLNESS
[Left Leg] : left leg [Gradual] : gradual [8] : 8 [Burning] : burning [Localized] : localized [Sharp] : sharp [Stabbing] : stabbing [Constant] : constant [Household chores] : household chores [Work] : work [Sleep] : sleep [Rest] : rest [Meds] : meds [Walking] : walking [Retired] : Work status: retired [] : no [FreeTextEntry6] : NUMBNESS IN LEFT FOOT  [FreeTextEntry1] : LEFT FOOT  [de-identified] : 2021 [FreeTextEntry7] : LT LEG GREATER RT SIDE  [de-identified] : NO HOME EXERCISES/STRETCHES

## 2024-04-03 NOTE — HISTORY OF PRESENT ILLNESS
[FreeTextEntry1] : 63 year old man with CAD, s/p coronary stents, PVD s/p multiple surgeries. He returns for routine follow up.  He has Class 3 claudication, no chest discomfort or dyspnea.

## 2024-04-03 NOTE — DISCUSSION/SUMMARY
[FreeTextEntry1] : Patient with CAD and PVD.  He has claudication, will follow up with vascualar surgeon.  No ischemic symptoms as far as CAD.  Continue atorvastqtn 80 mg, aspirina and clopidogrel. [EKG obtained to assist in diagnosis and management of assessed problem(s)] : EKG obtained to assist in diagnosis and management of assessed problem(s)

## 2024-04-15 NOTE — H&P CARDIOLOGY - BSA (M2)
[facility]  [address]  [facility] [address]  Dept: [phone]    [facility] Urology Office Note - Established    Patient:  Pranav Lo  YOB: 1939  Date: 4/15/2024    The patient is a 84 y.o. male who presents todayfor evaluation of the following problems:   Chief Complaint   Patient presents with    Other     2 month psa + T + eligard(6 month injection); (needs xtandi approval) Romius pt Eligard approval received       HPI  Patient is an 84-year-old male with a history of prostate cancer presenting for follow-up.  He is known to Dr. Keller from the private practice.  His prostate cancer is being treated with ADT.  He has been on Orgovyx.  He tells me that he takes his medication every other day, he is not sure why he takes it every other day versus daily. Testosterone was 6.95, but his PSA had been rising and was up to 0.49 about 2 mos ago. He was started on Xtandi but developed elevated BP and GI discomfort .  He has been off of this since March 18th, however his PSA is now undetectable and testosterone is 12.5..  For now, he continues Orgovyx, every other day.  He had discussed Eligard injection with Dr. Keller, but he is really not interested in switching at this point.  He tells me that he really wants to minimize the amount of medications that he needs to take and to also avoid side effects of the medications.    Summary of old records: N/A    Additional History: N/A    Procedures Today: N/A    Urinalysis today:  No results found for this visit on 04/15/24.  Last several PSA's:  Lab Results   Component Value Date    PSA <0.06 04/09/2024    PSA 0.49 01/29/2024    PSA 0.38 09/26/2023     Last total testosterone:  Lab Results   Component Value Date    TESTOSTERONE 12.5 (L) 04/09/2024       AUA Symptom Score (4/15/2024):  INCOMPLETE EMPTYING: How often have you had the sensation of not emptying your 
Review of Systems   Constitutional:  Negative for chills, fatigue and fever.   Eyes:  Negative for pain, redness and visual disturbance.   Respiratory:  Negative for cough, shortness of breath and wheezing.    Cardiovascular:  Negative for chest pain and leg swelling.   Gastrointestinal:  Negative for abdominal pain, constipation, diarrhea, nausea and vomiting.   Genitourinary:  Negative for difficulty urinating, dysuria, flank pain, frequency, hematuria, scrotal swelling, testicular pain and urgency.   Musculoskeletal:  Negative for back pain, joint swelling and myalgias.   Skin:  Negative for rash and wound.   Neurological:  Negative for dizziness, tremors, weakness and numbness.   Hematological:  Does not bruise/bleed easily.     
2.09

## 2024-04-24 NOTE — H&P PST ADULT - FALL HARM RISK TYPE OF ASSESSMENT
4 Eyes Admission Assessment     I agree as the admission nurse that 2 RN's have performed a thorough Head to Toe Skin Assessment on the patient. ALL assessment sites listed below have been assessed on admission.       Areas assessed by both nurses:   [x]   Head, Face, and Ears   [x]   Shoulders, Back, and Chest  [x]   Arms, Elbows, and Hands   [x]   Coccyx, Sacrum, and Ischum  [x]   Legs, Feet, and Heels        Does the Patient have Skin Breakdown?  No         Germán Prevention initiated:  Yes   Wound Care Orders initiated:  No      [facility] nurse consulted for Pressure Injury (Stage 3,4, Unstageable, DTI, NWPT, and Complex wounds):  No      Nurse 1 eSignature: Electronically signed by Trinidad Gutierrez RN on 4/23/24 at 11:42 PM EDT    **SHARE this note so that the co-signing nurse is able to place an eSignature**    Nurse 2 eSignature: Electronically signed by Oneyda Blue RN on 4/24/24 at 12:59 AM EDT     Admission

## 2024-04-30 ENCOUNTER — APPOINTMENT (OUTPATIENT)
Dept: PAIN MANAGEMENT | Facility: CLINIC | Age: 64
End: 2024-04-30

## 2024-05-07 ENCOUNTER — APPOINTMENT (OUTPATIENT)
Dept: PAIN MANAGEMENT | Facility: CLINIC | Age: 64
End: 2024-05-07
Payer: COMMERCIAL

## 2024-05-07 VITALS — WEIGHT: 220 LBS | BODY MASS INDEX: 27.35 KG/M2 | HEIGHT: 75 IN

## 2024-05-07 PROCEDURE — 99213 OFFICE O/P EST LOW 20 MIN: CPT

## 2024-05-07 NOTE — HISTORY OF PRESENT ILLNESS
[Left Leg] : left leg [Gradual] : gradual [8] : 8 [Burning] : burning [Localized] : localized [Sharp] : sharp [Stabbing] : stabbing [Constant] : constant [Household chores] : household chores [Work] : work [Sleep] : sleep [Rest] : rest [Meds] : meds [Walking] : walking [Retired] : Work status: retired [] : Post Surgical Visit: no [FreeTextEntry1] : LEFT FOOT  [FreeTextEntry6] : NUMBNESS IN LEFT FOOT  [FreeTextEntry7] : LT LEG GREATER RT SIDE  [de-identified] : 2021 [de-identified] : NO HOME EXERCISES/STRETCHES

## 2024-06-03 ENCOUNTER — APPOINTMENT (OUTPATIENT)
Dept: PAIN MANAGEMENT | Facility: CLINIC | Age: 64
End: 2024-06-03
Payer: COMMERCIAL

## 2024-06-03 DIAGNOSIS — M54.16 RADICULOPATHY, LUMBAR REGION: ICD-10-CM

## 2024-06-03 PROCEDURE — 99213 OFFICE O/P EST LOW 20 MIN: CPT

## 2024-06-03 RX ORDER — OXYCODONE 10 MG/1
10 TABLET ORAL
Qty: 90 | Refills: 0 | Status: ACTIVE | COMMUNITY
Start: 2023-05-30 | End: 1900-01-01

## 2024-06-03 NOTE — HISTORY OF PRESENT ILLNESS
[Left Leg] : left leg [Gradual] : gradual [8] : 8 [Burning] : burning [Localized] : localized [Sharp] : sharp [Stabbing] : stabbing [Constant] : constant [Household chores] : household chores [Work] : work [Sleep] : sleep [Rest] : rest [Meds] : meds [Walking] : walking [Retired] : Work status: retired [] : Post Surgical Visit: no [FreeTextEntry1] : LEFT FOOT  [FreeTextEntry6] : NUMBNESS IN LEFT FOOT  [FreeTextEntry7] : LT LEG GREATER RT SIDE  [de-identified] : 2021 [de-identified] : NO HOME EXERCISES/STRETCHES

## 2024-06-03 NOTE — DISCUSSION/SUMMARY
[Medication Risks Reviewed] : Medication risks reviewed [de-identified] : Prescriptions renewed. [place] and [place]. SOAPP-R completed on chart. The patient's medications are documented to the best of their ability. Quality of life and functional ability improved on medications. The patient is showing no aberrant behavior or evidence of diversion. The patient was advised not to use narcotic medication while operating an automobile or heavy machinery due to potential sedation or dizziness. The patient was educated to the risks associated with potential opioid dependence and addiction. Urine toxicology screens as per office protocol. Use of multimodal analgesia used prn. Follow up one month.

## 2024-06-03 NOTE — REASON FOR VISIT
[Follow-Up Visit] : a follow-up pain management visit [Home] : at home, [unfilled] , at the time of the visit. [Medical Office: ([facility])___] : at the medical office located in  [Patient] : the patient [Self] : self

## 2024-06-25 NOTE — DIETITIAN INITIAL EVALUATION ADULT. - ENERGY INTAKE
"  Assessment & Plan       ICD-10-CM    1. Type 2 diabetes mellitus without complication, without long-term current use of insulin (H)  E11.9 FOOT EXAM     metFORMIN (GLUCOPHAGE XR) 500 MG 24 hr tablet    stable, a1c at goal, refilled meds      2. Schizoaffective disorder, bipolar type (H)  F25.0     stable, meds per psychiatry.      3. BMI > 35   - family continues to encourage healthy diet and exercise.  Continue to monitor. E66.01       4. Hypercholesteremia   - Stable E78.00                 BMI  Estimated body mass index is 36.18 kg/m  as calculated from the following:    Height as of this encounter: 1.702 m (5' 7\").    Weight as of this encounter: 104.8 kg (231 lb).   Weight management plan: Discussed healthy diet and exercise guidelines      Patient Instructions   Diabetes looks excellent - no changes to medications today.  Foot exam is normal.  I will be in touch with results of the remainder of her labs.  Follow up with me in October for your annual physical.    Sofya Rowe is a 61 year old, presenting for the following health issues:  RECHECK        6/25/2024     4:31 PM   Additional Questions   Roomed by RACIEL Padilla   Accompanied by Step daugther Isadol         6/25/2024     4:31 PM   Patient Reported Additional Medications   Patient reports taking the following new medications no     History of Present Illness       Diabetes:   She presents for follow up of diabetes.    She is not checking blood glucose.        She is concerned about other.   She is having excessive thirst and weight gain.            She eats 2-3 servings of fruits and vegetables daily.She consumes 1 sweetened beverage(s) daily.She exercises with enough effort to increase her heart rate 30 to 60 minutes per day.  She exercises with enough effort to increase her heart rate 7 days per week.   She is taking medications regularly.               All other systems on a 10-point review are negative, unless otherwise noted in HPI      " "  Objective    /76 (BP Location: Left arm, Patient Position: Sitting, Cuff Size: Adult Large)   Pulse 106   Temp 97.3  F (36.3  C) (Oral)   Resp 16   Ht 1.702 m (5' 7\")   Wt 104.8 kg (231 lb)   SpO2 94%   BMI 36.18 kg/m    Body mass index is 36.18 kg/m .  Physical Exam   GENERAL: alert and no distress  NECK: no adenopathy, no asymmetry, masses, or scars  RESP: lungs clear to auscultation - no rales, rhonchi or wheezes  CV: regular rate and rhythm, normal S1 S2, no S3 or S4, no murmur, click or rub, no peripheral edema  MS: no gross musculoskeletal defects noted, no edema  Diabetic foot exam: normal DP and PT pulses, no trophic changes or ulcerative lesions, and normal sensory exam            Signed Electronically by: Charmaine Velez MD    " Fair (>50%)

## 2024-07-05 ENCOUNTER — APPOINTMENT (OUTPATIENT)
Dept: PAIN MANAGEMENT | Facility: CLINIC | Age: 64
End: 2024-07-05

## 2024-07-05 ENCOUNTER — APPOINTMENT (OUTPATIENT)
Dept: PAIN MANAGEMENT | Facility: CLINIC | Age: 64
End: 2024-07-05
Payer: COMMERCIAL

## 2024-07-05 DIAGNOSIS — M54.16 RADICULOPATHY, LUMBAR REGION: ICD-10-CM

## 2024-07-05 PROCEDURE — 99213 OFFICE O/P EST LOW 20 MIN: CPT

## 2024-08-02 ENCOUNTER — APPOINTMENT (OUTPATIENT)
Dept: PAIN MANAGEMENT | Facility: CLINIC | Age: 64
End: 2024-08-02
Payer: COMMERCIAL

## 2024-08-02 DIAGNOSIS — M54.16 RADICULOPATHY, LUMBAR REGION: ICD-10-CM

## 2024-08-02 PROCEDURE — 99213 OFFICE O/P EST LOW 20 MIN: CPT

## 2024-08-02 NOTE — DISCUSSION/SUMMARY
[Medication Risks Reviewed] : Medication risks reviewed [de-identified] : Prescriptions renewed. [place] and [place]. SOAPP-R completed on chart. The patient's medications are documented to the best of their ability. Quality of life and functional ability improved on medications. The patient is showing no aberrant behavior or evidence of diversion. The patient was advised not to use narcotic medication while operating an automobile or heavy machinery due to potential sedation or dizziness. The patient was educated to the risks associated with potential opioid dependence and addiction. Urine toxicology screens as per office protocol. Use of multimodal analgesia used prn. Follow up one month.

## 2024-08-02 NOTE — REASON FOR VISIT
[Home] : at home, [unfilled] , at the time of the visit. [Medical Office: ([facility])___] : at the medical office located in  [Patient] : the patient [Self] : self

## 2024-08-02 NOTE — HISTORY OF PRESENT ILLNESS
[Left Leg] : left leg [Gradual] : gradual [8] : 8 [5] : 5 [Burning] : burning [Localized] : localized [Sharp] : sharp [Stabbing] : stabbing [Constant] : constant [Household chores] : household chores [Work] : work [Sleep] : sleep [Rest] : rest [Meds] : meds [Walking] : walking [Retired] : Work status: retired [] : Post Surgical Visit: no [FreeTextEntry1] : LEFT FOOT  [FreeTextEntry6] : NUMBNESS IN LEFT FOOT  [FreeTextEntry7] : LT LEG GREATER RT SIDE  [de-identified] : 2021 [de-identified] : NO HOME EXERCISES/STRETCHES

## 2024-08-20 ENCOUNTER — APPOINTMENT (OUTPATIENT)
Dept: PAIN MANAGEMENT | Facility: CLINIC | Age: 64
End: 2024-08-20
Payer: COMMERCIAL

## 2024-08-20 VITALS — BODY MASS INDEX: 27.35 KG/M2 | HEIGHT: 75 IN | WEIGHT: 220 LBS

## 2024-08-20 DIAGNOSIS — M54.16 RADICULOPATHY, LUMBAR REGION: ICD-10-CM

## 2024-08-20 PROCEDURE — 99213 OFFICE O/P EST LOW 20 MIN: CPT

## 2024-08-20 NOTE — DISCUSSION/SUMMARY
[Medication Risks Reviewed] : Medication risks reviewed [de-identified] : Prescriptions renewed. [place] and [place]. SOAPP-R completed on chart. The patient's medications are documented to the best of their ability. Quality of life and functional ability improved on medications. The patient is showing no aberrant behavior or evidence of diversion. The patient was advised not to use narcotic medication while operating an automobile or heavy machinery due to potential sedation or dizziness. The patient was educated to the risks associated with potential opioid dependence and addiction. Urine toxicology screens as per office protocol. Use of multimodal analgesia used prn. Follow up one month.

## 2024-08-20 NOTE — HISTORY OF PRESENT ILLNESS
[Left Leg] : left leg [Gradual] : gradual [8] : 8 [5] : 5 [Burning] : burning [Localized] : localized [Sharp] : sharp [Stabbing] : stabbing [Constant] : constant [Household chores] : household chores [Work] : work [Sleep] : sleep [Rest] : rest [Meds] : meds [Walking] : walking [Retired] : Work status: retired [] : Post Surgical Visit: no [FreeTextEntry1] : LEFT FOOT  [FreeTextEntry6] : NUMBNESS IN LEFT FOOT  [FreeTextEntry7] : LT LEG GREATER RT SIDE  [de-identified] : 2021 [de-identified] : NO HOME EXERCISES/STRETCHES

## 2024-09-05 NOTE — PRE-ANESTHESIA EVALUATION ADULT - NSANTHDIETYNSD_GEN_ALL_CORE
Dr. Sands's NP notified of patient's reaction to contrast during MRI.  Patient skin is flushed and she is nauseous.  Patient is also anxious due to incident. At this time, patient given zofran and valium.  Will continue to monitor.    Yes

## 2024-09-09 NOTE — ED PROVIDER NOTE - NS ED ROS FT
Constitutional: No fever or chills  Eyes: No visual changes, eye pain or redness  HEENT: No throat pain, ear pain, nasal pain. No nose bleeding.  CV: No chest pain or lower extremity edema  Resp: No SOB no cough  GI: No abd pain. No nausea or vomiting. No diarrhea. No constipation.   : No dysuria, hematuria.   MSK: see hpi  Skin: No rash  Neuro: No headache. No numbness or tingling. No weakness.
No

## 2024-09-18 ENCOUNTER — APPOINTMENT (OUTPATIENT)
Dept: PAIN MANAGEMENT | Facility: CLINIC | Age: 64
End: 2024-09-18

## 2024-09-27 ENCOUNTER — APPOINTMENT (OUTPATIENT)
Dept: PAIN MANAGEMENT | Facility: CLINIC | Age: 64
End: 2024-09-27
Payer: COMMERCIAL

## 2024-09-27 DIAGNOSIS — M54.16 RADICULOPATHY, LUMBAR REGION: ICD-10-CM

## 2024-09-27 PROCEDURE — 99212 OFFICE O/P EST SF 10 MIN: CPT

## 2024-09-27 NOTE — HISTORY OF PRESENT ILLNESS
[Lower back] : lower back [Left Leg] : left leg [7] : 7 [Burning] : burning [Tingling] : tingling [Constant] : constant [Leisure] : leisure [Meds] : meds [Walking] : walking [Retired] : Work status: retired [] : This patient has had an injection before: no [FreeTextEntry1] : LEFT FOOT [FreeTextEntry6] : NUMBNESS

## 2024-09-27 NOTE — REASON FOR VISIT
[Follow-Up Visit] : a follow-up pain management visit [Home] : at home, [unfilled] , at the time of the visit. [Medical Office: ([facility])___] : at the medical office located in  [Patient] : the patient [Self] : self

## 2024-09-27 NOTE — DISCUSSION/SUMMARY
Called pt back.  He wanted to know some things before coming in for his consult.  He is expecting a call from a  for a job and wanted to know when his surgery would be and how long he would be off after surgery.  Advised Dr Bolden would need to see him and assess to determine if surgery is even needed before that could be answered.  Generally after inguinal hernia surgery, there is a 4 week 10 lb weight lifting restriction so depending on what type of work that would be the amount of time off of work depends.  He wanted to know if he would be in a lot of pain after surgery. Discussed with him if he has an inguinal hernia, discussed Dr Bolden's no narcotic protocol for post op pain. Other wise if its a different hernia he will prescribe a narcotic for pain but it is generally not needed for a long period of time-usually the first couple of days post op.  He wanted to know if there is anything special he would have to do the day before like his diet, etc.  Explain for surgery he would not be able to eat or drink after midnight and he would be asked to use a special surgical soap the night before surgery.  Discussed need for covid test 48-72 hours prior to surgery and quarantine at home until day of surgery.  Pt wanted to know if he could have the surgery Friday 2/5. Advised that it would first need to be determined if he needs surgery and then Dr Bolden would be able to give him a better idea of when he could schedule surgery. He wanted to know if it could be done on Sat. Advised no it could not.  Reassured pt that he will discuss all of this with Dr Bolden today at his visit at 1:30.  Pt verified clinic address but had the MR address. [facility] clinic address given and directions. Pt doesn't drive but will give them to his friend bringing him.    [Medication Risks Reviewed] : Medication risks reviewed [de-identified] : Prescriptions renewed. Opioid agreement/obtained on chart [place]  reviewed and appropriate. SOAPP-R completed on chart. The patient's medications are documented to the best of their ability. Quality of life and functional ability improved on medications. The patient is showing no aberrant behavior or evidence of diversion. The patient was advised not to use narcotic medication while operating an automobile or heavy machinery due to potential sedation or dizziness. The patient was educated to the risks associated with potential opioid dependence and addiction. Urine toxicology screens as per office protocol. Use of multimodal analgesia used prn. Follow up one month.

## 2024-10-08 ENCOUNTER — INPATIENT (INPATIENT)
Facility: HOSPITAL | Age: 64
LOS: 1 days | Discharge: HOME | DRG: 321 | End: 2024-10-10
Attending: HOSPITALIST | Admitting: HOSPITALIST
Payer: COMMERCIAL

## 2024-10-08 ENCOUNTER — TRANSCRIPTION ENCOUNTER (OUTPATIENT)
Age: 64
End: 2024-10-08

## 2024-10-08 ENCOUNTER — APPOINTMENT (OUTPATIENT)
Dept: CARDIOLOGY | Facility: CLINIC | Age: 64
End: 2024-10-08
Payer: COMMERCIAL

## 2024-10-08 ENCOUNTER — NON-APPOINTMENT (OUTPATIENT)
Age: 64
End: 2024-10-08

## 2024-10-08 VITALS
RESPIRATION RATE: 18 BRPM | SYSTOLIC BLOOD PRESSURE: 94 MMHG | HEIGHT: 74 IN | WEIGHT: 220.02 LBS | DIASTOLIC BLOOD PRESSURE: 61 MMHG | TEMPERATURE: 98 F | OXYGEN SATURATION: 99 % | HEART RATE: 84 BPM

## 2024-10-08 VITALS
HEIGHT: 75 IN | OXYGEN SATURATION: 99 % | SYSTOLIC BLOOD PRESSURE: 137 MMHG | HEART RATE: 81 BPM | WEIGHT: 220 LBS | BODY MASS INDEX: 27.35 KG/M2 | RESPIRATION RATE: 16 BRPM | DIASTOLIC BLOOD PRESSURE: 85 MMHG

## 2024-10-08 DIAGNOSIS — I21.4 NON-ST ELEVATION (NSTEMI) MYOCARDIAL INFARCTION: ICD-10-CM

## 2024-10-08 DIAGNOSIS — Z90.89 ACQUIRED ABSENCE OF OTHER ORGANS: Chronic | ICD-10-CM

## 2024-10-08 DIAGNOSIS — Z98.89 OTHER SPECIFIED POSTPROCEDURAL STATES: Chronic | ICD-10-CM

## 2024-10-08 DIAGNOSIS — I25.10 ATHEROSCLEROTIC HEART DISEASE OF NATIVE CORONARY ARTERY WITHOUT ANGINA PECTORIS: ICD-10-CM

## 2024-10-08 DIAGNOSIS — Z90.49 ACQUIRED ABSENCE OF OTHER SPECIFIED PARTS OF DIGESTIVE TRACT: Chronic | ICD-10-CM

## 2024-10-08 DIAGNOSIS — N18.30 CHRONIC KIDNEY DISEASE, STAGE 3 UNSPECIFIED: ICD-10-CM

## 2024-10-08 DIAGNOSIS — Z98.62 PERIPHERAL VASCULAR ANGIOPLASTY STATUS: Chronic | ICD-10-CM

## 2024-10-08 DIAGNOSIS — Z98.890 OTHER SPECIFIED POSTPROCEDURAL STATES: Chronic | ICD-10-CM

## 2024-10-08 DIAGNOSIS — F17.200 NICOTINE DEPENDENCE, UNSPECIFIED, UNCOMPLICATED: ICD-10-CM

## 2024-10-08 DIAGNOSIS — I50.30 UNSPECIFIED DIASTOLIC (CONGESTIVE) HEART FAILURE: ICD-10-CM

## 2024-10-08 DIAGNOSIS — I73.9 PERIPHERAL VASCULAR DISEASE, UNSPECIFIED: ICD-10-CM

## 2024-10-08 DIAGNOSIS — I50.9 HEART FAILURE, UNSPECIFIED: ICD-10-CM

## 2024-10-08 DIAGNOSIS — Z96.60 PRESENCE OF UNSPECIFIED ORTHOPEDIC JOINT IMPLANT: Chronic | ICD-10-CM

## 2024-10-08 DIAGNOSIS — Z98.61 CORONARY ANGIOPLASTY STATUS: Chronic | ICD-10-CM

## 2024-10-08 PROBLEM — I50.31 ACUTE HEART FAILURE WITH PRESERVED EJECTION FRACTION: Status: ACTIVE | Noted: 2024-10-08

## 2024-10-08 LAB
ADD ON TEST-SPECIMEN IN LAB: SIGNIFICANT CHANGE UP
ALBUMIN SERPL ELPH-MCNC: 4 G/DL — SIGNIFICANT CHANGE UP (ref 3.3–5)
ALBUMIN SERPL ELPH-MCNC: 4.7 G/DL — SIGNIFICANT CHANGE UP (ref 3.3–5)
ALP SERPL-CCNC: 132 U/L — HIGH (ref 40–120)
ALP SERPL-CCNC: 159 U/L — HIGH (ref 40–120)
ALT FLD-CCNC: 20 U/L — SIGNIFICANT CHANGE UP (ref 10–45)
ALT FLD-CCNC: 24 U/L — SIGNIFICANT CHANGE UP (ref 10–45)
ANION GAP SERPL CALC-SCNC: 12 MMOL/L — SIGNIFICANT CHANGE UP (ref 5–17)
ANION GAP SERPL CALC-SCNC: 17 MMOL/L — SIGNIFICANT CHANGE UP (ref 5–17)
APTT BLD: 29.3 SEC — SIGNIFICANT CHANGE UP (ref 24.5–35.6)
AST SERPL-CCNC: 15 U/L — SIGNIFICANT CHANGE UP (ref 10–40)
AST SERPL-CCNC: 18 U/L — SIGNIFICANT CHANGE UP (ref 10–40)
BASOPHILS # BLD AUTO: 0.06 K/UL — SIGNIFICANT CHANGE UP (ref 0–0.2)
BASOPHILS NFR BLD AUTO: 0.5 % — SIGNIFICANT CHANGE UP (ref 0–2)
BILIRUB SERPL-MCNC: 0.8 MG/DL — SIGNIFICANT CHANGE UP (ref 0.2–1.2)
BILIRUB SERPL-MCNC: 1 MG/DL — SIGNIFICANT CHANGE UP (ref 0.2–1.2)
BUN SERPL-MCNC: 26 MG/DL — HIGH (ref 7–23)
BUN SERPL-MCNC: 30 MG/DL — HIGH (ref 7–23)
CALCIUM SERPL-MCNC: 10.4 MG/DL — SIGNIFICANT CHANGE UP (ref 8.4–10.5)
CALCIUM SERPL-MCNC: 9.6 MG/DL — SIGNIFICANT CHANGE UP (ref 8.4–10.5)
CHLORIDE SERPL-SCNC: 89 MMOL/L — LOW (ref 96–108)
CHLORIDE SERPL-SCNC: 95 MMOL/L — LOW (ref 96–108)
CK MB CFR SERPL CALC: 1.3 NG/ML — SIGNIFICANT CHANGE UP (ref 0–6.7)
CK MB CFR SERPL CALC: 1.3 NG/ML — SIGNIFICANT CHANGE UP (ref 0–6.7)
CK MB CFR SERPL CALC: 1.7 NG/ML — SIGNIFICANT CHANGE UP (ref 0–6.7)
CK SERPL-CCNC: 20 U/L — LOW (ref 30–200)
CK SERPL-CCNC: 21 U/L — LOW (ref 30–200)
CK SERPL-CCNC: 25 U/L — LOW (ref 30–200)
CO2 SERPL-SCNC: 27 MMOL/L — SIGNIFICANT CHANGE UP (ref 22–31)
CO2 SERPL-SCNC: 28 MMOL/L — SIGNIFICANT CHANGE UP (ref 22–31)
CREAT SERPL-MCNC: 1.8 MG/DL — HIGH (ref 0.5–1.3)
CREAT SERPL-MCNC: 2 MG/DL — HIGH (ref 0.5–1.3)
CRP SERPL-MCNC: 8 MG/L — HIGH (ref 0–4)
D DIMER BLD IA.RAPID-MCNC: <150 NG/ML DDU — SIGNIFICANT CHANGE UP
EGFR: 37 ML/MIN/1.73M2 — LOW
EGFR: 42 ML/MIN/1.73M2 — LOW
EOSINOPHIL # BLD AUTO: 0.46 K/UL — SIGNIFICANT CHANGE UP (ref 0–0.5)
EOSINOPHIL NFR BLD AUTO: 4.2 % — SIGNIFICANT CHANGE UP (ref 0–6)
GAS PNL BLDV: SIGNIFICANT CHANGE UP
GLUCOSE SERPL-MCNC: 111 MG/DL — HIGH (ref 70–99)
GLUCOSE SERPL-MCNC: 90 MG/DL — SIGNIFICANT CHANGE UP (ref 70–99)
HCT VFR BLD CALC: 51.4 % — HIGH (ref 39–50)
HGB BLD-MCNC: 17.2 G/DL — HIGH (ref 13–17)
IMM GRANULOCYTES NFR BLD AUTO: 0.5 % — SIGNIFICANT CHANGE UP (ref 0–0.9)
INR BLD: 0.97 RATIO — SIGNIFICANT CHANGE UP (ref 0.85–1.16)
LYMPHOCYTES # BLD AUTO: 1.77 K/UL — SIGNIFICANT CHANGE UP (ref 1–3.3)
LYMPHOCYTES # BLD AUTO: 16.2 % — SIGNIFICANT CHANGE UP (ref 13–44)
MCHC RBC-ENTMCNC: 31.9 PG — SIGNIFICANT CHANGE UP (ref 27–34)
MCHC RBC-ENTMCNC: 33.5 GM/DL — SIGNIFICANT CHANGE UP (ref 32–36)
MCV RBC AUTO: 95.4 FL — SIGNIFICANT CHANGE UP (ref 80–100)
MONOCYTES # BLD AUTO: 0.6 K/UL — SIGNIFICANT CHANGE UP (ref 0–0.9)
MONOCYTES NFR BLD AUTO: 5.5 % — SIGNIFICANT CHANGE UP (ref 2–14)
NEUTROPHILS # BLD AUTO: 7.98 K/UL — HIGH (ref 1.8–7.4)
NEUTROPHILS NFR BLD AUTO: 73.1 % — SIGNIFICANT CHANGE UP (ref 43–77)
NRBC # BLD: 0 /100 WBCS — SIGNIFICANT CHANGE UP (ref 0–0)
NT-PROBNP SERPL-SCNC: 1025 PG/ML — HIGH (ref 0–300)
PLATELET # BLD AUTO: 261 K/UL — SIGNIFICANT CHANGE UP (ref 150–400)
POTASSIUM SERPL-MCNC: 3.5 MMOL/L — SIGNIFICANT CHANGE UP (ref 3.5–5.3)
POTASSIUM SERPL-MCNC: 3.8 MMOL/L — SIGNIFICANT CHANGE UP (ref 3.5–5.3)
POTASSIUM SERPL-SCNC: 3.5 MMOL/L — SIGNIFICANT CHANGE UP (ref 3.5–5.3)
POTASSIUM SERPL-SCNC: 3.8 MMOL/L — SIGNIFICANT CHANGE UP (ref 3.5–5.3)
PROT SERPL-MCNC: 7.2 G/DL — SIGNIFICANT CHANGE UP (ref 6–8.3)
PROT SERPL-MCNC: 9.1 G/DL — HIGH (ref 6–8.3)
PROTHROM AB SERPL-ACNC: 11.1 SEC — SIGNIFICANT CHANGE UP (ref 9.9–13.4)
RBC # BLD: 5.39 M/UL — SIGNIFICANT CHANGE UP (ref 4.2–5.8)
RBC # FLD: 13.6 % — SIGNIFICANT CHANGE UP (ref 10.3–14.5)
SODIUM SERPL-SCNC: 133 MMOL/L — LOW (ref 135–145)
SODIUM SERPL-SCNC: 135 MMOL/L — SIGNIFICANT CHANGE UP (ref 135–145)
TROPONIN T, HIGH SENSITIVITY RESULT: 58 NG/L — HIGH (ref 0–51)
TROPONIN T, HIGH SENSITIVITY RESULT: 58 NG/L — HIGH (ref 0–51)
TROPONIN T, HIGH SENSITIVITY RESULT: 74 NG/L — HIGH (ref 0–51)
WBC # BLD: 10.92 K/UL — HIGH (ref 3.8–10.5)
WBC # FLD AUTO: 10.92 K/UL — HIGH (ref 3.8–10.5)

## 2024-10-08 PROCEDURE — 99291 CRITICAL CARE FIRST HOUR: CPT

## 2024-10-08 PROCEDURE — 93000 ELECTROCARDIOGRAM COMPLETE: CPT

## 2024-10-08 PROCEDURE — 99406 BEHAV CHNG SMOKING 3-10 MIN: CPT

## 2024-10-08 PROCEDURE — 99215 OFFICE O/P EST HI 40 MIN: CPT

## 2024-10-08 PROCEDURE — 99223 1ST HOSP IP/OBS HIGH 75: CPT | Mod: 25

## 2024-10-08 PROCEDURE — 71045 X-RAY EXAM CHEST 1 VIEW: CPT | Mod: 26

## 2024-10-08 PROCEDURE — 93308 TTE F-UP OR LMTD: CPT | Mod: 26

## 2024-10-08 PROCEDURE — G2211 COMPLEX E/M VISIT ADD ON: CPT | Mod: NC

## 2024-10-08 RX ORDER — PSYLLIUM HUSK 0.4 G
0 CAPSULE ORAL
Refills: 0 | DISCHARGE

## 2024-10-08 RX ORDER — POTASSIUM CHLORIDE 10 MEQ
10 CAPSULE, EXTENDED RELEASE ORAL DAILY
Refills: 0 | Status: ACTIVE | COMMUNITY

## 2024-10-08 RX ORDER — ACETAMINOPHEN 325 MG
650 TABLET ORAL EVERY 6 HOURS
Refills: 0 | Status: DISCONTINUED | OUTPATIENT
Start: 2024-10-08 | End: 2024-10-10

## 2024-10-08 RX ORDER — 5-HYDROXYTRYPTOPHAN (5-HTP) 100 MG
3 TABLET,DISINTEGRATING ORAL AT BEDTIME
Refills: 0 | Status: DISCONTINUED | OUTPATIENT
Start: 2024-10-08 | End: 2024-10-10

## 2024-10-08 RX ORDER — TICAGRELOR 60 MG/1
180 TABLET ORAL ONCE
Refills: 0 | Status: COMPLETED | OUTPATIENT
Start: 2024-10-08 | End: 2024-10-08

## 2024-10-08 RX ORDER — ATORVASTATIN CALCIUM 10 MG/1
80 TABLET, FILM COATED ORAL AT BEDTIME
Refills: 0 | Status: DISCONTINUED | OUTPATIENT
Start: 2024-10-08 | End: 2024-10-10

## 2024-10-08 RX ORDER — METOPROLOL TARTRATE 50 MG
25 TABLET ORAL
Refills: 0 | Status: DISCONTINUED | OUTPATIENT
Start: 2024-10-08 | End: 2024-10-10

## 2024-10-08 RX ORDER — TORSEMIDE 10 MG/1
2 TABLET ORAL
Refills: 0 | DISCHARGE

## 2024-10-08 RX ORDER — ASPIRIN 325 MG
81 TABLET ORAL DAILY
Refills: 0 | Status: DISCONTINUED | OUTPATIENT
Start: 2024-10-08 | End: 2024-10-10

## 2024-10-08 RX ORDER — OXYCODONE HYDROCHLORIDE 30 MG/1
10 TABLET, FILM COATED, EXTENDED RELEASE ORAL
Refills: 0 | Status: DISCONTINUED | OUTPATIENT
Start: 2024-10-08 | End: 2024-10-10

## 2024-10-08 RX ORDER — PSYLLIUM HUSK 0.4 G
400 CAPSULE ORAL DAILY
Refills: 0 | Status: DISCONTINUED | OUTPATIENT
Start: 2024-10-08 | End: 2024-10-10

## 2024-10-08 RX ORDER — VITAMIN E (DL,TOCOPHERYL ACET) 180 MG
500 CAPSULE ORAL
Refills: 0 | Status: ACTIVE | COMMUNITY

## 2024-10-08 RX ORDER — TORSEMIDE 20 MG/1
20 TABLET ORAL
Refills: 0 | Status: ACTIVE | COMMUNITY

## 2024-10-08 RX ORDER — ASPIRIN 325 MG
243 TABLET ORAL ONCE
Refills: 0 | Status: COMPLETED | OUTPATIENT
Start: 2024-10-08 | End: 2024-10-08

## 2024-10-08 RX ORDER — SODIUM CHLORIDE 0.9 % (FLUSH) 0.9 %
1000 SYRINGE (ML) INJECTION ONCE
Refills: 0 | Status: COMPLETED | OUTPATIENT
Start: 2024-10-08 | End: 2024-10-08

## 2024-10-08 RX ORDER — ONDANSETRON HCL/PF 4 MG/2 ML
4 VIAL (ML) INJECTION ONCE
Refills: 0 | Status: COMPLETED | OUTPATIENT
Start: 2024-10-08 | End: 2024-10-08

## 2024-10-08 RX ORDER — FUROSEMIDE 10 MG/ML
40 INJECTION INTRAVENOUS ONCE
Refills: 0 | Status: COMPLETED | OUTPATIENT
Start: 2024-10-08 | End: 2024-10-08

## 2024-10-08 RX ORDER — FENTANYL CITRATE-0.9 % NACL/PF 300MCG/30
25 PATIENT CONTROLLED ANALGESIA VIAL INJECTION ONCE
Refills: 0 | Status: DISCONTINUED | OUTPATIENT
Start: 2024-10-08 | End: 2024-10-08

## 2024-10-08 RX ADMIN — Medication 25 MICROGRAM(S): at 20:57

## 2024-10-08 RX ADMIN — Medication 40 MILLIEQUIVALENT(S): at 19:16

## 2024-10-08 RX ADMIN — Medication 1000 MILLILITER(S): at 18:25

## 2024-10-08 RX ADMIN — Medication 1000 UNIT(S)/HR: at 18:27

## 2024-10-08 RX ADMIN — TICAGRELOR 180 MILLIGRAM(S): 60 TABLET ORAL at 18:22

## 2024-10-08 RX ADMIN — Medication 243 MILLIGRAM(S): at 18:05

## 2024-10-08 RX ADMIN — Medication 25 MICROGRAM(S): at 18:18

## 2024-10-08 RX ADMIN — Medication 4 MILLIGRAM(S): at 22:24

## 2024-10-08 RX ADMIN — Medication 4000 UNIT(S): at 18:24

## 2024-10-08 NOTE — H&P ADULT - PROBLEM/PLAN-2
DISPLAY PLAN FREE TEXT Island Pedicle Flap Text: The defect edges were debeveled with a #15 scalpel blade.  Given the location of the defect, shape of the defect and the proximity to free margins an island pedicle advancement flap was deemed most appropriate.  Using a sterile surgical marker, an appropriate advancement flap was drawn incorporating the defect, outlining the appropriate donor tissue and placing the expected incisions within the relaxed skin tension lines where possible.    The area thus outlined was incised deep to adipose tissue with a #15 scalpel blade.  The skin margins were undermined to an appropriate distance in all directions around the primary defect and laterally outward around the island pedicle utilizing iris scissors.  There was minimal undermining beneath the pedicle flap.

## 2024-10-08 NOTE — H&P ADULT - HISTORY OF PRESENT ILLNESS
Patient is a 64-year-old male active smoker with a past medical history significant for  CAD s/p stent to LAD  , COPD , chronic back pain , CANDELARIA , , h/o TIA , PAD s/p iliac stent and fem-pop bypass , h/o carotid endarterectomy , Presents for chest pain for the past day.   Patient reports  acute SOB on 10/2, was evaluated at [facility] at the time and reports he was admitted fo Pneumonia , however , left AMA on 10/5/24 and went to [place] for further evaluation. At that time he reports he was admitted for flash pulmonary edema and was recommended for non emergent cardiac cath , but elected to follow up with his Cardiologist and have it done as outpatient. He has since follow up with Dr Pineda( cardiology) and was planned for angiogram. On day of admission he reports sever midsternal chest pain radiating to right shoulder , some associated SOB , no palpitations, some nausea, no vomiting . No cough , no fever or chills.   ED course: Seen by cardiology , given nitro and became hypotensive, given IV fluid bolus and BP improved , started on heparin and loaded with brillinta
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
## 2024-10-08 NOTE — ED PROVIDER NOTE - ATTENDING CONTRIBUTION TO CARE
Attending MD Nadia Barnett:  I personally have seen and examined this patient.  Resident note reviewed and agree on plan of care and except where noted.  See HPI, PE, and MDM for details.

## 2024-10-08 NOTE — H&P ADULT - ASSESSMENT
64M current smoker,   CAD s/p stent to LAD,  chronic back pain , CANDELARIA , TIA , PAD s/p stent and bypass ,p/w chest pain x 1 day

## 2024-10-08 NOTE — ED PROVIDER NOTE - PROGRESS NOTE DETAILS
Attending Nadia Barnett: Initial EKG with concern for Wellens.  Reviewed prior EKGs has new T wave inversions.  As patient has significant chest pain cath consult placed upon arrival. Attending Nadia Barnett: Patient given dose of nitro and became hypotensive.  Given IV fluids with improvement.  Patient seen by cardiology is at the bedside recommended given Brilinta and heparin. Attending Nadia Barnett: pt's chest pain improving. d/w cards will hold off on emergent cath at this time, and likely cath in the am. will continue to monitor with low threshold to reconsult Attending Nadia Barnett: Patient having recurrent chest pain.  EKG performed showing improved T wave inversions.  EKG reviewed by cardiology.  Recommended giving additional fentanyl.  Will continue to monitor.

## 2024-10-08 NOTE — ED ADULT NURSE NOTE - NSFALLRISKFACTORS_ED_ALL_ED
No indicators present
Principal Discharge DX:	Vaginal delivery  Goal:	healthy mother  Assessment and plan of treatment:	nothing in the vagina for 6 weeks, no tampons, no douching, no baths, no sex. Showers are fine.   Go to the emergency room if you have a temperature greater than 100.4, worsening abdominal pain or increased vaginal bleeding

## 2024-10-08 NOTE — ED PROVIDER NOTE - PHYSICAL EXAMINATION
Attending Nadia Barnett: Gen: uncomfortable appaering heent: atrauamtic, eomi, perrla, mmm, op pink, uvula midline, neck; nttp, no nuchal rigidity, chest: nttp, no crepitus, cv: rrr, , lungs: ctab, abd: soft, nontender, nondistended, no peritoneal signs, , no guarding, ext: wwp, neg homans, skin: no rash, neuro: awake and alert, following commands, speech clear, sensation and strength intact, no focal deficits

## 2024-10-08 NOTE — ED ADULT NURSE NOTE - OBJECTIVE STATEMENT
65 yo male presents to the ED with complaints of chest pain x today. Pt aaox4. Pt noted to have recent abnormal echo, was tod he was going to need to have a cardiac cath. Pt as known stent in LAD. Pt states had acute onset of chest pain x today with SOB.  Pt states pain is localized to sternum. Pt Denies headache, dizziness, vision changes, abdominal pain, nausea, vomiting, diarrhea, fevers, chills, dysuria, hematuria, recent illness travel or fall. Pt placed on cardiac monitor, IV access attained, cardiology at bedside

## 2024-10-08 NOTE — ED ADULT TRIAGE NOTE - CHIEF COMPLAINT QUOTE
Pt states that his cardiologist notified him that his stent is failing, pt presenting with central chest pain and SOB. Pt took nitro sublingual x 2 with no relief

## 2024-10-08 NOTE — H&P ADULT - NSHPREVIEWOFSYSTEMS_GEN_ALL_CORE
CONSTITUTIONAL: No weakness, fevers or chills  EYES/ENT: No visual changes;  No dysphagia  NECK: No pain or stiffness  RESPIRATORY: No cough, wheezing, hemoptysis; +  shortness of breath  CARDIOVASCULAR: +  chest pain no  palpitations; No lower extremity edema  EXTREMITIES: no le edema, cyanosis, clubbing  GASTROINTESTINAL: No abdominal or epigastric pain. +  nausea, no  vomiting, or hematemesis; No diarrhea or constipation. No melena or hematochezia.  BACK: No back pain  GENITOURINARY: No dysuria, frequency or hematuria  NEUROLOGICAL: No numbness or weakness  MSK: no joint swelling or pain  SKIN: No itching, burning, rashes, or lesions   PSYCH: no agitation  All other review of systems is negative unless indicated above.

## 2024-10-08 NOTE — ED PROVIDER NOTE - CLINICAL SUMMARY MEDICAL DECISION MAKING FREE TEXT BOX
Attending Nadia Barnett: 64-year-old male multimedical issues including history of significant cardiac disease presented with concern for chest pain.  Upon arrival patient uncomfortable appearing complaining of pain to left side of the chest.  Patient took 2 nitro and 1 baby aspirin.  EKG upon arrival concerning for Wellens.  Patient with stents to LAD.  Patient was scheduled for catheterization with Dr. Gil however was told needs to come into the emergency department if develops chest pain.  Cardiology consulted upon arrival.  Patient given additional aspirin.  Point-of-care ultrasound performed with limited use showing evidence of pericardial effusion and A-line predominant lung fields.  Patient denies any back pain and extremities warm well-perfused making dissection less likely.  Likely pulmonary embolism as patient without any evidence of hypoxia no pleuritic discomfort.  Will discuss with cardiology patient denies any black or bloody stools and denies any recent head trauma.  Cardiology is recommending starting heparin.  Patient will need admission to the hospital.  Likely catheterization.

## 2024-10-08 NOTE — H&P ADULT - PROBLEM SELECTOR PLAN 1
evaluated by cardiology , dynamic Twave changes , chest pain persistent ,  started on heparin infusion , loaded with ticagrelor   - tele   - TTE   - s/p fentanyl x 2 doses , will use Morphine PRN   - Cardiac cath per cards   - trend troponin until peak  - continue heparin infusion   - additional ticagrelor per cards reccs   - c/w ASA   - c/w Statin   - Cardiology consult appreciated, day team to follow up further recommendations evaluated by cardiology , dynamic Twave changes , chest pain persistent ,  started on heparin infusion , loaded with ticagrelor   - tele   - TTE   - s/p fentanyl x 2 doses , can  use Morphine PRN for breakthrough pain  - Cardiac cath per cards   - trend troponin until peak  - continue heparin infusion   - additional ticagrelor per cards reccs   - c/w ASA   - c/w Statin   - Cardiology consult appreciated, day team to follow up further recommendations

## 2024-10-08 NOTE — ED ADULT NURSE REASSESSMENT NOTE - NS ED NURSE REASSESS COMMENT FT1
Report taken from RIOS CRUZ. Pt and family introduced to oncoming RN and updated on plan of care. pt is A&OX4, VSS, Call bell in reach, pt and family educated on use. Bed locked and in lowest position. Denies any needs or complaints at this time. Report taken from RIOS CRUZ. Pt and family introduced to oncoming RN and updated on plan of care. pt is A&OX4, VSS, NSR on CM. on pads, heparin IV infusion going at the rate of 10mL/hr. Call bell in reach, pt and family educated on use. Bed locked and in lowest position. Denies any needs or complaints at this time.

## 2024-10-08 NOTE — ED PROVIDER NOTE - OBJECTIVE STATEMENT
Attending Nadia Barnett: 64-year-old male with history of multiple medical issues including history of significant cardiac disease presenting with concern for chest pain.  Patient states was recently met at outside hospital for a couple of days and was told this shortness of breath could be from his heart.  Patient states approximately 2 hours ago began having severe chest pain.  Pain located in the middle of his chest.  Pain associated with shortness of breath.  Pain is different from when he is required previous stents.  Denies any pain in his back.  No black or bloody stools.  No headaches.

## 2024-10-08 NOTE — ED ADULT NURSE REASSESSMENT NOTE - NS ED NURSE REASSESS COMMENT FT1
MD Coombs aware heparin bolus order is 4000 units despite nomogram saying 5000 units. Ok to push 4000 units as per MD

## 2024-10-08 NOTE — H&P ADULT - NSHPPHYSICALEXAM_GEN_ALL_CORE
Vital Signs Last 24 Hrs  T(C): 36.6 (08 Oct 2024 17:55), Max: 36.6 (08 Oct 2024 17:55)  T(F): 97.9 (08 Oct 2024 17:55), Max: 97.9 (08 Oct 2024 17:55)  HR: 77 (08 Oct 2024 19:30) (69 - 88)  BP: 155/92 (08 Oct 2024 19:30) (94/61 - 161/71)  BP(mean): --  RR: 18 (08 Oct 2024 19:30) (16 - 18)  SpO2: 98% (08 Oct 2024 19:30) (97% - 99%)    Parameters below as of 08 Oct 2024 19:30  Patient On (Oxygen Delivery Method): room air Vital Signs Last 24 Hrs  T(C): 36.6 (08 Oct 2024 17:55), Max: 36.6 (08 Oct 2024 17:55)  T(F): 97.9 (08 Oct 2024 17:55), Max: 97.9 (08 Oct 2024 17:55)  HR: 77 (08 Oct 2024 19:30) (69 - 88)  BP: 155/92 (08 Oct 2024 19:30) (94/61 - 161/71)  BP(mean): --  RR: 18 (08 Oct 2024 19:30) (16 - 18)  SpO2: 98% (08 Oct 2024 19:30) (97% - 99%)    Parameters below as of 08 Oct 2024 19:30  Patient On (Oxygen Delivery Method): room air    GENERAL:  in significant  distress from pain , well-developed  HEAD:  Atraumatic, Normocephalic  ENT: EOMI, PERRLA, conjunctiva and sclera clear,  moist mucosa no pharyngeal erythema or exudates   NECK: supple , no JVD   CHEST/LUNG: Clear to auscultation bilaterally; No wheeze, equal breath sounds bilaterally   BACK: No spinal tenderness,  No CVA tenderness   HEART: Regular rate and rhythm; No murmurs, rubs, or gallops  ABDOMEN: Soft, Nontender, Nondistended; Bowel sounds present  EXTREMITIES:  No clubbing, cyanosis, trace edema  MSK: No joint swelling or effusions, ROM intact   PSYCH: Normal behavior/affect  NEUROLOGY: AAOx3, non-focal, cranial nerves intact  SKIN: Normal color, No rashes or lesions

## 2024-10-08 NOTE — H&P ADULT - PROBLEM SELECTOR PLAN 4
Scr 2 , no recent baseline for comparison   -renal dose medications  - monitor ins and outs  - monitor creatinine   - avoid nephrotoxins

## 2024-10-08 NOTE — DISCUSSION/SUMMARY
[FreeTextEntry1] : 64 year old man with known CAD and PAD.  Presents with NSTEMI, acute heart failure and echo and EKG indication of LAD involvement.  Urgent cardiac cath indicated.  Arranged with Dr. Chicho Boone at [facility]. [EKG obtained to assist in diagnosis and management of assessed problem(s)] : EKG obtained to assist in diagnosis and management of assessed problem(s)

## 2024-10-08 NOTE — H&P ADULT - NSHPLABSRESULTS_GEN_ALL_CORE
Labs personally reviewed:                          17.2   10.92 )-----------( 261      ( 08 Oct 2024 18:09 )             51.4     10-08    133[L]  |  89[L]  |  26[H]  ----------------------------<  90  3.5   |  27  |  2.00[H]    Ca    10.4      08 Oct 2024 18:09    TPro  9.1[H]  /  Alb  4.7  /  TBili  0.8  /  DBili  x   /  AST  18  /  ALT  24  /  AlkPhos  159[H]  10-08    CARDIAC MARKERS ( 08 Oct 2024 18:09 )  x     / x     / x     / x     / 1.7 ng/mL      LIVER FUNCTIONS - ( 08 Oct 2024 18:09 )  Alb: 4.7 g/dL / Pro: 9.1 g/dL / ALK PHOS: 159 U/L / ALT: 24 U/L / AST: 18 U/L / GGT: x           PT/INR - ( 08 Oct 2024 18:09 )   PT: 11.1 sec;   INR: 0.97 ratio         PTT - ( 08 Oct 2024 18:09 )  PTT:29.3 sec  Urinalysis Basic - ( 08 Oct 2024 18:09 )    Color: x / Appearance: x / SG: x / pH: x  Gluc: 90 mg/dL / Ketone: x  / Bili: x / Urobili: x   Blood: x / Protein: x / Nitrite: x   Leuk Esterase: x / RBC: x / WBC x   Sq Epi: x / Non Sq Epi: x / Bacteria: x      CAPILLARY BLOOD GLUCOSE          Imaging:  CXR personally reviewed: no focal opacity    EKG personally reviewed: Labs personally reviewed:                          17.2   10.92 )-----------( 261      ( 08 Oct 2024 18:09 )             51.4     10-08    133[L]  |  89[L]  |  26[H]  ----------------------------<  90  3.5   |  27  |  2.00[H]    Ca    10.4      08 Oct 2024 18:09    TPro  9.1[H]  /  Alb  4.7  /  TBili  0.8  /  DBili  x   /  AST  18  /  ALT  24  /  AlkPhos  159[H]  10-08    CARDIAC MARKERS ( 08 Oct 2024 18:09 )  x     / x     / x     / x     / 1.7 ng/mL      LIVER FUNCTIONS - ( 08 Oct 2024 18:09 )  Alb: 4.7 g/dL / Pro: 9.1 g/dL / ALK PHOS: 159 U/L / ALT: 24 U/L / AST: 18 U/L / GGT: x           PT/INR - ( 08 Oct 2024 18:09 )   PT: 11.1 sec;   INR: 0.97 ratio         PTT - ( 08 Oct 2024 18:09 )  PTT:29.3 sec  Urinalysis Basic - ( 08 Oct 2024 18:09 )    Color: x / Appearance: x / SG: x / pH: x  Gluc: 90 mg/dL / Ketone: x  / Bili: x / Urobili: x   Blood: x / Protein: x / Nitrite: x   Leuk Esterase: x / RBC: x / WBC x   Sq Epi: x / Non Sq Epi: x / Bacteria: x      CAPILLARY BLOOD GLUCOSE          Imaging:  CXR personally reviewed: no focal opacity    EKG personally reviewed: nsr at 81 bp , TWI in III , V2 QTc 490

## 2024-10-09 ENCOUNTER — TRANSCRIPTION ENCOUNTER (OUTPATIENT)
Age: 64
End: 2024-10-09

## 2024-10-09 LAB
A1C WITH ESTIMATED AVERAGE GLUCOSE RESULT: 5 % — SIGNIFICANT CHANGE UP (ref 4–5.6)
ALBUMIN SERPL ELPH-MCNC: 3.9 G/DL — SIGNIFICANT CHANGE UP (ref 3.3–5)
ALP SERPL-CCNC: 130 U/L — HIGH (ref 40–120)
ALT FLD-CCNC: 18 U/L — SIGNIFICANT CHANGE UP (ref 10–45)
ANION GAP SERPL CALC-SCNC: 12 MMOL/L — SIGNIFICANT CHANGE UP (ref 5–17)
ANION GAP SERPL CALC-SCNC: 13 MMOL/L — SIGNIFICANT CHANGE UP (ref 5–17)
APTT BLD: 35.7 SEC — HIGH (ref 24.5–35.6)
APTT BLD: 36.4 SEC — HIGH (ref 24.5–35.6)
AST SERPL-CCNC: 15 U/L — SIGNIFICANT CHANGE UP (ref 10–40)
BILIRUB SERPL-MCNC: 1.1 MG/DL — SIGNIFICANT CHANGE UP (ref 0.2–1.2)
BUN SERPL-MCNC: 29 MG/DL — HIGH (ref 7–23)
BUN SERPL-MCNC: 30 MG/DL — HIGH (ref 7–23)
CALCIUM SERPL-MCNC: 9.3 MG/DL — SIGNIFICANT CHANGE UP (ref 8.4–10.5)
CALCIUM SERPL-MCNC: 9.6 MG/DL — SIGNIFICANT CHANGE UP (ref 8.4–10.5)
CHLORIDE SERPL-SCNC: 95 MMOL/L — LOW (ref 96–108)
CHLORIDE SERPL-SCNC: 96 MMOL/L — SIGNIFICANT CHANGE UP (ref 96–108)
CHOLEST SERPL-MCNC: 113 MG/DL — SIGNIFICANT CHANGE UP
CO2 SERPL-SCNC: 26 MMOL/L — SIGNIFICANT CHANGE UP (ref 22–31)
CO2 SERPL-SCNC: 27 MMOL/L — SIGNIFICANT CHANGE UP (ref 22–31)
CREAT SERPL-MCNC: 1.59 MG/DL — HIGH (ref 0.5–1.3)
CREAT SERPL-MCNC: 1.74 MG/DL — HIGH (ref 0.5–1.3)
EGFR: 44 ML/MIN/1.73M2 — LOW
EGFR: 48 ML/MIN/1.73M2 — LOW
ESTIMATED AVERAGE GLUCOSE: 97 MG/DL — SIGNIFICANT CHANGE UP (ref 68–114)
GLUCOSE BLDC GLUCOMTR-MCNC: 88 MG/DL — SIGNIFICANT CHANGE UP (ref 70–99)
GLUCOSE SERPL-MCNC: 104 MG/DL — HIGH (ref 70–99)
GLUCOSE SERPL-MCNC: 94 MG/DL — SIGNIFICANT CHANGE UP (ref 70–99)
HCT VFR BLD CALC: 42.4 % — SIGNIFICANT CHANGE UP (ref 39–50)
HCT VFR BLD CALC: 43.5 % — SIGNIFICANT CHANGE UP (ref 39–50)
HDLC SERPL-MCNC: 32 MG/DL — LOW
HGB BLD-MCNC: 14.3 G/DL — SIGNIFICANT CHANGE UP (ref 13–17)
HGB BLD-MCNC: 14.7 G/DL — SIGNIFICANT CHANGE UP (ref 13–17)
LACTATE SERPL-SCNC: 1 MMOL/L — SIGNIFICANT CHANGE UP (ref 0.5–2)
LACTATE SERPL-SCNC: 1.3 MMOL/L — SIGNIFICANT CHANGE UP (ref 0.5–2)
LIPID PNL WITH DIRECT LDL SERPL: 59 MG/DL — SIGNIFICANT CHANGE UP
MCHC RBC-ENTMCNC: 31.4 PG — SIGNIFICANT CHANGE UP (ref 27–34)
MCHC RBC-ENTMCNC: 32 PG — SIGNIFICANT CHANGE UP (ref 27–34)
MCHC RBC-ENTMCNC: 33.7 GM/DL — SIGNIFICANT CHANGE UP (ref 32–36)
MCHC RBC-ENTMCNC: 33.8 GM/DL — SIGNIFICANT CHANGE UP (ref 32–36)
MCV RBC AUTO: 93 FL — SIGNIFICANT CHANGE UP (ref 80–100)
MCV RBC AUTO: 94.8 FL — SIGNIFICANT CHANGE UP (ref 80–100)
NON HDL CHOLESTEROL: 81 MG/DL — SIGNIFICANT CHANGE UP
NRBC # BLD: 0 /100 WBCS — SIGNIFICANT CHANGE UP (ref 0–0)
NRBC # BLD: 0 /100 WBCS — SIGNIFICANT CHANGE UP (ref 0–0)
PLATELET # BLD AUTO: 193 K/UL — SIGNIFICANT CHANGE UP (ref 150–400)
PLATELET # BLD AUTO: 204 K/UL — SIGNIFICANT CHANGE UP (ref 150–400)
POTASSIUM SERPL-MCNC: 3.9 MMOL/L — SIGNIFICANT CHANGE UP (ref 3.5–5.3)
POTASSIUM SERPL-MCNC: 4.1 MMOL/L — SIGNIFICANT CHANGE UP (ref 3.5–5.3)
POTASSIUM SERPL-SCNC: 3.9 MMOL/L — SIGNIFICANT CHANGE UP (ref 3.5–5.3)
POTASSIUM SERPL-SCNC: 4.1 MMOL/L — SIGNIFICANT CHANGE UP (ref 3.5–5.3)
PROT SERPL-MCNC: 7.3 G/DL — SIGNIFICANT CHANGE UP (ref 6–8.3)
RBC # BLD: 4.56 M/UL — SIGNIFICANT CHANGE UP (ref 4.2–5.8)
RBC # BLD: 4.59 M/UL — SIGNIFICANT CHANGE UP (ref 4.2–5.8)
RBC # FLD: 13.7 % — SIGNIFICANT CHANGE UP (ref 10.3–14.5)
RBC # FLD: 13.8 % — SIGNIFICANT CHANGE UP (ref 10.3–14.5)
SODIUM SERPL-SCNC: 134 MMOL/L — LOW (ref 135–145)
SODIUM SERPL-SCNC: 135 MMOL/L — SIGNIFICANT CHANGE UP (ref 135–145)
TRIGL SERPL-MCNC: 121 MG/DL — SIGNIFICANT CHANGE UP
TROPONIN T, HIGH SENSITIVITY RESULT: 52 NG/L — HIGH (ref 0–51)
WBC # BLD: 11.06 K/UL — HIGH (ref 3.8–10.5)
WBC # BLD: 9.23 K/UL — SIGNIFICANT CHANGE UP (ref 3.8–10.5)
WBC # FLD AUTO: 11.06 K/UL — HIGH (ref 3.8–10.5)
WBC # FLD AUTO: 9.23 K/UL — SIGNIFICANT CHANGE UP (ref 3.8–10.5)

## 2024-10-09 PROCEDURE — 99152 MOD SED SAME PHYS/QHP 5/>YRS: CPT

## 2024-10-09 PROCEDURE — 99232 SBSQ HOSP IP/OBS MODERATE 35: CPT

## 2024-10-09 PROCEDURE — 92928 PRQ TCAT PLMT NTRAC ST 1 LES: CPT | Mod: RC

## 2024-10-09 PROCEDURE — 93010 ELECTROCARDIOGRAM REPORT: CPT

## 2024-10-09 PROCEDURE — 93454 CORONARY ARTERY ANGIO S&I: CPT | Mod: 26,59

## 2024-10-09 PROCEDURE — 99223 1ST HOSP IP/OBS HIGH 75: CPT

## 2024-10-09 RX ORDER — MAG HYDROX/ALUMINUM HYD/SIMETH 200-200-20
30 SUSPENSION, ORAL (FINAL DOSE FORM) ORAL EVERY 4 HOURS
Refills: 0 | Status: DISCONTINUED | OUTPATIENT
Start: 2024-10-09 | End: 2024-10-10

## 2024-10-09 RX ORDER — INFLUENZA VIRUS VACCINE 15; 15; 15; 15 UG/.5ML; UG/.5ML; UG/.5ML; UG/.5ML
0.5 SUSPENSION INTRAMUSCULAR ONCE
Refills: 0 | Status: DISCONTINUED | OUTPATIENT
Start: 2024-10-09 | End: 2024-10-10

## 2024-10-09 RX ORDER — MORPHINE SULFATE 30 MG/1
2 TABLET, FILM COATED, EXTENDED RELEASE ORAL EVERY 6 HOURS
Refills: 0 | Status: DISCONTINUED | OUTPATIENT
Start: 2024-10-09 | End: 2024-10-09

## 2024-10-09 RX ORDER — SODIUM CHLORIDE 0.9 % (FLUSH) 0.9 %
250 SYRINGE (ML) INJECTION ONCE
Refills: 0 | Status: COMPLETED | OUTPATIENT
Start: 2024-10-09 | End: 2024-10-09

## 2024-10-09 RX ORDER — SODIUM CHLORIDE 0.9 % (FLUSH) 0.9 %
1000 SYRINGE (ML) INJECTION
Refills: 0 | Status: DISCONTINUED | OUTPATIENT
Start: 2024-10-09 | End: 2024-10-10

## 2024-10-09 RX ORDER — FENTANYL CITRATE-0.9 % NACL/PF 300MCG/30
50 PATIENT CONTROLLED ANALGESIA VIAL INJECTION ONCE
Refills: 0 | Status: DISCONTINUED | OUTPATIENT
Start: 2024-10-09 | End: 2024-10-09

## 2024-10-09 RX ORDER — MAG HYDROX/ALUMINUM HYD/SIMETH 200-200-20
30 SUSPENSION, ORAL (FINAL DOSE FORM) ORAL ONCE
Refills: 0 | Status: DISCONTINUED | OUTPATIENT
Start: 2024-10-09 | End: 2024-10-09

## 2024-10-09 RX ORDER — TICAGRELOR 60 MG/1
90 TABLET ORAL
Refills: 0 | Status: DISCONTINUED | OUTPATIENT
Start: 2024-10-10 | End: 2024-10-10

## 2024-10-09 RX ORDER — SODIUM CHLORIDE 0.9 % (FLUSH) 0.9 %
1000 SYRINGE (ML) INJECTION
Refills: 0 | Status: DISCONTINUED | OUTPATIENT
Start: 2024-10-09 | End: 2024-10-09

## 2024-10-09 RX ORDER — TICAGRELOR 60 MG/1
1 TABLET ORAL
Qty: 60 | Refills: 0
Start: 2024-10-09 | End: 2024-11-07

## 2024-10-09 RX ORDER — FENTANYL CITRATE-0.9 % NACL/PF 300MCG/30
25 PATIENT CONTROLLED ANALGESIA VIAL INJECTION ONCE
Refills: 0 | Status: DISCONTINUED | OUTPATIENT
Start: 2024-10-09 | End: 2024-10-09

## 2024-10-09 RX ORDER — FAMOTIDINE 40 MG
20 TABLET ORAL ONCE
Refills: 0 | Status: DISCONTINUED | OUTPATIENT
Start: 2024-10-09 | End: 2024-10-09

## 2024-10-09 RX ORDER — FAMOTIDINE 40 MG
40 TABLET ORAL ONCE
Refills: 0 | Status: DISCONTINUED | OUTPATIENT
Start: 2024-10-09 | End: 2024-10-10

## 2024-10-09 RX ORDER — HYDROMORPHONE HYDROCHLORIDE 1 MG/ML
1 INJECTION, SOLUTION INTRAMUSCULAR; INTRAVENOUS; SUBCUTANEOUS ONCE
Refills: 0 | Status: DISCONTINUED | OUTPATIENT
Start: 2024-10-09 | End: 2024-10-09

## 2024-10-09 RX ADMIN — OXYCODONE HYDROCHLORIDE 10 MILLIGRAM(S): 30 TABLET, FILM COATED, EXTENDED RELEASE ORAL at 07:33

## 2024-10-09 RX ADMIN — Medication 400 MILLIGRAM(S): at 18:50

## 2024-10-09 RX ADMIN — Medication 1000 UNIT(S)/HR: at 00:58

## 2024-10-09 RX ADMIN — FUROSEMIDE 40 MILLIGRAM(S): 10 INJECTION INTRAVENOUS at 01:09

## 2024-10-09 RX ADMIN — Medication 50 MICROGRAM(S): at 15:53

## 2024-10-09 RX ADMIN — OXYCODONE HYDROCHLORIDE 10 MILLIGRAM(S): 30 TABLET, FILM COATED, EXTENDED RELEASE ORAL at 05:58

## 2024-10-09 RX ADMIN — Medication 1 PATCH: at 18:54

## 2024-10-09 RX ADMIN — Medication 75 MILLILITER(S): at 12:28

## 2024-10-09 RX ADMIN — Medication 30 MILLILITER(S): at 01:16

## 2024-10-09 RX ADMIN — HYDROMORPHONE HYDROCHLORIDE 1 MILLIGRAM(S): 1 INJECTION, SOLUTION INTRAMUSCULAR; INTRAVENOUS; SUBCUTANEOUS at 02:17

## 2024-10-09 RX ADMIN — Medication 25 MICROGRAM(S): at 16:19

## 2024-10-09 RX ADMIN — Medication 25 MILLIGRAM(S): at 05:58

## 2024-10-09 RX ADMIN — Medication 1 PATCH: at 19:33

## 2024-10-09 RX ADMIN — ATORVASTATIN CALCIUM 80 MILLIGRAM(S): 10 TABLET, FILM COATED ORAL at 21:17

## 2024-10-09 RX ADMIN — Medication 50 MICROGRAM(S): at 14:32

## 2024-10-09 RX ADMIN — Medication 4000 UNIT(S): at 02:41

## 2024-10-09 RX ADMIN — OXYCODONE HYDROCHLORIDE 10 MILLIGRAM(S): 30 TABLET, FILM COATED, EXTENDED RELEASE ORAL at 19:01

## 2024-10-09 RX ADMIN — Medication 1300 UNIT(S)/HR: at 02:38

## 2024-10-09 RX ADMIN — Medication 500 MILLILITER(S): at 12:29

## 2024-10-09 RX ADMIN — Medication 3 MILLIGRAM(S): at 23:37

## 2024-10-09 RX ADMIN — Medication 25 MILLIGRAM(S): at 18:50

## 2024-10-09 RX ADMIN — Medication 81 MILLIGRAM(S): at 11:35

## 2024-10-09 RX ADMIN — HYDROMORPHONE HYDROCHLORIDE 1 MILLIGRAM(S): 1 INJECTION, SOLUTION INTRAMUSCULAR; INTRAVENOUS; SUBCUTANEOUS at 01:16

## 2024-10-09 RX ADMIN — Medication 75 MILLILITER(S): at 15:11

## 2024-10-09 NOTE — CHART NOTE - NSCHARTNOTEFT_GEN_A_CORE
CC: Notified by RN, pt with Chest pain.   Also called by House Cards informed to give Maalox, Pepcid and either Morphine or Dilaudid for Abdominal pain.   Pt seen at bedside, calm but verbalized discomfort, labs were drawn at time and 12 lead EKG done     HPI: 64M current smoker, CAD s/p stent to LAD, chronic back pain, CANDELARIA , TIA , PAD s/p stent and bypass ,p/w chest pain x 1 day in setting of NSTEMI, trop 74, 58 ECG with dynamic T wave changes, started on heparin gtt, loaded with Brilinta, and ASA cards following, planning for possible cath, but pending CT a/p for further eval.   Pt now admitted to floor from ER, verbalizing ongoing Chest pain and abd discomfort. Pt seen by House Cards requesting treatment for abdominal discomfort and GERD.     Vital Signs Last 24 Hrs  T(C): 36.7 (08 Oct 2024 19:30), Max: 36.7 (08 Oct 2024 19:30)  T(F): 98 (08 Oct 2024 19:30), Max: 98 (08 Oct 2024 19:30)  HR: 85 (08 Oct 2024 22:10) (69 - 88)  BP: 114/96 (08 Oct 2024 22:10) (94/61 - 161/71)  BP(mean): 100 (08 Oct 2024 22:10) (100 - 100)  RR: 18 (08 Oct 2024 22:10) (16 - 18)  SpO2: 98% (08 Oct 2024 22:10) (97% - 99%)    Parameters below as of 08 Oct 2024 22:10  Patient On (Oxygen Delivery Method): room air        Labs:                          17.2   10.92 )-----------( 261      ( 08 Oct 2024 18:09 )             51.4     10-08    135  |  95[L]  |  30[H]  ----------------------------<  111[H]  3.8   |  28  |  1.80[H]    Ca    9.6      08 Oct 2024 23:32  Mg     2.0     10-08    TPro  7.2  /  Alb  4.0  /  TBili  1.0  /  DBili  x   /  AST  15  /  ALT  20  /  AlkPhos  132[H]  10-08    CARDIAC MARKERS ( 08 Oct 2024 23:32 )  x     / x     / x     / x     / 1.3 ng/mL  CARDIAC MARKERS ( 08 Oct 2024 21:02 )  x     / x     / x     / x     / 1.3 ng/mL  CARDIAC MARKERS ( 08 Oct 2024 18:09 )  x     / x     / x     / x     / 1.7 ng/mL    Troponin T, High Sensitivity (10.08.24 @ 23:32)   Troponin T, High Sensitivity Result: 58: * Blood Gas Venous - Lactate (10.08.24 @ 23:26)   Blood Gas Venous - Lactate: 1.4 mmol/    Troponin T, High Sensitivity (10.08.24 @ 21:02)   Troponin T, High Sensitivity Result: 58: *     Creatine Kinase (10.08.24 @ 21:02)   Creatine Kinase: 21 U/L    Creatine Kinase (10.08.24 @ 21:02)   Creatine Kinase: 21 U/L          Radiology:        Physical Exam:  General: WN/WD, NAD, nontoxic appearing  Neurology: A&Ox3, nonfocal, WATSON x 4  Head:  Normocephalic, atraumatic  Respiratory: CTA B/L  CV: RRR, S1S2, no murmur  Abdominal: Soft, NT, ND no palpable mass  MSK: No edema, + peripheral pulses, FROM all 4 extremity    Assessment & Plan:  HPI:  Patient is a 64-year-old male active smoker with a past medical history significant for  CAD s/p stent to LAD  , COPD , chronic back pain , CANDELARIA , , h/o TIA , PAD s/p iliac stent and fem-pop bypass , h/o carotid endarterectomy , Presents for chest pain for the past day.   Patient reports  acute SOB on 10/2, was evaluated at [facility] at the time and reports he was admitted fo Pneumonia , however , left AMA on 10/5/24 and went to [place] for further evaluation. At that time he reports he was admitted for flash pulmonary edema and was recommended for non emergent cardiac cath , but elected to follow up with his Cardiologist and have it done as outpatient. He has since follow up with Dr Pineda( cardiology) and was planned for angiogram. On day of admission he reports sever midsternal chest pain radiating to right shoulder , some associated SOB , no palpitations, some nausea, no vomiting . No cough , no fever or chills.   ED course: Seen by cardiology , given nitro and became hypotensive, given IV fluid bolus and BP improved , started on heparin and loaded with brillinta    (08 Oct 2024 20:10)    >  >  >Continue to monitor and observe patient  >Will endorse to primary team in AM      Carlo Cotton NP  Dept of Medicine CC: Notified by RN, pt with Chest pain.   Also called by House Cards informed to give Maalox, Pepcid and either Morphine or Dilaudid for Abdominal pain.   Pt seen at bedside, calm but verbalized discomfort, labs were drawn at time and 12 lead EKG done     HPI: 64M current smoker, CAD s/p stent to LAD, chronic back pain, CANDELARIA , TIA , PAD s/p stent and bypass ,p/w chest pain x 1 day in setting of NSTEMI, trop 74, 58 ECG with dynamic T wave changes, started on heparin gtt, loaded with Brilinta, and ASA cards following, planning for possible cath, but pending CT a/p for further eval.   Pt now admitted to floor from ER, verbalizing ongoing Chest pain and abd discomfort. Pt seen by House Cards requesting treatment for abdominal discomfort and GERD.     Vital Signs Last 24 Hrs  T(C): 36.7 (08 Oct 2024 19:30), Max: 36.7 (08 Oct 2024 19:30)  T(F): 98 (08 Oct 2024 19:30), Max: 98 (08 Oct 2024 19:30)  HR: 85 (08 Oct 2024 22:10) (69 - 88)  BP: 114/96 (08 Oct 2024 22:10) (94/61 - 161/71)  BP(mean): 100 (08 Oct 2024 22:10) (100 - 100)  RR: 18 (08 Oct 2024 22:10) (16 - 18)  SpO2: 98% (08 Oct 2024 22:10) (97% - 99%)    Parameters below as of 08 Oct 2024 22:10  Patient On (Oxygen Delivery Method): room air        Labs:                          17.2   10.92 )-----------( 261      ( 08 Oct 2024 18:09 )             51.4     10-08    135  |  95[L]  |  30[H]  ----------------------------<  111[H]  3.8   |  28  |  1.80[H]    Ca    9.6      08 Oct 2024 23:32  Mg     2.0     10-08    TPro  7.2  /  Alb  4.0  /  TBili  1.0  /  DBili  x   /  AST  15  /  ALT  20  /  AlkPhos  132[H]  10-08    CARDIAC MARKERS ( 08 Oct 2024 23:32 )  x     / x     / x     / x     / 1.3 ng/mL  CARDIAC MARKERS ( 08 Oct 2024 21:02 )  x     / x     / x     / x     / 1.3 ng/mL  CARDIAC MARKERS ( 08 Oct 2024 18:09 )  x     / x     / x     / x     / 1.7 ng/mL    Troponin T, High Sensitivity (10.08.24 @ 23:32)   Troponin T, High Sensitivity Result: 58: * Blood Gas Venous - Lactate (10.08.24 @ 23:26)   Blood Gas Venous - Lactate: 1.4 mmol/    Troponin T, High Sensitivity (10.08.24 @ 21:02)   Troponin T, High Sensitivity Result: 58: *     Creatine Kinase (10.08.24 @ 21:02)   Creatine Kinase: 21 U/L    Creatine Kinase (10.08.24 @ 21:02)   Creatine Kinase: 21 U/L    Troponin T, High Sensitivity (10.09.24 @ 02:18)   Troponin T, High Sensitivity Result: 52: *     Lactate, Blood (10.09.24 @ 02:18)   Lactate, Blood: 1.3 mmol/L      Radiology:    < from: Xray Chest 1 View- PORTABLE-Urgent (10.08.24 @ 19:28) >    IMPRESSION:  Clear lungs.    < end of copied text >        Physical Exam:  General: WN/WD, NAD, nontoxic appearing  Neurology: A&Ox3, nonfocal, WATSON x 4  Head:  Normocephalic, atraumatic  Respiratory: CTA B/L  CV: RRR, S1S2, no murmur  Abdominal: Soft, NT, ND no palpable mass  MSK: No edema, + peripheral pulses, FROM all 4 extremity    Assessment & Plan:   64M current smoker, CAD s/p stent to LAD, chronic back pain, CANDELARIA , TIA , PAD s/p stent and bypass ,p/w chest pain x 1 day in setting of NSTEMI, trop 74, 58 ECG with dynamic T wave changes, started on heparin gtt, loaded with Brilinta, and ASA cards following, planning for possible cath, but pending CT a/p for further eval.   Pt now admitted to floor from ER, verbalizing ongoing Chest pain and abd discomfort. Pt seen by House Cards requesting treatment for abdominal discomfort and GERD.     - Spoke with Night covering House Cards Dr. Arden Chan- gave Maalox, Pepcid, and Dilaudid 1 mg IVP x 1, pt reassessed, sleeping, wife at bedside informed he feels much better.  - Troponin sent at 23:32- 58 repeated at 02: 18am- 52  - Lactate sent @ 23:32 -1.4 repeated at 02:18 am- 1.3  12 Lead EKG done -NSR with TWI in V1to V6. HR 65, reviewed by Dr. Chan  - Repeat Troponin in AM   - c/w DAPT, and statin,  ASA and Brilinta loaded in ED,  - c/w Heparin gtt for ACS  - c/w tele monitoring       Carlo Cotton NP  Dept of Medicine  [id number]

## 2024-10-09 NOTE — DISCHARGE NOTE PROVIDER - NSDCFUSCHEDAPPT_GEN_ALL_CORE_FT
[facility]  ONCPAINMGT [address]  Scheduled Appointment: 10/23/2024     Jason Prieto  [facility] Physician [facility]  CARDIOLOGY [address]  Scheduled Appointment: 10/14/2024    Carolyn Russo  [facility]  ONCPAINMGT [address]  Scheduled Appointment: 10/23/2024     Carolyn Russo  [facility] Physician [facility]  ONCPAINMGT [address]  Scheduled Appointment: 10/23/2024    Jason Prieto  [facility]  CARDIOLOGY [address]  Scheduled Appointment: 11/18/2024
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
## 2024-10-09 NOTE — PATIENT PROFILE ADULT - NSPRESCRALCSIXMORE_GEN_A_NUR
Problem: Pressure Injury - Risk of  Goal: *Prevention of pressure injury  Description: Document Phillip Scale and appropriate interventions in the flowsheet. Outcome: Progressing Towards Goal  Note: Pressure Injury Interventions:  Sensory Interventions: Assess changes in LOC    Moisture Interventions: Absorbent underpads    Activity Interventions: Pressure redistribution bed/mattress(bed type)    Mobility Interventions: Pressure redistribution bed/mattress (bed type)    Nutrition Interventions: Document food/fluid/supplement intake    Friction and Shear Interventions: Minimize layers                Problem: Pain  Goal: *Control of Pain  Outcome: Progressing Towards Goal     Problem: Pressure Injury - Risk of  Goal: *Prevention of pressure injury  Description: Document Phillip Scale and appropriate interventions in the flowsheet. Outcome: Progressing Towards Goal  Note: Pressure Injury Interventions:  Sensory Interventions: Assess changes in LOC    Moisture Interventions: Absorbent underpads    Activity Interventions: Pressure redistribution bed/mattress(bed type)    Mobility Interventions: Pressure redistribution bed/mattress (bed type)    Nutrition Interventions: Document food/fluid/supplement intake    Friction and Shear Interventions: Minimize layers                Problem: Falls - Risk of  Goal: *Absence of Falls  Description: Document Jacqueline Fall Risk and appropriate interventions in the flowsheet. Outcome: Progressing Towards Goal  Note: Fall Risk Interventions:  Mobility Interventions: Bed/chair exit alarm    Mentation Interventions: Bed/chair exit alarm    Medication Interventions: Bed/chair exit alarm    Elimination Interventions:  Toileting schedule/hourly rounds    History of Falls Interventions: Bed/chair exit alarm Never

## 2024-10-09 NOTE — DISCHARGE NOTE PROVIDER - HOSPITAL COURSE
*****DO NOT DELETE BELOW MUST BE INCLUDED IN DISCHARGE SUMMARY******  Cardiac Rehab NSTEMI, Post PCI):              *Education on benefits of Cardiac Rehab provided to patient: Yes         *Referral and Prescription Given for Cardiac Rehab : Yes         *Pt given list of locations & instructed to contact their insurance company to review list of participating providers: Yes         *Pt instructed to bring Cardiac Rehab prescription with them to Cardiology Follow up appointment for assistance with enrollment: Yes         *Pt discharged with copies detail cardiovascular history, medications, testing/treatments: Yes     HPI:  Patient is a 64-year-old male active smoker with a past medical history significant for  CAD s/p stent to LAD  , COPD , chronic back pain , CANDELARIA , , h/o TIA , PAD s/p iliac stent and fem-pop bypass , h/o carotid endarterectomy , Presents for chest pain for the past day.   Patient reports  acute SOB on 10/2, was evaluated at [facility] at the time and reports he was admitted fo Pneumonia , however , left AMA on 10/5/24 and went to [place] for further evaluation. At that time he reports he was admitted for flash pulmonary edema and was recommended for non emergent cardiac cath , but elected to follow up with his Cardiologist and have it done as outpatient. He has since follow up with Dr Pineda( cardiology) and was planned for angiogram. On day of admission he reports sever midsternal chest pain radiating to right shoulder , some associated SOB , no palpitations, some nausea, no vomiting . No cough , no fever or chills.   ED course: Seen by cardiology , given nitro and became hypotensive, given IV fluid bolus and BP improved , started on heparin and loaded with brillinta    (08 Oct 2024 20:10)      Hospital Course:. Presented to [facility] from OSH for NSTEMI starte don heparin gtt and loaded with brilinta. LHC performed without complication on 10/9 with RCA 99% lesion now s/p FELISA and LAD 50% without current intervention. TTE was performed showing EF 69% with moderate diastolic dysfunction and moderate to severe MR. Patient received diuresis for volume overload during admission and creatinine was monitored. Patient was cleeared for discharge home on 10/1024.      Weight - Discharge/Trend:  Weight in k.6 (10-10-24 @ 10:42)      Documented EF:  69%    Guideline Directed Medical Therapy Prescribed/Reason why not prescribed:  B-Blocker:  Yes  ARNI/ACE-I/ARB: EF normal  MRA: EF Normal  SGLT2 inhibitor: EF normal    Appointment scheduled within 7 days?  [ ] YES [ ] NO    Active or Pending Issues Requiring Follow-up: Cardiology follow up for mod to severe MR and CAD    Advanced Directives:   [X  ] Full code  [ ] DNR  [ ] Hospice    Discharge Diagnoses: NSTEMI  COronary artery disase  Acute diastolic heart failure  Chronic kidney disease  HLD                            *****DO NOT DELETE BELOW MUST BE INCLUDED IN DISCHARGE SUMMARY******  Cardiac Rehab NSTEMI, Post PCI):              *Education on benefits of Cardiac Rehab provided to patient: Yes         *Referral and Prescription Given for Cardiac Rehab : Yes         *Pt given list of locations & instructed to contact their insurance company to review list of participating providers: Yes         *Pt instructed to bring Cardiac Rehab prescription with them to Cardiology Follow up appointment for assistance with enrollment: Yes         *Pt discharged with copies detail cardiovascular history, medications, testing/treatments: Yes     HPI:  Patient is a 64-year-old male active smoker with a past medical history significant for  CAD s/p stent to LAD  , COPD , chronic back pain , CANDELARIA , , h/o TIA , PAD s/p iliac stent and fem-pop bypass , h/o carotid endarterectomy , Presents for chest pain for the past day.   Patient reports  acute SOB on 10/2, was evaluated at [facility] at the time and reports he was admitted fo Pneumonia , however , left AMA on 10/5/24 and went to [place] for further evaluation. At that time he reports he was admitted for flash pulmonary edema and was recommended for non emergent cardiac cath , but elected to follow up with his Cardiologist and have it done as outpatient. He has since follow up with Dr Pineda( cardiology) and was planned for angiogram. On day of admission he reports sever midsternal chest pain radiating to right shoulder , some associated SOB , no palpitations, some nausea, no vomiting . No cough , no fever or chills.   ED course: Seen by cardiology , given nitro and became hypotensive, given IV fluid bolus and BP improved , started on heparin and loaded with brillinta    (08 Oct 2024 20:10)      Hospital Course:. Presented to [facility] from OSH for NSTEMI starte don heparin gtt and loaded with brilinta. LHC performed without complication on 10/9 with RCA 99% lesion now s/p FELISA and LAD 50% without current intervention. TTE was performed showing EF 69% with moderate diastolic dysfunction and moderate to severe MR. Patient received diuresis for volume overload during admission and creatinine was monitored. Per Cardiology, will optimize his medical therapy and plan for outpatient followup with Dr. Prieto next week. Discharge/dispo/med rec discussed with Dr. Paige who determined patient stable and medically cleared for discharge home on 10/10/24.      Weight - Discharge/Trend:  Weight in k.6 (10-10-24 @ 10:42)      Documented EF:  69%    Guideline Directed Medical Therapy Prescribed/Reason why not prescribed:  B-Blocker:  Yes  ARNI/ACE-I/ARB: EF normal  MRA: EF Normal  SGLT2 inhibitor: EF normal    Appointment scheduled within 7 days?  [ ] YES [ ] NO    Active or Pending Issues Requiring Follow-up: Cardiology follow up for mod to severe MR and CAD    Advanced Directives:   [X ] Full code  [ ] DNR  [ ] Hospice    Discharge Diagnoses: NSTEMI  COronary artery disase  Acute diastolic heart failure  Chronic kidney disease  HLD                            *****DO NOT DELETE BELOW MUST BE INCLUDED IN DISCHARGE SUMMARY******  Cardiac Rehab NSTEMI, Post PCI):              *Education on benefits of Cardiac Rehab provided to patient: Yes         *Referral and Prescription Given for Cardiac Rehab : Yes         *Pt given list of locations & instructed to contact their insurance company to review list of participating providers: Yes         *Pt instructed to bring Cardiac Rehab prescription with them to Cardiology Follow up appointment for assistance with enrollment: Yes         *Pt discharged with copies detail cardiovascular history, medications, testing/treatments: Yes     HPI:  Patient is a 64-year-old male active smoker with a past medical history significant for  CAD s/p stent to LAD  , COPD , chronic back pain , CANDELARIA , , h/o TIA , PAD s/p iliac stent and fem-pop bypass , h/o carotid endarterectomy , Presents for chest pain for the past day.   Patient reports  acute SOB on 10/2, was evaluated at [facility] at the time and reports he was admitted fo Pneumonia , however , left AMA on 10/5/24 and went to [place] for further evaluation. At that time he reports he was admitted for flash pulmonary edema and was recommended for non emergent cardiac cath , but elected to follow up with his Cardiologist and have it done as outpatient. He has since follow up with Dr Pineda( cardiology) and was planned for angiogram. On day of admission he reports sever midsternal chest pain radiating to right shoulder , some associated SOB , no palpitations, some nausea, no vomiting . No cough , no fever or chills.   ED course: Seen by cardiology , given nitro and became hypotensive, given IV fluid bolus and BP improved , started on heparin and loaded with brillinta    (08 Oct 2024 20:10)      Hospital Course:. Presented to [facility] from OSH for NSTEMI started on heparin gtt and loaded with brilinta. LHC performed without complication on 10/9 with RCA 99% lesion now s/p FELISA and LAD 50% without current intervention. TTE was performed showing EF 69% with moderate diastolic dysfunction and moderate to severe MR. Patient received diuresis for volume overload during admission and creatinine was monitored. Per Cardiology, will optimize his medical therapy and plan for outpatient followup with Dr. Prieto next week. Discharge/dispo/med rec discussed with Dr. Paige who determined patient stable and medically cleared for discharge home on 10/10/24.      Weight - Discharge/Trend:  Weight in k.6 (10-10-24 @ 10:42)      Documented EF:  69%    Guideline Directed Medical Therapy Prescribed/Reason why not prescribed:  B-Blocker:  Yes  ARNI/ACE-I/ARB: EF normal  MRA: EF Normal  SGLT2 inhibitor: EF normal    Appointment scheduled within 7 days?  [X] YES [ ] NO    Active or Pending Issues Requiring Follow-up: Cardiology follow up for mod to severe MR and CAD    Advanced Directives:   [X ] Full code  [ ] DNR  [ ] Hospice    Discharge Diagnoses: NSTEMI  COronary artery disase  Acute diastolic heart failure  Chronic kidney disease  HLD                            *****DO NOT DELETE BELOW MUST BE INCLUDED IN DISCHARGE SUMMARY******  Cardiac Rehab NSTEMI, Post PCI):              *Education on benefits of Cardiac Rehab provided to patient: Yes         *Referral and Prescription Given for Cardiac Rehab : Yes         *Pt given list of locations & instructed to contact their insurance company to review list of participating providers: Yes         *Pt instructed to bring Cardiac Rehab prescription with them to Cardiology Follow up appointment for assistance with enrollment: Yes         *Pt discharged with copies detail cardiovascular history, medications, testing/treatments: Yes     HPI:  Patient is a 64-year-old male active smoker with a past medical history significant for  CAD s/p stent to LAD  , COPD , chronic back pain , CANDELARIA , , h/o TIA , PAD s/p iliac stent and fem-pop bypass , h/o carotid endarterectomy , Presents for chest pain for the past day.   Patient reports  acute SOB on 10/2, was evaluated at [facility] at the time and reports he was admitted fo Pneumonia , however , left AMA on 10/5/24 and went to [place] for further evaluation. At that time he reports he was admitted for flash pulmonary edema and was recommended for non emergent cardiac cath , but elected to follow up with his Cardiologist and have it done as outpatient. He has since follow up with Dr Pineda( cardiology) and was planned for angiogram. On day of admission he reports sever midsternal chest pain radiating to right shoulder , some associated SOB , no palpitations, some nausea, no vomiting . No cough , no fever or chills.   ED course: Seen by cardiology , given nitro and became hypotensive, given IV fluid bolus and BP improved , started on heparin and loaded with brillinta    (08 Oct 2024 20:10)      Hospital Course:. Presented to [facility] from OSH for NSTEMI started on heparin gtt and loaded with brilinta. LHC performed without complication on 10/9 with RCA 99% lesion now s/p FELISA and LAD 50% without current intervention. TTE was performed showing EF 69% with moderate diastolic dysfunction and moderate to severe MR. Patient received diuresis for volume overload during admission and creatinine was monitored. Per Cardiology, will optimize his medical therapy and plan for outpatient followup with Dr. Prieto next week. Due to Brilinta requiring prior auth and concern for medication compliance post free month supply, per Dr. Gil, Plavix load on 10/10/24 and resume Plavix 75 mg QD on 10/11/24. Discharge/dispo/med rec discussed with Dr. Paige who determined patient stable and medically cleared for discharge home on 10/10/24.      Weight - Discharge/Trend:  Weight in k.6 (10-10-24 @ 10:42)      Documented EF:  69%    Guideline Directed Medical Therapy Prescribed/Reason why not prescribed:  B-Blocker:  Yes  ARNI/ACE-I/ARB: EF normal  MRA: EF Normal  SGLT2 inhibitor: EF normal    Appointment scheduled within 7 days?  [X] YES [ ] NO    Active or Pending Issues Requiring Follow-up: Cardiology follow up for mod to severe MR and CAD    Advanced Directives:   [X ] Full code  [ ] DNR  [ ] Hospice    Discharge Diagnoses: NSTEMI  COronary artery disase  Acute diastolic heart failure  Chronic kidney disease  HLD                            *****DO NOT DELETE BELOW MUST BE INCLUDED IN DISCHARGE SUMMARY******  Cardiac Rehab NSTEMI, Post PCI):              *Education on benefits of Cardiac Rehab provided to patient: Yes         *Referral and Prescription Given for Cardiac Rehab : Yes         *Pt given list of locations & instructed to contact their insurance company to review list of participating providers: Yes         *Pt instructed to bring Cardiac Rehab prescription with them to Cardiology Follow up appointment for assistance with enrollment: Yes         *Pt discharged with copies detail cardiovascular history, medications, testing/treatments: Yes     HPI:  Patient is a 64-year-old male active smoker with a past medical history significant for  CAD s/p stent to LAD  , COPD , chronic back pain , CANDELARIA , , h/o TIA , PAD s/p iliac stent and fem-pop bypass , h/o carotid endarterectomy , Presents for chest pain for the past day.   Patient reports  acute SOB on 10/2, was evaluated at [facility] at the time and reports he was admitted fo Pneumonia , however , left AMA on 10/5/24 and went to [place] for further evaluation. At that time he reports he was admitted for flash pulmonary edema and was recommended for non emergent cardiac cath , but elected to follow up with his Cardiologist and have it done as outpatient. He has since follow up with Dr Pineda( cardiology) and was planned for angiogram. On day of admission he reports sever midsternal chest pain radiating to right shoulder , some associated SOB , no palpitations, some nausea, no vomiting . No cough , no fever or chills.   ED course: Seen by cardiology , given nitro and became hypotensive, given IV fluid bolus and BP improved , started on heparin and loaded with brillinta    (08 Oct 2024 20:10)      Hospital Course:. Presented to [facility] from OSH for NSTEMI started on heparin gtt and loaded with brilinta. LHC performed without complication on 10/9 with RCA 99% lesion now s/p FELISA and LAD 50% without current intervention. TTE was performed showing EF 69% with moderate diastolic dysfunction and moderate to severe MR. Patient received diuresis for volume overload during admission and creatinine was monitored. Per Cardiology, will optimize his medical therapy and plan for outpatient followup with Dr. Prieto next week. Due to Brilinta requiring prior auth and concern for medication compliance post free month supply, per Dr. Gil, Plavix load on 10/10/24 and resume Plavix 75 mg QD on 10/11/24. Discharge/dispo/med rec discussed with Dr. Paige who determined patient stable and medically cleared for discharge home on 10/10/24.      Weight - Discharge/Trend:  Weight in k.6 (10-10-24 @ 10:42)      Documented EF:  69%    Guideline Directed Medical Therapy Prescribed/Reason why not prescribed:  B-Blocker:  Yes  ARNI/ACE-I/ARB: EF normal  MRA: EF Normal  SGLT2 inhibitor: EF normal    Appointment scheduled within 7 days?  [X] YES [ ] NO    Active or Pending Issues Requiring Follow-up: Cardiology follow up for mod to severe MR and CAD    Advanced Directives:   [X ] Full code  [ ] DNR  [ ] Hospice    Discharge Diagnoses: NSTEMI  COronary artery disase  Acute diastolic heart failure  Chronic kidney disease  HLD    Instructed for Cardiac Rehab NSTEMI, Post PCI-            *Education on benefits of Cardiac Rehab provided to patient: Yes         *Referral and Prescription Given for Cardiac Rehab : Yes         *Pt given list of locations & instructed to contact their insurance company to review list of participating providers: Yes         *Pt instructed to bring Cardiac Rehab prescription with them to Cardiology Follow up appointment for assistance with enrollment: Yes         *Pt discharged with copies detail cardiovascular history, medications, testing/treatments: Yes

## 2024-10-09 NOTE — DISCHARGE NOTE PROVIDER - PROVIDER TOKENS
PROVIDER:[TOKEN:[[id number]]] PROVIDER:[TOKEN:[[id number]]],PROVIDER:[TOKEN:[[id number]],FOLLOWUP:[1 week]] PROVIDER:[TOKEN:[[id number]]],PROVIDER:[TOKEN:[[id number]],FOLLOWUP:[1 week]],PROVIDER:[TOKEN:[[id number]],SCHEDULEDAPPT:[10/14/2024],SCHEDULEDAPPTTIME:[01:45 PM],ESTABLISHEDPATIENT:[T]]

## 2024-10-09 NOTE — DISCHARGE NOTE PROVIDER - CARE PROVIDERS DIRECT ADDRESSES
,[email or web address] [place] [email or web address] ,[email or web address] [email or web address] [facility],[email or web address].[facility] CorporationPremium StoreLifeCare Hospitals of North Carolina.[facility]

## 2024-10-09 NOTE — DISCHARGE NOTE PROVIDER - CARE PROVIDER_API CALL
Cuba McdonaldJose  Cardiology  [address], [address] [place] [address]  Phone: [phone]  Fax: [phone]  Follow Up Time:    Cuba McdonaldJose  Cardiology  [address], [address] [place] [address]  Phone: [phone]  Fax: [phone]  Follow Up Time:     [place] [address]  Phone: [phone]  Fax: [phone]  Follow Up Time: 1 week   Cuba McdonaldJose  Cardiology  [address], [address] [place] [address]  Phone: [phone]  Fax: [phone]  Follow Up Time:     Jose Mendoza  [facility] Medicine  [address] [place] [address]  Phone: [phone]  Fax: [phone]  Follow Up Time: 1 week    Jason Prieto  Cardiovascular Disease  [address] [place] [address]  Phone: [phone]  Fax: [phone]  Established Patient  Scheduled Appointment: 10/14/2024 01:45 PM

## 2024-10-09 NOTE — PROGRESS NOTE ADULT - SUBJECTIVE AND OBJECTIVE BOX
Pina Stovall MD  Division of Hospital Medicine  Reachable on MS Teams    PROGRESS NOTE:     Patient is a 64y old  Male who presents with a chief complaint of chest pain x 1 day (09 Oct 2024 00:47)      SUBJECTIVE / OVERNIGHT EVENTS: No acute events overnight, seen this AM as patient was going to cath. Patient extremely anxious, denies chest pain at present moment, states no shortness of breath.     ADDITIONAL REVIEW OF SYSTEMS:    MEDICATIONS  (STANDING):  aspirin enteric coated 81 milliGRAM(s) Oral daily  atorvastatin 80 milliGRAM(s) Oral at bedtime  famotidine    Tablet 40 milliGRAM(s) Oral once  fentaNYL    Injectable 50 MICROGram(s) IV Push once  heparin  Infusion.  Unit(s)/Hr (10 mL/Hr) IV Continuous <Continuous>  influenza   Vaccine 0.5 milliLiter(s) IntraMuscular once  magnesium oxide 400 milliGRAM(s) Oral daily  metoprolol tartrate 25 milliGRAM(s) Oral two times a day  nicotine - 21 mG/24Hr(s) Patch 1 Patch Transdermal daily  sodium chloride 0.9%. 1000 milliLiter(s) (75 mL/Hr) IV Continuous <Continuous>    MEDICATIONS  (PRN):  acetaminophen     Tablet .. 650 milliGRAM(s) Oral every 6 hours PRN Temp greater or equal to 38C (100.4F), Mild Pain (1 - 3)  aluminum hydroxide/magnesium hydroxide/simethicone Suspension 30 milliLiter(s) Oral every 4 hours PRN Dyspepsia  heparin   Injectable 4000 Unit(s) IV Push every 6 hours PRN For aPTT less than 40  melatonin 3 milliGRAM(s) Oral at bedtime PRN Insomnia  oxyCODONE    IR 10 milliGRAM(s) Oral two times a day PRN Severe Pain (7 - 10)      CAPILLARY BLOOD GLUCOSE      POCT Blood Glucose.: 88 mg/dL (09 Oct 2024 14:05)    I&O's Summary    08 Oct 2024 07:01  -  09 Oct 2024 07:00  --------------------------------------------------------  IN: 0 mL / OUT: 850 mL / NET: -850 mL        PHYSICAL EXAM:  Vital Signs Last 24 Hrs  T(C): 36.6 (09 Oct 2024 13:45), Max: 36.7 (08 Oct 2024 19:30)  T(F): 97.8 (09 Oct 2024 13:45), Max: 98.1 (09 Oct 2024 00:33)  HR: 54 (09 Oct 2024 14:15) (54 - 88)  BP: 146/87 (09 Oct 2024 14:15) (94/61 - 161/71)  BP(mean): 100 (08 Oct 2024 22:10) (100 - 100)  RR: 18 (09 Oct 2024 14:15) (16 - 20)  SpO2: 98% (09 Oct 2024 14:15) (97% - 99%)    Parameters below as of 09 Oct 2024 14:15  Patient On (Oxygen Delivery Method): room air        CONSTITUTIONAL: NAD, well-developed  RESPIRATORY: Normal respiratory effort; lungs are clear to auscultation bilaterally  CARDIOVASCULAR: Regular rate and rhythm, normal S1 and S2, no murmur/rub/gallop; No lower extremity edema  MUSCLOSKELETAL: no clubbing or cyanosis of digits; no joint swelling or tenderness to palpation, ambulating without assistance  PSYCH: A+O to person, place, and time; affect anxious, tearful    LABS:                        14.7   9.23  )-----------( 204      ( 09 Oct 2024 09:56 )             43.5     10-09    135  |  96  |  29[H]  ----------------------------<  94  3.9   |  26  |  1.59[H]    Ca    9.3      09 Oct 2024 09:56  Mg     2.0     10-08    TPro  7.3  /  Alb  3.9  /  TBili  1.1  /  DBili  x   /  AST  15  /  ALT  18  /  AlkPhos  130[H]  10-09    PT/INR - ( 08 Oct 2024 18:09 )   PT: 11.1 sec;   INR: 0.97 ratio         PTT - ( 09 Oct 2024 09:56 )  PTT:35.7 sec  CARDIAC MARKERS ( 08 Oct 2024 23:32 )  x     / x     / x     / x     / 1.3 ng/mL  CARDIAC MARKERS ( 08 Oct 2024 21:02 )  x     / x     / x     / x     / 1.3 ng/mL  CARDIAC MARKERS ( 08 Oct 2024 18:09 )  x     / x     / x     / x     / 1.7 ng/mL      Urinalysis Basic - ( 09 Oct 2024 09:56 )    Color: x / Appearance: x / SG: x / pH: x  Gluc: 94 mg/dL / Ketone: x  / Bili: x / Urobili: x   Blood: x / Protein: x / Nitrite: x   Leuk Esterase: x / RBC: x / WBC x   Sq Epi: x / Non Sq Epi: x / Bacteria: x          RADIOLOGY & ADDITIONAL TESTS:  Results Reviewed:   Imaging Personally Reviewed:  Electrocardiogram Personally Reviewed:    COORDINATION OF CARE:  Care Discussed with Consultants/Other Providers [Y/N]:  Prior or Outpatient Records Reviewed [Y/N]:

## 2024-10-09 NOTE — PROGRESS NOTE ADULT - PROBLEM SELECTOR PLAN 1
Cards following, dynamic T-wave changes, chest pain persistent, started on heparin gtt and brilinta loaded   - monitor on telemetry  - s/p fentanyl x 2 doses , can  use Morphine PRN for breakthrough pain  - [facility] today, f/u recs  - if recurrent chest pain, obtain EKG and trops  - c/w DAPT and Statin   - TTE pending  - Cardiology consult appreciated, rec CTs, PVR

## 2024-10-09 NOTE — DISCHARGE NOTE PROVIDER - NSDCFUADDAPPT_GEN_ALL_CORE_FT
APPTS ARE READY TO BE MADE: [x] YES    Best Family or Patient Contact (if needed):    Additional Information about above appointments (if needed):    1: [facility]  2:   3:     Other comments or requests:    APPTS ARE READY TO BE MADE: [x] YES    Best Family or Patient Contact (if needed):    Additional Information about above appointments (if needed):    1: [facility]  2: PCP  3:     Other comments or requests:    APPTS ARE READY TO BE MADE: [x] YES    Best Family or Patient Contact (if needed):    Additional Information about above appointments (if needed):    1: [facility]  2: PCP  3:     Other comments or requests:   Cardio-Prior to outreaching the patient, it was visible that the patient has secured a follow up appointment which was not scheduled by our team. Appt is on 10/14/24 at 1:30pm with  [address] [place] [address] APPTS ARE READY TO BE MADE: [x] YES    Best Family or Patient Contact (if needed):    Additional Information about above appointments (if needed):    1: [facility]  2: PCP  3:     Other comments or requests:   Cardio-Prior to outreaching the patient, it was visible that the patient has secured a follow up appointment which was not scheduled by our team. Appt is on 10/14/24 at 1:30pm with  [address] [place] [address]-Prior to outreaching the patient, it was visible that the patient has secured a follow up appointment which was not scheduled by our team. Appt iks on 10/14/24 at 1:45pm    PCP-Provided patient with provider referral information, however patient prefers to schedule the appointments on their own.

## 2024-10-09 NOTE — PROVIDER CONTACT NOTE (OTHER) - ACTION/TREATMENT ORDERED:
Per provider cardiology saw pt regarding chest pain, may be GI related. Provider ordered Maalox and Dilaudid.

## 2024-10-09 NOTE — PATIENT PROFILE ADULT - FALL HARM RISK - HARM RISK INTERVENTIONS
Assistance with ambulation/Monitor gait and stability/Visual Cue: Yellow wristband and red socks/Bed in lowest position, wheels locked, appropriate side rails in place/Call bell, personal items and telephone in reach/Instruct patient to call for assistance before getting out of bed or chair/Non-slip footwear when patient is out of bed/[place] to call system/Physically safe environment - no spills, clutter or unnecessary equipment/Purposeful Proactive Rounding/Room/bathroom lighting operational, light cord in reach

## 2024-10-09 NOTE — PROVIDER CONTACT NOTE (OTHER) - BACKGROUND
Pt has PMH of CAD s/p stent to LAD, chronic back pain, CANDELARIA, TIA, PAD s/p stent and bypass, p/w chest pain x1 day. Pt dignosed with NSTEMI

## 2024-10-09 NOTE — PROVIDER CONTACT NOTE (OTHER) - ASSESSMENT
Pt AOx4, VSS on RA. Pt denies any SOB, palpitations, lightheadedness, or dizziness. Pt describing chest pain as 8/10 squeezing pain that doesn't radiate.

## 2024-10-09 NOTE — DISCHARGE NOTE PROVIDER - NSDCCPCAREPLAN_GEN_ALL_CORE_FT
PRINCIPAL DISCHARGE DIAGNOSIS  Diagnosis: NSTEMI (non-ST elevation myocardial infarction)  Assessment and Plan of Treatment: You came to the hospital with chest pain that was due to a heart attack. You underwent an angiogram and had a stent placed in your right coronary artery. Please follow post procedure directions and follow up with your cardiologist in 1-2 weeks.      SECONDARY DISCHARGE DIAGNOSES  Diagnosis: Stage 3 chronic kidney disease  Assessment and Plan of Treatment: Please continue to follow with your primary care doctor to monitor your kidney function.    Diagnosis: Acute diastolic heart failure  Assessment and Plan of Treatment: You received treatment for fluid in your lung that is likely from your heart. Your heart function was normal but shows difficulty in relaxing. Continue your medications as prescribed and follow up with a cardiologist within 7 days to continue to monitor your heart.    Diagnosis: Mitral regurgitation  Assessment and Plan of Treatment: One of the valves in the left side of your heart, the Mitral valve, was leaky when an echocardiogram was performed. Your heart doctor can monitor this over time to see if there is any change.     PRINCIPAL DISCHARGE DIAGNOSIS  Diagnosis: NSTEMI (non-ST elevation myocardial infarction)  Assessment and Plan of Treatment: You came to the hospital with chest pain that was due to a heart attack. You underwent an angiogram and had a stent placed in your right coronary artery. Please follow post procedure directions and follow up with your cardiologist in 1-2 weeks.  Adrian requires prior authorization through insurance. Dr. Gil recommends Plavix load on 10/10/24 and resume home dose Plavix 75 mg once daily on 10/11/24      SECONDARY DISCHARGE DIAGNOSES  Diagnosis: Stage 3 chronic kidney disease  Assessment and Plan of Treatment: Please continue to follow with your primary care doctor to monitor your kidney function.    Diagnosis: Acute diastolic heart failure  Assessment and Plan of Treatment: You received treatment for fluid in your lung that is likely from your heart. Your heart function was normal but shows difficulty in relaxing. Continue your medications as prescribed and follow up with a cardiologist within 7 days to continue to monitor your heart.    Diagnosis: Mitral regurgitation  Assessment and Plan of Treatment: One of the valves in the left side of your heart, the Mitral valve, was leaky when an echocardiogram was performed. Your heart doctor can monitor this over time to see if there is any change.     PRINCIPAL DISCHARGE DIAGNOSIS  Diagnosis: NSTEMI (non-ST elevation myocardial infarction)  Assessment and Plan of Treatment: You came to the hospital with chest pain that was due to a heart attack. You underwent an angiogram and had a stent placed in your right coronary artery. Please follow post procedure directions and follow up with your cardiologist in 1-2 weeks.  Avoid using NSAIDs  (Aleve, Motrin, ibuprofen, naproxen) while on DAPT, please utilize Tylenol for pain control (not to exceed 4gm in 24 hours)  Baltazarilinta requires prior authorization through insurance. Dr. Gil recommends Plavix load on 10/10/24 and resume home dose Plavix 75 mg once daily on 10/11/24      SECONDARY DISCHARGE DIAGNOSES  Diagnosis: Stage 3 chronic kidney disease  Assessment and Plan of Treatment: Please continue to follow with your primary care doctor to monitor your kidney function.    Diagnosis: Acute diastolic heart failure  Assessment and Plan of Treatment: You received treatment for fluid in your lung that is likely from your heart. Your heart function was normal but shows difficulty in relaxing. Continue your medications as prescribed and follow up with a cardiologist within 7 days to continue to monitor your heart.    Diagnosis: Mitral regurgitation  Assessment and Plan of Treatment: One of the valves in the left side of your heart, the Mitral valve, was leaky when an echocardiogram was performed. Your heart doctor can monitor this over time to see if there is any change.

## 2024-10-09 NOTE — DISCHARGE NOTE PROVIDER - NSDCFUADDINST_GEN_ALL_CORE_FT
We have provided you with a prescription for cardiac rehab which is medically supervised exercise program for your heart and has been shown to improve the quantity and quality of life of people with heart disease like yours. You should attend cardiac rehab 3 times per week for 12 weeks. We have provided you with a list of nearby facilities. Please call your insurance carrier to determine which of these facilities are covered under your plan. Please bring this prescription with you to your follow up appointment with your cardiologist who can then further assist you to enroll into a cardiac rehab program. We have provided you with a prescription for cardiac rehab which is medically supervised exercise program for your heart and has been shown to improve the quantity and quality of life of people with heart disease like yours. You should attend cardiac rehab 3 times per week for 12 weeks. We have provided you with a list of nearby facilities. Please call your insurance carrier to determine which of these facilities are covered under your plan. Please bring this prescription with you to your follow up appointment with your cardiologist who can then further assist you to enroll into a cardiac rehab program.    No heavy lifting greater than 5-10 pounds with right wrist x one week. No strenuous activity x 3 weeks. Monitor site of procedure and notify your doctor for any redness, swelling, discharge

## 2024-10-09 NOTE — DISCHARGE NOTE PROVIDER - NSDCMRMEDTOKEN_GEN_ALL_CORE_FT
acetaminophen 325 mg oral tablet: 3 tab(s) orally every 6 hours  aspirin 81 mg oral tablet: orally once a day  atorvastatin 80 mg oral tablet: 1 tab(s) orally once a day (at bedtime)  magnesium oxide:   metoprolol tartrate 25 mg oral tablet: 1 tab(s) orally 2 times a day  oxyCODONE 10 mg oral tablet: orally 2 times a day  Plavix 75 mg oral tablet: 1 tab(s) orally once a day  potassium chloride: 20 milliequivalent(s)  torsemide 20 mg oral tablet: 2 tab(s) orally once a day   acetaminophen 325 mg oral tablet: 3 tab(s) orally every 6 hours as needed for  mild pain Max Daily Dose: 4,000mg/DAY  aspirin 81 mg oral tablet: orally once a day  atorvastatin 80 mg oral tablet: 1 tab(s) orally once a day (at bedtime)  Brilinta (ticagrelor) 90 mg oral tablet: 1 tab(s) orally 2 times a day MDD: 1  Cardiac rehab: 2-3 times a week for 12 weeks for CAD s/p NSTEMI and PCI MDD: 1  magnesium oxide 400 mg oral tablet: 1 tab(s) orally once a day  metoprolol tartrate 25 mg oral tablet: 1 tab(s) orally 2 times a day  nicotine 21 mg/24 hr transdermal film, extended release: 1 patch transdermal every 24 hours  oxyCODONE 10 mg oral tablet: 1 tab(s) orally 2 times a day as needed for  severe pain MDD: 2 tabs  potassium chloride: 20 milliequivalent(s)  torsemide 10 mg oral tablet: 1 tab(s) orally once a day   acetaminophen 325 mg oral tablet: 3 tab(s) orally every 6 hours as needed for  mild pain Max Daily Dose: 4,000mg/DAY  aspirin 81 mg oral tablet: orally once a day  atorvastatin 80 mg oral tablet: 1 tab(s) orally once a day (at bedtime)  Brilinta (ticagrelor) 90 mg oral tablet: 1 tab(s) orally 2 times a day MDD: 1  Cardiac rehab: 2-3 times a week for 12 weeks for CAD s/p NSTEMI and PCI MDD: 1  metoprolol tartrate 25 mg oral tablet: 1 tab(s) orally 2 times a day  nicotine 21 mg/24 hr transdermal film, extended release: 1 patch transdermal every 24 hours  oxyCODONE 10 mg oral tablet: 1 tab(s) orally 2 times a day as needed for  severe pain MDD: 2 tabs  torsemide 10 mg oral tablet: 1 tab(s) orally once a day   acetaminophen 325 mg oral tablet: 3 tab(s) orally every 6 hours as needed for  mild pain Max Daily Dose: 4,000mg/DAY  aspirin 81 mg oral tablet: orally once a day  atorvastatin 80 mg oral tablet: 1 tab(s) orally once a day (at bedtime)  Cardiac rehab: 2-3 times a week for 12 weeks for CAD s/p NSTEMI and PCI MDD: 1  metoprolol tartrate 25 mg oral tablet: 1 tab(s) orally 2 times a day  nicotine 21 mg/24 hr transdermal film, extended release: 1 patch transdermal every 24 hours  oxyCODONE 10 mg oral tablet: 1 tab(s) orally 2 times a day as needed for  severe pain MDD: 2 tabs  Plavix 75 mg oral tablet: 1 tab(s) orally once a day  torsemide 10 mg oral tablet: 1 tab(s) orally once a day

## 2024-10-09 NOTE — CONSULT NOTE ADULT - ASSESSMENT
64M current smoker  CAD s/p stent to LAD x2 (1993,2013,  chronic back pain , CANDELARIA , TIA , PAD s/p stent and bypass ,p/w chest pain x 1 day     EKG:   TTE:   Cath:   KHRIS:   HUMAIRA:   HEART:     Impression: Patient with elevated troponin concerning for NSTEMI w/ EKG showing     Recommendations:   #NSTEMI   --Please ensure patient loaded with   --Please ensure patient laoded with 180 of Brillinta, then 90 BID to start 24 hours later (Can do 600 of Plavix if patients bleeding risk is higher)  --Please start heparin gtt, please bolus to achieve ptt per ACS protcol  --Please start atorvastatin 80mg qhs   --Please trend troponin and CKMB until downtrending, please repeat EKG with every troponin   --Please order a transthoracic echo   --Check lipid panel, lipoprotein a and a1c.    --If worsening chest pain or if pain is refractory, please notify the Cardiology fellow   --Please monitor on telemetry  --K>4, Mg>2  --Strict ins and outs  --Daily standing weights   --If patient develops hemodynamic instability, please call    Please call the Cardiology team with questions, concerns or if there is any clinical change.     Thank you for this interesting consult    Arden Chan MD  Cardiology Fellow     64M current smoker  CAD s/p stent to LAD x2 (1993,2013) chronic back pain, CANDELARIA , TIA , PAD s/p stent and bypass p/w chest pain x 1 day found to have EKG changes and mildly elevated troponin. Cardiology consulted for ACS.     EKG: 10/8/2024: Wellens pattern on initial -> immediately resolution with only TWI in V2. Serial EKGs were checked.   TTE: No TTE on file per wife, some heart impairment   Cath: 2013: LAD PCI wo other disease, 2019: No coronary disease. Patent LAD stent.   Biomarkers; 74 ->58 ->58 - > 52     Impression: Patient with mildly elevated troponin with initially concerning biphasic T waves which was concerning for wellens pattern that resolved. The patient had new onset N/V with associated abdominal pain. Troponins appear to be flat and stable despite significant symptoms of distress overnight. The troponin leak is not consistent with severe of the clinical picture which raises suspicion for an additional process driving current symptoms. He does have a notable IAIN although notes no issues with urination.     Recommendations:   #NSTEMI   --s/p ASA load c/w 81mg   --s/p ticagrelor 180mg c/w 90mg BID   --c/w heparin gtt, per ACS protocol   --Please start atorvastatin 80mg qhs   --Please trend troponin and CKMB until downtrending, please repeat EKG with every troponin   --Please order a transthoracic echo   --Check lipid panel, and a1c.    --Check CT chest/abdomen/pelvis without contrast   - Check Post void residual bladder scan   --Please monitor on telemetry  --K>4, Mg>2  --Trial maalox and famotidine 20mg given concern for GI symptoms  --Pain control per primary team   --Strict ins and outs  --Daily standing weights   --If patient develops hemodynamic instability, please call    Please call the Cardiology team with questions, concerns or if there is any clinical change.     Case was discussed and reviewed with 5-7PM covering Interventional Attending at initial presentation and with interval changes overnight.     Thank you for this interesting consult.     Arden Chan MD  Cardiology Fellow         64M current smoker  CAD s/p stent to LAD x2 (1993,2013) chronic back pain, CANDELARIA , TIA , PAD s/p stent and bypass p/w chest pain x 1 day found to have EKG changes and mildly elevated troponin. Cardiology consulted for ACS.     EKG: 10/8/2024: Wellens pattern on initial -> immediately resolution with only TWI in V2. Serial EKGs were checked.   TTE: No TTE on file per wife, some heart impairment   Cath: 2013: LAD PCI wo other disease, 2019: No coronary disease. Patent LAD stent.   Biomarkers; 74 ->58 ->58 - > 52     Impression: Patient with mildly elevated troponin with initially concerning biphasic T waves which was concerning for wellens pattern that resolved. The patient had new onset N/V with associated abdominal pain. Troponins appear to be flat and stable despite significant symptoms of distress overnight. The troponin leak is not consistent with severe of the clinical picture which raises suspicion for an additional process driving current symptoms. He does have a notable IAIN although notes no issues with urination.     Recommendations:   #NSTEMI   --s/p ASA load c/w 81mg   --s/p ticagrelor 180mg c/w 90mg BID   --c/w heparin gtt, per ACS protocol   --Please start atorvastatin 80mg qhs   --Please trend troponin and CKMB until downtrending, please repeat EKG with every troponin   --Please order a transthoracic echo   --Check lipid panel, and a1c.    --Check CT chest/abdomen/pelvis without contrast   - Check Post void residual bladder scan   --Please monitor on telemetry  --K>4, Mg>2  --Trial maalox and famotidine 20mg given concern for GI symptoms  --Pain control per primary team   --Strict ins and outs  --Daily standing weights   --If patient develops hemodynamic instability, please call    Please call the Cardiology team with questions, concerns or if there is any clinical change.     Case was discussed and reviewed with 5-7PM covering Interventional Attending at initial presentation and with interval changes overnight.     Thank you for this interesting consult.     Arden Chan MD  Cardiology Fellow    Patient seen and examined with the fellow, the note above has been edited to reflect my independent history, physical exam, assessment and plan.      Cuba Mcdonald MD, PhD  Cardiology Attending  [facility]/ [facility]    For day time coverage Mon-Fri see Non-Service Consult Attending on [email or web address], password: [email or web address]; daytime weekends covered by general cardiology consult service attending.)

## 2024-10-09 NOTE — CHART NOTE - NSCHARTNOTEFT_GEN_A_CORE
Patient was seen on 1- to have her Cognitive History Form filled out.  Please call Marii back with the status on this.     Removal of Femoral Sheath By PAULINE Whitfield    Pulses in the (right lower extremity are (palpable). The patient was placed in the supine position. The insertion site was identified and the sutures were removed per protocol.  The _5___ [place] femoral sheath was then removed. Direct pressure was applied for  ___15__ minutes.     Monitoring of the (right) groin and both lower extremities including neuro-vascular checks and vital signs every 15 minutes x 4, then every 30 minutes x 2, then every 1 hour x 2 and the every 4 hours was ordered.    Complications: No hematoma, no bleed    Comments:    Education: medication adherence: in particular DAPT adherence with rational, groin care, signs and symptoms of groin complications reviewed with pt who verbalizes understanding. All questions answered.       Wayne Holley, SMITA

## 2024-10-10 ENCOUNTER — NON-APPOINTMENT (OUTPATIENT)
Age: 64
End: 2024-10-10

## 2024-10-10 ENCOUNTER — RESULT REVIEW (OUTPATIENT)
Age: 64
End: 2024-10-10

## 2024-10-10 ENCOUNTER — TRANSCRIPTION ENCOUNTER (OUTPATIENT)
Age: 64
End: 2024-10-10

## 2024-10-10 VITALS
OXYGEN SATURATION: 95 % | DIASTOLIC BLOOD PRESSURE: 61 MMHG | TEMPERATURE: 98 F | HEART RATE: 69 BPM | RESPIRATION RATE: 18 BRPM | SYSTOLIC BLOOD PRESSURE: 120 MMHG

## 2024-10-10 LAB
ANION GAP SERPL CALC-SCNC: 15 MMOL/L — SIGNIFICANT CHANGE UP (ref 5–17)
BUN SERPL-MCNC: 24 MG/DL — HIGH (ref 7–23)
CALCIUM SERPL-MCNC: 9.6 MG/DL — SIGNIFICANT CHANGE UP (ref 8.4–10.5)
CHLORIDE SERPL-SCNC: 96 MMOL/L — SIGNIFICANT CHANGE UP (ref 96–108)
CO2 SERPL-SCNC: 23 MMOL/L — SIGNIFICANT CHANGE UP (ref 22–31)
CREAT SERPL-MCNC: 1.31 MG/DL — HIGH (ref 0.5–1.3)
EGFR: 61 ML/MIN/1.73M2 — SIGNIFICANT CHANGE UP
GLUCOSE SERPL-MCNC: 87 MG/DL — SIGNIFICANT CHANGE UP (ref 70–99)
HCT VFR BLD CALC: 43.8 % — SIGNIFICANT CHANGE UP (ref 39–50)
HGB BLD-MCNC: 14.4 G/DL — SIGNIFICANT CHANGE UP (ref 13–17)
MAGNESIUM SERPL-MCNC: 2 MG/DL — SIGNIFICANT CHANGE UP (ref 1.6–2.6)
MCHC RBC-ENTMCNC: 31.3 PG — SIGNIFICANT CHANGE UP (ref 27–34)
MCHC RBC-ENTMCNC: 32.9 GM/DL — SIGNIFICANT CHANGE UP (ref 32–36)
MCV RBC AUTO: 95.2 FL — SIGNIFICANT CHANGE UP (ref 80–100)
NRBC # BLD: 0 /100 WBCS — SIGNIFICANT CHANGE UP (ref 0–0)
PHOSPHATE SERPL-MCNC: 3.3 MG/DL — SIGNIFICANT CHANGE UP (ref 2.5–4.5)
PLATELET # BLD AUTO: 197 K/UL — SIGNIFICANT CHANGE UP (ref 150–400)
POTASSIUM SERPL-MCNC: 3.8 MMOL/L — SIGNIFICANT CHANGE UP (ref 3.5–5.3)
POTASSIUM SERPL-SCNC: 3.8 MMOL/L — SIGNIFICANT CHANGE UP (ref 3.5–5.3)
RBC # BLD: 4.6 M/UL — SIGNIFICANT CHANGE UP (ref 4.2–5.8)
RBC # FLD: 13.8 % — SIGNIFICANT CHANGE UP (ref 10.3–14.5)
SODIUM SERPL-SCNC: 134 MMOL/L — LOW (ref 135–145)
WBC # BLD: 10.77 K/UL — HIGH (ref 3.8–10.5)
WBC # FLD AUTO: 10.77 K/UL — HIGH (ref 3.8–10.5)

## 2024-10-10 PROCEDURE — C1769: CPT

## 2024-10-10 PROCEDURE — 82947 ASSAY GLUCOSE BLOOD QUANT: CPT

## 2024-10-10 PROCEDURE — 80048 BASIC METABOLIC PNL TOTAL CA: CPT

## 2024-10-10 PROCEDURE — 83036 HEMOGLOBIN GLYCOSYLATED A1C: CPT

## 2024-10-10 PROCEDURE — 82803 BLOOD GASES ANY COMBINATION: CPT

## 2024-10-10 PROCEDURE — 82330 ASSAY OF CALCIUM: CPT

## 2024-10-10 PROCEDURE — C1887: CPT

## 2024-10-10 PROCEDURE — 99233 SBSQ HOSP IP/OBS HIGH 50: CPT

## 2024-10-10 PROCEDURE — 93005 ELECTROCARDIOGRAM TRACING: CPT

## 2024-10-10 PROCEDURE — 84295 ASSAY OF SERUM SODIUM: CPT

## 2024-10-10 PROCEDURE — 80061 LIPID PANEL: CPT

## 2024-10-10 PROCEDURE — 80053 COMPREHEN METABOLIC PANEL: CPT

## 2024-10-10 PROCEDURE — 93308 TTE F-UP OR LMTD: CPT

## 2024-10-10 PROCEDURE — 85027 COMPLETE CBC AUTOMATED: CPT

## 2024-10-10 PROCEDURE — 93454 CORONARY ARTERY ANGIO S&I: CPT | Mod: 59

## 2024-10-10 PROCEDURE — 85025 COMPLETE CBC W/AUTO DIFF WBC: CPT

## 2024-10-10 PROCEDURE — 82435 ASSAY OF BLOOD CHLORIDE: CPT

## 2024-10-10 PROCEDURE — C8929: CPT

## 2024-10-10 PROCEDURE — 86140 C-REACTIVE PROTEIN: CPT

## 2024-10-10 PROCEDURE — C9600: CPT | Mod: RC

## 2024-10-10 PROCEDURE — 83880 ASSAY OF NATRIURETIC PEPTIDE: CPT

## 2024-10-10 PROCEDURE — 99239 HOSP IP/OBS DSCHRG MGMT >30: CPT

## 2024-10-10 PROCEDURE — 85730 THROMBOPLASTIN TIME PARTIAL: CPT

## 2024-10-10 PROCEDURE — 99232 SBSQ HOSP IP/OBS MODERATE 35: CPT

## 2024-10-10 PROCEDURE — 96375 TX/PRO/DX INJ NEW DRUG ADDON: CPT

## 2024-10-10 PROCEDURE — 84484 ASSAY OF TROPONIN QUANT: CPT

## 2024-10-10 PROCEDURE — 82553 CREATINE MB FRACTION: CPT

## 2024-10-10 PROCEDURE — 84132 ASSAY OF SERUM POTASSIUM: CPT

## 2024-10-10 PROCEDURE — 85610 PROTHROMBIN TIME: CPT

## 2024-10-10 PROCEDURE — 93306 TTE W/DOPPLER COMPLETE: CPT | Mod: 26

## 2024-10-10 PROCEDURE — 85014 HEMATOCRIT: CPT

## 2024-10-10 PROCEDURE — 36415 COLL VENOUS BLD VENIPUNCTURE: CPT

## 2024-10-10 PROCEDURE — 84100 ASSAY OF PHOSPHORUS: CPT

## 2024-10-10 PROCEDURE — 83605 ASSAY OF LACTIC ACID: CPT

## 2024-10-10 PROCEDURE — 96374 THER/PROPH/DIAG INJ IV PUSH: CPT

## 2024-10-10 PROCEDURE — 85379 FIBRIN DEGRADATION QUANT: CPT

## 2024-10-10 PROCEDURE — C1894: CPT

## 2024-10-10 PROCEDURE — 83735 ASSAY OF MAGNESIUM: CPT

## 2024-10-10 PROCEDURE — C1874: CPT

## 2024-10-10 PROCEDURE — 82962 GLUCOSE BLOOD TEST: CPT

## 2024-10-10 PROCEDURE — 71045 X-RAY EXAM CHEST 1 VIEW: CPT

## 2024-10-10 PROCEDURE — 82550 ASSAY OF CK (CPK): CPT

## 2024-10-10 PROCEDURE — 85018 HEMOGLOBIN: CPT

## 2024-10-10 PROCEDURE — 99291 CRITICAL CARE FIRST HOUR: CPT

## 2024-10-10 RX ORDER — TORSEMIDE 10 MG/1
10 TABLET ORAL DAILY
Refills: 0 | Status: DISCONTINUED | OUTPATIENT
Start: 2024-10-10 | End: 2024-10-10

## 2024-10-10 RX ORDER — PSYLLIUM HUSK 0.4 G
1 CAPSULE ORAL
Qty: 30 | Refills: 0
Start: 2024-10-10 | End: 2024-11-08

## 2024-10-10 RX ORDER — TORSEMIDE 10 MG/1
1 TABLET ORAL
Qty: 30 | Refills: 0
Start: 2024-10-10 | End: 2024-11-08

## 2024-10-10 RX ADMIN — TORSEMIDE 10 MILLIGRAM(S): 10 TABLET ORAL at 12:48

## 2024-10-10 RX ADMIN — Medication 81 MILLIGRAM(S): at 12:41

## 2024-10-10 RX ADMIN — Medication 1 PATCH: at 12:48

## 2024-10-10 RX ADMIN — Medication 25 MILLIGRAM(S): at 05:27

## 2024-10-10 RX ADMIN — Medication 300 MILLIGRAM(S): at 14:21

## 2024-10-10 RX ADMIN — Medication 400 MILLIGRAM(S): at 12:42

## 2024-10-10 RX ADMIN — Medication 1 PATCH: at 07:42

## 2024-10-10 NOTE — PROGRESS NOTE ADULT - ASSESSMENT
64M current smoker,   CAD s/p stent to LAD,  chronic back pain , CANDELARIA , TIA , PAD s/p stent and bypass ,p/w chest pain x 1 day going for [facility] today
64M current smoker,   CAD s/p stent to LAD,  chronic back pain , CANDELARIA , TIA , PAD s/p stent and bypass ,p/w chest pain x 1 day going for [facility]

## 2024-10-10 NOTE — PROGRESS NOTE ADULT - SUBJECTIVE AND OBJECTIVE BOX
SUBJ:    MEDICATIONS  (STANDING):  aspirin enteric coated 81 milliGRAM(s) Oral daily  atorvastatin 80 milliGRAM(s) Oral at bedtime  famotidine    Tablet 40 milliGRAM(s) Oral once  influenza   Vaccine 0.5 milliLiter(s) IntraMuscular once  magnesium oxide 400 milliGRAM(s) Oral daily  metoprolol tartrate 25 milliGRAM(s) Oral two times a day  nicotine - 21 mG/24Hr(s) Patch 1 Patch Transdermal daily  sodium chloride 0.9%. 1000 milliLiter(s) (75 mL/Hr) IV Continuous <Continuous>  ticagrelor 90 milliGRAM(s) Oral two times a day    MEDICATIONS  (PRN):  acetaminophen     Tablet .. 650 milliGRAM(s) Oral every 6 hours PRN Temp greater or equal to 38C (100.4F), Mild Pain (1 - 3)  aluminum hydroxide/magnesium hydroxide/simethicone Suspension 30 milliLiter(s) Oral every 4 hours PRN Dyspepsia  melatonin 3 milliGRAM(s) Oral at bedtime PRN Insomnia  oxyCODONE    IR 10 milliGRAM(s) Oral two times a day PRN Severe Pain (7 - 10)      Vital Signs Last 24 Hrs  T(C): 36.8 (10 Oct 2024 04:32), Max: 36.8 (09 Oct 2024 21:10)  T(F): 98.2 (10 Oct 2024 04:32), Max: 98.3 (09 Oct 2024 21:10)  HR: 56 (10 Oct 2024 07:33) (54 - 82)  BP: 153/97 (10 Oct 2024 07:33) (103/60 - 166/86)  BP(mean): --  RR: 18 (10 Oct 2024 07:33) (17 - 20)  SpO2: 100% (10 Oct 2024 07:33) (96% - 100%)    Parameters below as of 10 Oct 2024 07:33  Patient On (Oxygen Delivery Method): room air        REVIEW OF SYSTEMS:  CONSTITUTIONAL: No fever, weight loss, or fatigue  EYES: No eye pain, visual disturbances, or discharge  ENMT:  No difficulty hearing, tinnitus, vertigo; No sinus or throat pain  NECK: No pain or stiffness  RESPIRATORY: No cough, wheezing, chills or hemoptysis; No shortness of breath  CARDIOVASCULAR: No chest pain, palpitations, dizziness, or leg swelling  GASTROINTESTINAL: No abdominal or epigastric pain. No nausea, vomiting, or hematemesis; No diarrhea or constipation. No melena or hematochezia.  GENITOURINARY: No dysuria, frequency, hematuria, or incontinence  NEUROLOGICAL: No headaches, memory loss, loss of strength, numbness, or tremors  SKIN: No itching, burning, rashes, or lesions   LYMPH NODES: No enlarged glands  ENDOCRINE: No heat or cold intolerance; No hair loss  MUSCULOSKELETAL: No joint pain or swelling; No muscle, back, or extremity pain  PSYCHIATRIC: No depression, anxiety, mood swings, or difficulty sleeping  HEME/LYMPH: No easy bruising, or bleeding gums  ALLERY AND IMMUNOLOGIC: No hives or eczema          PHYSICAL EXAM:  · CONSTITUTIONAL: Well-developed, well nourished      · EYES: EOMI; PERRL; no drainage or redness  · NECK: No bruits; no thyromegaly or nodules  ·RESPIRATORY:   airway patent; breath sounds equal; good air movement; respirations non-labored; clear to auscultation bilaterally; no chest wall tenderness; no intercostal retractions; no rales,rhonchi or wheeze  · CARDIOVASCULAR: regular rate and rhythm  no rub  no murmur  normal PMI  . GASTROINTESTINAL:  no distention; no masses palpable; bowel sounds normal  · EXTREMITIES: No cyanosis, clubbing or edema  · VASCULAR:  Equal and normal pulses (radial, femoral)  ·NEUROLOGICAL:  Alert and oriented x 3; sensation intact; deep reflexes intact; cranial nerves intact; no spontaneous movement; superficial reflexes intact; normal strength  · SKIN: No lesions; no rash  . LYMPH NODES: No lymphadedenopathy  · MUSCULOSKELETAL:  No calf tenderness  no joint swelling	    TELEMETRY:    ECG:    TTE:    LABS:   CBC Full  -  ( 10 Oct 2024 06:48 )  WBC Count : 10.77 K/uL  RBC Count : 4.60 M/uL  Hemoglobin : 14.4 g/dL  Hematocrit : 43.8 %  Platelet Count - Automated : 197 K/uL  Mean Cell Volume : 95.2 fl  Mean Cell Hemoglobin : 31.3 pg  Mean Cell Hemoglobin Concentration : 32.9 gm/dL  Auto Neutrophil # : x  Auto Lymphocyte # : x  Auto Monocyte # : x  Auto Eosinophil # : x  Auto Basophil # : x  Auto Neutrophil % : x  Auto Lymphocyte % : x  Auto Monocyte % : x  Auto Eosinophil % : x  Auto Basophil % : x      10-10    134[L]  |  96  |  24[H]  ----------------------------<  87  3.8   |  23  |  1.31[H]    Ca    9.6      10 Oct 2024 06:48  Phos  3.3     10-10  Mg     2.0     10-10    TPro  7.3  /  Alb  3.9  /  TBili  1.1  /  DBili  x   /  AST  15  /  ALT  18  /  AlkPhos  130[H]  10-09    CARDIAC MARKERS ( 08 Oct 2024 23:32 )  x     / x     / x     / x     / 1.3 ng/mL  CARDIAC MARKERS ( 08 Oct 2024 21:02 )  x     / x     / x     / x     / 1.3 ng/mL  CARDIAC MARKERS ( 08 Oct 2024 18:09 )  x     / x     / x     / x     / 1.7 ng/mL        RADIOLOGY & ADDITIONAL STUDIES:    IMPRESSION AND PLAN:        YESI CHARLES is a 64-year-old male active smoker with a past medical history significant for  CAD s/p stent to LAD  , COPD , chronic back pain , CANDELARIA , , h/o TIA , PAD s/p iliac stent and fem-pop bypass , h/o carotid endarterectomy , presents for chest pain for one day.   10/9 Severe distal RCA stenosis s/p successful FELISA x1. Patent LAD stents.  DAPT for 12 months  - Groin site stable.   - Continue DAPT (aspirin 81mg and Brilinta 90mg). 1st Rx month sent to [facility], please price check prior to discharge and send remaining 11 months to patients o/p pharmacy).  - Continue atorvastatin 80 mg  - Recommend a heart healthy diet with little or no salt and minimize processed foods  - Avoid using NSAIDs  (Aleve, Motrin, ibuprofen, naproxen) while on DAPT, please utilize Tylenol for pain control (not to exceed 4gm in 24 hours)  smoking cessation counselled     will optimize his medical therapy and plan for outpatient followup with Dr. Prieto next week    MEDICATIONS  for dc  aspirin enteric coated 81 milliGRAM(s) Oral daily  atorvastatin 80 milliGRAM(s) Oral at bedtime  metoprolol tartrate 25 milliGRAM(s) Oral two times a day  nicotine - 21 mG/24Hr(s) Patch 1 Patch Transdermal daily  sodium chloride 0.9%. 1000 milliLiter(s) (75 mL/Hr) IV Continuous <Continuous>  ticagrelor 90 milliGRAM(s) Oral two times a day  losartan 25mg daily          final cardiology recommendations above, please call with questions  Patient seen and examined with the fellow, the note above has been edited to reflect my independent history, physical exam, assessment and plan.      Cuba Mcdonald MD, PhD  Cardiology Attending  [facility]/ [facility]    For day time coverage Mon-Fri see Non-Service Consult Attending on [email or web address], password: [email or web address]; daytime weekends covered by general cardiology consult service attending.)   SUBJ:    MEDICATIONS  (STANDING):  aspirin enteric coated 81 milliGRAM(s) Oral daily  atorvastatin 80 milliGRAM(s) Oral at bedtime  famotidine    Tablet 40 milliGRAM(s) Oral once  influenza   Vaccine 0.5 milliLiter(s) IntraMuscular once  magnesium oxide 400 milliGRAM(s) Oral daily  metoprolol tartrate 25 milliGRAM(s) Oral two times a day  nicotine - 21 mG/24Hr(s) Patch 1 Patch Transdermal daily  sodium chloride 0.9%. 1000 milliLiter(s) (75 mL/Hr) IV Continuous <Continuous>  ticagrelor 90 milliGRAM(s) Oral two times a day    MEDICATIONS  (PRN):  acetaminophen     Tablet .. 650 milliGRAM(s) Oral every 6 hours PRN Temp greater or equal to 38C (100.4F), Mild Pain (1 - 3)  aluminum hydroxide/magnesium hydroxide/simethicone Suspension 30 milliLiter(s) Oral every 4 hours PRN Dyspepsia  melatonin 3 milliGRAM(s) Oral at bedtime PRN Insomnia  oxyCODONE    IR 10 milliGRAM(s) Oral two times a day PRN Severe Pain (7 - 10)      Vital Signs Last 24 Hrs  T(C): 36.8 (10 Oct 2024 04:32), Max: 36.8 (09 Oct 2024 21:10)  T(F): 98.2 (10 Oct 2024 04:32), Max: 98.3 (09 Oct 2024 21:10)  HR: 56 (10 Oct 2024 07:33) (54 - 82)  BP: 153/97 (10 Oct 2024 07:33) (103/60 - 166/86)  BP(mean): --  RR: 18 (10 Oct 2024 07:33) (17 - 20)  SpO2: 100% (10 Oct 2024 07:33) (96% - 100%)    Parameters below as of 10 Oct 2024 07:33  Patient On (Oxygen Delivery Method): room air        REVIEW OF SYSTEMS:   no CP, NO SOB  no bleeding        PHYSICAL EXAM:  PHYSICAL EXAM:-  GENERAL: NAD, well-developed  EYES: EOMI, PERRLA, conjunctiva and sclera clear  NECK: Supple, No JVD, no thyromegaly  CHEST/LUNG: Clear to auscultation bilaterally; No wheeze  HEART: Regular rate and rhythm; S1, S2 audible, No murmurs, rubs, or gallops  ABDOMEN: Soft, Nontender, Nondistended; Bowel sounds present  EXTREMITIES:  2+ Peripheral Pulses, No clubbing, cyanosis, or edema  NEURO: AAOx3, no focal deficit    TELEMETRY:    ECG:    TTE:images reviewed , elevated filling pressures basal and inf WMA    LABS:   CBC Full  -  ( 10 Oct 2024 06:48 )  WBC Count : 10.77 K/uL  RBC Count : 4.60 M/uL  Hemoglobin : 14.4 g/dL  Hematocrit : 43.8 %  Platelet Count - Automated : 197 K/uL  Mean Cell Volume : 95.2 fl  Mean Cell Hemoglobin : 31.3 pg  Mean Cell Hemoglobin Concentration : 32.9 gm/dL  Auto Neutrophil # : x  Auto Lymphocyte # : x  Auto Monocyte # : x  Auto Eosinophil # : x  Auto Basophil # : x  Auto Neutrophil % : x  Auto Lymphocyte % : x  Auto Monocyte % : x  Auto Eosinophil % : x  Auto Basophil % : x      10-10    134[L]  |  96  |  24[H]  ----------------------------<  87  3.8   |  23  |  1.31[H]    Ca    9.6      10 Oct 2024 06:48  Phos  3.3     10-10  Mg     2.0     10-10    TPro  7.3  /  Alb  3.9  /  TBili  1.1  /  DBili  x   /  AST  15  /  ALT  18  /  AlkPhos  130[H]  10-09    CARDIAC MARKERS ( 08 Oct 2024 23:32 )  x     / x     / x     / x     / 1.3 ng/mL  CARDIAC MARKERS ( 08 Oct 2024 21:02 )  x     / x     / x     / x     / 1.3 ng/mL  CARDIAC MARKERS ( 08 Oct 2024 18:09 )  x     / x     / x     / x     / 1.7 ng/mL        RADIOLOGY & ADDITIONAL STUDIES:    IMPRESSION AND PLAN:        YESI CHARLES is a 64-year-old male active smoker with a past medical history significant for  CAD s/p stent to LAD  , COPD , chronic back pain , CANDELARIA , , h/o TIA , PAD s/p iliac stent and fem-pop bypass , h/o carotid endarterectomy , presents for chest pain for one day.   10/9 Severe distal RCA stenosis s/p successful FELISA x1. Patent LAD stents.  DAPT for 12 months  - Groin site stable.   - Continue DAPT (aspirin 81mg and Brilinta 90mg). 1st Rx month sent to [facility], please price check prior to discharge and send remaining 11 months to patients o/p pharmacy).  - Continue atorvastatin 80 mg  - Recommend a heart healthy diet with little or no salt and minimize processed foods  - Avoid using NSAIDs  (Aleve, Motrin, ibuprofen, naproxen) while on DAPT, please utilize Tylenol for pain control (not to exceed 4gm in 24 hours)  smoking cessation counselled  - give 40mg IV lasix once now       will optimize his medical therapy and plan for outpatient followup with Dr. Prieto next week    MEDICATIONS  for dc  aspirin enteric coated 81 milliGRAM(s) Oral daily  atorvastatin 80 milliGRAM(s) Oral at bedtime  metoprolol tartrate 25 milliGRAM(s) Oral two times a day  nicotine - 21 mG/24Hr(s) Patch 1 Patch Transdermal daily  sodium chloride 0.9%. 1000 milliLiter(s) (75 mL/Hr) IV Continuous <Continuous>  ticagrelor 90 milliGRAM(s) Oral two times a day  losartan 25mg daily          final cardiology recommendations above, please call with questions  Patient seen and examined with the fellow, the note above has been edited to reflect my independent history, physical exam, assessment and plan.      Cuba Mcdonald MD, PhD  Cardiology Attending  [facility]/ [facility]    For day time coverage Mon-Fri see Non-Service Consult Attending on [email or web address], password: [email or web address]; daytime weekends covered by general cardiology consult service attending.)   SUBJ:    MEDICATIONS  (STANDING):  aspirin enteric coated 81 milliGRAM(s) Oral daily  atorvastatin 80 milliGRAM(s) Oral at bedtime  famotidine    Tablet 40 milliGRAM(s) Oral once  influenza   Vaccine 0.5 milliLiter(s) IntraMuscular once  magnesium oxide 400 milliGRAM(s) Oral daily  metoprolol tartrate 25 milliGRAM(s) Oral two times a day  nicotine - 21 mG/24Hr(s) Patch 1 Patch Transdermal daily  sodium chloride 0.9%. 1000 milliLiter(s) (75 mL/Hr) IV Continuous <Continuous>  ticagrelor 90 milliGRAM(s) Oral two times a day    MEDICATIONS  (PRN):  acetaminophen     Tablet .. 650 milliGRAM(s) Oral every 6 hours PRN Temp greater or equal to 38C (100.4F), Mild Pain (1 - 3)  aluminum hydroxide/magnesium hydroxide/simethicone Suspension 30 milliLiter(s) Oral every 4 hours PRN Dyspepsia  melatonin 3 milliGRAM(s) Oral at bedtime PRN Insomnia  oxyCODONE    IR 10 milliGRAM(s) Oral two times a day PRN Severe Pain (7 - 10)      Vital Signs Last 24 Hrs  T(C): 36.8 (10 Oct 2024 04:32), Max: 36.8 (09 Oct 2024 21:10)  T(F): 98.2 (10 Oct 2024 04:32), Max: 98.3 (09 Oct 2024 21:10)  HR: 56 (10 Oct 2024 07:33) (54 - 82)  BP: 153/97 (10 Oct 2024 07:33) (103/60 - 166/86)  BP(mean): --  RR: 18 (10 Oct 2024 07:33) (17 - 20)  SpO2: 100% (10 Oct 2024 07:33) (96% - 100%)    Parameters below as of 10 Oct 2024 07:33  Patient On (Oxygen Delivery Method): room air        REVIEW OF SYSTEMS:   no CP, NO SOB  no bleeding        PHYSICAL EXAM:  PHYSICAL EXAM:-  GENERAL: NAD, well-developed  EYES: EOMI, PERRLA, conjunctiva and sclera clear  NECK: Supple, No JVD, no thyromegaly  CHEST/LUNG: Clear to auscultation bilaterally; No wheeze  HEART: Regular rate and rhythm; S1, S2 audible, No murmurs, rubs, or gallops  ABDOMEN: Soft, Nontender, Nondistended; Bowel sounds present  EXTREMITIES:  2+ Peripheral Pulses, No clubbing, cyanosis, or edema  NEURO: AAOx3, no focal deficit    TELEMETRY:    ECG:    TTE:images reviewed , elevated filling pressures basal and inf WMA    LABS:   CBC Full  -  ( 10 Oct 2024 06:48 )  WBC Count : 10.77 K/uL  RBC Count : 4.60 M/uL  Hemoglobin : 14.4 g/dL  Hematocrit : 43.8 %  Platelet Count - Automated : 197 K/uL  Mean Cell Volume : 95.2 fl  Mean Cell Hemoglobin : 31.3 pg  Mean Cell Hemoglobin Concentration : 32.9 gm/dL  Auto Neutrophil # : x  Auto Lymphocyte # : x  Auto Monocyte # : x  Auto Eosinophil # : x  Auto Basophil # : x  Auto Neutrophil % : x  Auto Lymphocyte % : x  Auto Monocyte % : x  Auto Eosinophil % : x  Auto Basophil % : x      10-10    134[L]  |  96  |  24[H]  ----------------------------<  87  3.8   |  23  |  1.31[H]    Ca    9.6      10 Oct 2024 06:48  Phos  3.3     10-10  Mg     2.0     10-10    TPro  7.3  /  Alb  3.9  /  TBili  1.1  /  DBili  x   /  AST  15  /  ALT  18  /  AlkPhos  130[H]  10-09    CARDIAC MARKERS ( 08 Oct 2024 23:32 )  x     / x     / x     / x     / 1.3 ng/mL  CARDIAC MARKERS ( 08 Oct 2024 21:02 )  x     / x     / x     / x     / 1.3 ng/mL  CARDIAC MARKERS ( 08 Oct 2024 18:09 )  x     / x     / x     / x     / 1.7 ng/mL        RADIOLOGY & ADDITIONAL STUDIES:    IMPRESSION AND PLAN:        YESI CHARLES is a 64-year-old male active smoker with a past medical history significant for  CAD s/p stent to LAD  , COPD , chronic back pain , CANDELARIA , , h/o TIA , PAD s/p iliac stent and fem-pop bypass , h/o carotid endarterectomy , presents for chest pain for one day.   10/9 Severe distal RCA stenosis s/p successful FELISA x1. Patent LAD stents.  DAPT for 12 months  - Groin site stable.   - Continue DAPT (aspirin 81mg and Brilinta 90mg). 1st Rx month sent to [facility], please price check prior to discharge and send remaining 11 months to patients o/p pharmacy).  - Continue atorvastatin 80 mg  - Recommend a heart healthy diet with little or no salt and minimize processed foods  - Avoid using NSAIDs  (Aleve, Motrin, ibuprofen, naproxen) while on DAPT, please utilize Tylenol for pain control (not to exceed 4gm in 24 hours)  smoking cessation counselled  - give 40mg IV lasix once now       will optimize his medical therapy and plan for outpatient followup with Dr. Prieto next week    MEDICATIONS  for dc  aspirin enteric coated 81 milliGRAM(s) Oral daily  atorvastatin 80 milliGRAM(s) Oral at bedtime  metoprolol tartrate 25 milliGRAM(s) Oral two times a day  nicotine - 21 mG/24Hr(s) Patch 1 Patch Transdermal daily  sodium chloride 0.9%. 1000 milliLiter(s) (75 mL/Hr) IV Continuous <Continuous>  ticagrelor 90 milliGRAM(s) Oral two times a day  losartan 25mg daily    torsemide 10mg daily       final cardiology recommendations above, please call with questions  Patient seen and examined with the fellow, the note above has been edited to reflect my independent history, physical exam, assessment and plan.      Cuba Mcdonald MD, PhD  Cardiology Attending  [facility]/ [facility]    For day time coverage Mon-Fri see Non-Service Consult Attending on [email or web address], password: [email or web address]; daytime weekends covered by general cardiology consult service attending.)

## 2024-10-10 NOTE — PROGRESS NOTE ADULT - PROBLEM SELECTOR PLAN 3
reported flash pulm edema at [place] , started on torsemide , here briefly hypotensive s/p Fluids   - hold torsemide, re-eval fluid status in AM  - f/u echo
Reported flash pulm edema at [place] , started on torsemide , here briefly hypotensive s/p Fluids   - hold torsemide  - f/u echo

## 2024-10-10 NOTE — PROGRESS NOTE ADULT - PROBLEM SELECTOR PLAN 2
Known CAD s/p stent to LAD  - now, s/p PCI FELISA to RCA  - plan as above, outpatient cardiology f/u
- as above

## 2024-10-10 NOTE — PROGRESS NOTE ADULT - SUBJECTIVE AND OBJECTIVE BOX
Interventional Cardiology Post Cath Progress Note:                Subjective: Patient feels well- no current complaints- Denies chest pain, shortness of breath. Denies pain, numbness, tingling or swelling around (groin/wrist) access site    MEDICATIONS  (STANDING):  aspirin enteric coated 81 milliGRAM(s) Oral daily  atorvastatin 80 milliGRAM(s) Oral at bedtime  famotidine    Tablet 40 milliGRAM(s) Oral once  influenza   Vaccine 0.5 milliLiter(s) IntraMuscular once  magnesium oxide 400 milliGRAM(s) Oral daily  metoprolol tartrate 25 milliGRAM(s) Oral two times a day  nicotine - 21 mG/24Hr(s) Patch 1 Patch Transdermal daily  sodium chloride 0.9%. 1000 milliLiter(s) (75 mL/Hr) IV Continuous <Continuous>  ticagrelor 90 milliGRAM(s) Oral two times a day    MEDICATIONS  (PRN):  acetaminophen     Tablet .. 650 milliGRAM(s) Oral every 6 hours PRN Temp greater or equal to 38C (100.4F), Mild Pain (1 - 3)  aluminum hydroxide/magnesium hydroxide/simethicone Suspension 30 milliLiter(s) Oral every 4 hours PRN Dyspepsia  melatonin 3 milliGRAM(s) Oral at bedtime PRN Insomnia  oxyCODONE    IR 10 milliGRAM(s) Oral two times a day PRN Severe Pain (7 - 10)      Objective:  Vital Signs Last 24 Hrs  T(C): 36.8 (10 Oct 2024 04:32), Max: 36.8 (09 Oct 2024 21:10)  T(F): 98.2 (10 Oct 2024 04:32), Max: 98.3 (09 Oct 2024 21:10)  HR: 56 (10 Oct 2024 07:33) (54 - 82)  BP: 153/97 (10 Oct 2024 07:33) (103/60 - 166/86)  BP(mean): --  RR: 18 (10 Oct 2024 07:33) (17 - 20)  SpO2: 100% (10 Oct 2024 07:33) (96% - 100%)    Parameters below as of 10 Oct 2024 07:33  Patient On (Oxygen Delivery Method): room air      10-09-24 @ 07:01  -  10-10-24 @ 07:00  --------------------------------------------------------  IN: 0 mL / OUT: 250 mL / NET: -250 mL                          14.4   10.77 )-----------( 197      ( 10 Oct 2024 06:48 )             43.8     10-10    134[L]  |  96  |  24[H]  ----------------------------<  87  3.8   |  23  |  1.31[H]    Ca    9.6      10 Oct 2024 06:48  Phos  3.3     10-10  Mg     2.0     10-10    TPro  7.3  /  Alb  3.9  /  TBili  1.1  /  DBili  x   /  AST  15  /  ALT  18  /  AlkPhos  130[H]  10-09    PT/INR - ( 08 Oct 2024 18:09 )   PT: 11.1 sec;   INR: 0.97 ratio         PTT - ( 09 Oct 2024 09:56 )  PTT:35.7 sec  Urinalysis Basic - ( 10 Oct 2024 06:48 )    Color: x / Appearance: x / SG: x / pH: x  Gluc: 87 mg/dL / Ketone: x  / Bili: x / Urobili: x   Blood: x / Protein: x / Nitrite: x   Leuk Esterase: x / RBC: x / WBC x   Sq Epi: x / Non Sq Epi: x / Bacteria: x    Cardiac Catheterization (10.09.24 @ 13:12) >  Procedures Performed   Procedures:                 1.    Arterial Access - Right Femoral   2.    Diagnostic Coronary Angiography   3.    Ultrasound Guided Access   4.    PCI: FELISA     Indications: ACS greater than 24 hours   PCI Status: elective     Conclusions: Severe distal RCA stenosis s/p successful FELISA x1. Patent LAD stents.      Recommendations: DAPT for 12 months, optimize risk factors.      Presentation: This is a 65yo Male with tobacco use, CAD with prior stents, TIA, PAD, presented with chest pain and elevated troponin.      Procedure Narrative:  The risks and alternatives of the procedures and conscious sedation were explained to the patient and informed consent was obtained. The patient was brought to the cath lab and placed on the exam table.  Access Right femoral artery: The puncture site was infiltrated with 1% Lidocaine. Vascular access was obtained using modified seldinger technique.  Hemostasis/Sheath Status: The sheath was sutured in place.      Coronary Angiography   Left Coronary System: A DXTERITY 5FR JL4 was positioned into the vessel ostia under fluoroscopic guidance. Contrast injections were performed using power injection. Angiograms were obtained in multiple views.  Right Coronary System:  A DXTERITY 5FR JR4 was positioned into the vessel ostia under fluoroscopic guidance. Contrast injections were performed using power injection. Angiograms were obtained in multiple views.   Diagnostic Findings:  Coronary Angiography  The coronary circulation is right dominant. Cardiac catheterization was performed electively.  LM Left main artery: Angiography shows minor luminal irregularities.    LAD Ostial left anterior descending: There is a 50 % stenosis. Proximal left anterior descending: Patent stents.. Mid left anterior descending: Angiography shows moderate atherosclerosis.    CX Circumflex: Angiography shows mild atherosclerosis.    RCA Distal right coronary artery: There is a 99 % stenosis.      Interventional Findings:   Interventional Details Distal right coronary artery: This was a 99 % De Chandana stenosis. Guidewire crossing was successful.  A successful Drug Eluting Stent was deployed using a JR 4.0 LAUNCHER 5FR, a EARL [id number], and a FRONTIER RX 2.75 X 18.  The inflation pressure was 16 isaias for the duration of 9.0 seconds.  Following intervention there is a 1 % residual stenosis. There was KHRIS Flow 3 before the procedure and KHRIS Flow 3 following the procedure. Following intervention there is an excellent angiographic appearance and no evidence of thrombus.      Physical Exam:  No apparent distress, alert and oriented times three, appropriate affect  JVD is not elevated, supple  Clear to auscultation with no wheezing, ronchi or crackles  Regular rate and rhythm with no murmur, rub or gallop  Positive bowel sounds, soft, non-tender, non-distended, no masses/guarding or rebound tenderness  Right groin: Soft, non tender, no bleeding or hematoma, clean/dry/intact- RLE/LLE +2 (palpable femoral pulse).  No clubbing, cyanosis or edema      Assessment/Plan:   Patient is a 64-year-old male active smoker with a past medical history significant for  CAD s/p stent to LAD  , COPD , chronic back pain , CANDELARIA , , h/o TIA , PAD s/p iliac stent and fem-pop bypass , h/o carotid endarterectomy , presents for chest pain for one day.   10/9 Severe distal RCA stenosis s/p successful FELISA x1. Patent LAD stents.  DAPT for 12 months  - Groin site stable.   - Continue DAPT (aspirin 81mg and Brilinta 90mg)  - Continue atorvastatin 80 mg  - Recommend a heart healthy diet which includes a variety of fruits and vegetables, whole grains, low fat dairy products, legumes and skinless poulty and fish; food prepared with little or no salt and minimize processed foods  - Avoid using NSAIDs  (Aleve, Motrin, ibuprofen, naproxen) while on DAPT, please utilize Tylenol for pain control (not to exceed 4gm in 24 hours)  - Care per primary team and House Cards Consult.      Michelle Chapman, WALLY  Interventional Cardiology.                                             Interventional Cardiology Post Cath Progress Note:                Subjective: Patient feels well- no current complaints- Denies chest pain, shortness of breath. Denies pain, numbness, tingling or swelling around (groin/wrist) access site    MEDICATIONS  (STANDING):  aspirin enteric coated 81 milliGRAM(s) Oral daily  atorvastatin 80 milliGRAM(s) Oral at bedtime  famotidine    Tablet 40 milliGRAM(s) Oral once  influenza   Vaccine 0.5 milliLiter(s) IntraMuscular once  magnesium oxide 400 milliGRAM(s) Oral daily  metoprolol tartrate 25 milliGRAM(s) Oral two times a day  nicotine - 21 mG/24Hr(s) Patch 1 Patch Transdermal daily  sodium chloride 0.9%. 1000 milliLiter(s) (75 mL/Hr) IV Continuous <Continuous>  ticagrelor 90 milliGRAM(s) Oral two times a day    MEDICATIONS  (PRN):  acetaminophen     Tablet .. 650 milliGRAM(s) Oral every 6 hours PRN Temp greater or equal to 38C (100.4F), Mild Pain (1 - 3)  aluminum hydroxide/magnesium hydroxide/simethicone Suspension 30 milliLiter(s) Oral every 4 hours PRN Dyspepsia  melatonin 3 milliGRAM(s) Oral at bedtime PRN Insomnia  oxyCODONE    IR 10 milliGRAM(s) Oral two times a day PRN Severe Pain (7 - 10)      Objective:  Vital Signs Last 24 Hrs  T(C): 36.8 (10 Oct 2024 04:32), Max: 36.8 (09 Oct 2024 21:10)  T(F): 98.2 (10 Oct 2024 04:32), Max: 98.3 (09 Oct 2024 21:10)  HR: 56 (10 Oct 2024 07:33) (54 - 82)  BP: 153/97 (10 Oct 2024 07:33) (103/60 - 166/86)  BP(mean): --  RR: 18 (10 Oct 2024 07:33) (17 - 20)  SpO2: 100% (10 Oct 2024 07:33) (96% - 100%)    Parameters below as of 10 Oct 2024 07:33  Patient On (Oxygen Delivery Method): room air      10-09-24 @ 07:01  -  10-10-24 @ 07:00  --------------------------------------------------------  IN: 0 mL / OUT: 250 mL / NET: -250 mL                          14.4   10.77 )-----------( 197      ( 10 Oct 2024 06:48 )             43.8     10-10    134[L]  |  96  |  24[H]  ----------------------------<  87  3.8   |  23  |  1.31[H]    Ca    9.6      10 Oct 2024 06:48  Phos  3.3     10-10  Mg     2.0     10-10    TPro  7.3  /  Alb  3.9  /  TBili  1.1  /  DBili  x   /  AST  15  /  ALT  18  /  AlkPhos  130[H]  10-09    PT/INR - ( 08 Oct 2024 18:09 )   PT: 11.1 sec;   INR: 0.97 ratio         PTT - ( 09 Oct 2024 09:56 )  PTT:35.7 sec  Urinalysis Basic - ( 10 Oct 2024 06:48 )    Color: x / Appearance: x / SG: x / pH: x  Gluc: 87 mg/dL / Ketone: x  / Bili: x / Urobili: x   Blood: x / Protein: x / Nitrite: x   Leuk Esterase: x / RBC: x / WBC x   Sq Epi: x / Non Sq Epi: x / Bacteria: x    Cardiac Catheterization (10.09.24 @ 13:12) >  Procedures Performed   Procedures:                 1.    Arterial Access - Right Femoral   2.    Diagnostic Coronary Angiography   3.    Ultrasound Guided Access   4.    PCI: FELISA     Indications: ACS greater than 24 hours   PCI Status: elective     Conclusions: Severe distal RCA stenosis s/p successful FELISA x1. Patent LAD stents.      Recommendations: DAPT for 12 months, optimize risk factors.      Presentation: This is a 63yo Male with tobacco use, CAD with prior stents, TIA, PAD, presented with chest pain and elevated troponin.      Procedure Narrative:  The risks and alternatives of the procedures and conscious sedation were explained to the patient and informed consent was obtained. The patient was brought to the cath lab and placed on the exam table.  Access Right femoral artery: The puncture site was infiltrated with 1% Lidocaine. Vascular access was obtained using modified seldinger technique.  Hemostasis/Sheath Status: The sheath was sutured in place.      Coronary Angiography   Left Coronary System: A DXTERITY 5FR JL4 was positioned into the vessel ostia under fluoroscopic guidance. Contrast injections were performed using power injection. Angiograms were obtained in multiple views.  Right Coronary System:  A DXTERITY 5FR JR4 was positioned into the vessel ostia under fluoroscopic guidance. Contrast injections were performed using power injection. Angiograms were obtained in multiple views.   Diagnostic Findings:  Coronary Angiography  The coronary circulation is right dominant. Cardiac catheterization was performed electively.  LM Left main artery: Angiography shows minor luminal irregularities.    LAD Ostial left anterior descending: There is a 50 % stenosis. Proximal left anterior descending: Patent stents.. Mid left anterior descending: Angiography shows moderate atherosclerosis.    CX Circumflex: Angiography shows mild atherosclerosis.    RCA Distal right coronary artery: There is a 99 % stenosis.      Interventional Findings:   Interventional Details Distal right coronary artery: This was a 99 % De Chandana stenosis. Guidewire crossing was successful.  A successful Drug Eluting Stent was deployed using a JR 4.0 LAUNCHER 5FR, a EARL [id number], and a FRONTIER RX 2.75 X 18.  The inflation pressure was 16 isaias for the duration of 9.0 seconds.  Following intervention there is a 1 % residual stenosis. There was KHRIS Flow 3 before the procedure and KHRIS Flow 3 following the procedure. Following intervention there is an excellent angiographic appearance and no evidence of thrombus.      Physical Exam:  No apparent distress, alert and oriented times three, appropriate affect  JVD is not elevated, supple  Clear to auscultation with no wheezing, ronchi or crackles  Regular rate and rhythm with no murmur, rub or gallop  Positive bowel sounds, soft, non-tender, non-distended, no masses/guarding or rebound tenderness  Right groin: Soft, non tender, no bleeding or hematoma, clean/dry/intact- RLE/LLE +2 (palpable femoral pulse).  No clubbing, cyanosis or edema      Assessment/Plan:   Patient is a 64-year-old male active smoker with a past medical history significant for  CAD s/p stent to LAD  , COPD , chronic back pain , CANDELARIA , , h/o TIA , PAD s/p iliac stent and fem-pop bypass , h/o carotid endarterectomy , presents for chest pain for one day.   10/9 Severe distal RCA stenosis s/p successful FELISA x1. Patent LAD stents.  DAPT for 12 months  - Groin site stable.   - Continue DAPT (aspirin 81mg and Brilinta 90mg). 1st Rx month sent to [facility], please price check prior to discharge and send remaining 11 months to patients o/p pharmacy).  - Continue atorvastatin 80 mg  - Recommend a heart healthy diet which includes a variety of fruits and vegetables, whole grains, low fat dairy products, legumes and skinless poulty and fish; food prepared with little or no salt and minimize processed foods  - Avoid using NSAIDs  (Aleve, Motrin, ibuprofen, naproxen) while on DAPT, please utilize Tylenol for pain control (not to exceed 4gm in 24 hours)  - Care per primary team and House Cards Consult.      Michelle Chapman, WALLY  Interventional Cardiology.

## 2024-10-10 NOTE — PROGRESS NOTE ADULT - PROBLEM SELECTOR PLAN 1
Cards following, dynamic T-wave changes, chest pain persistent,   - started on heparin gtt and Brilinta loaded   - s/p fentanyl x 2 doses , Morphine PRN for breakthrough pain  - s/p LHC- RCA 99%, s/p FELISA, LAD 50%  - c/w DAPT and Statin   - TTE pending  - awaiting on Cardiology plans for dispo Cards following, dynamic T-wave changes, chest pain persistent,   - started on heparin gtt and Brilinta loaded   - s/p fentanyl x 2 doses , Morphine PRN for breakthrough pain  - s/p LHC- RCA 99%, s/p FELISA, LAD 50%  - c/w DAPT and Statin   - spoke to Cardiology attending- d/c on Torsemide 10mg/d as echo shows-   Left ventricular systolic function is normal with an ejection fraction of 69 %, basal anid inferior wall is abnormal.  moderate (grade 2) left ventricular diastolic dysfunction, with elevated left ventricular filling pressure. Moderate to severe mitral regurgitation at a blood pressure of 123/71 mmHg.

## 2024-10-10 NOTE — DISCHARGE NOTE NURSING/CASE MANAGEMENT/SOCIAL WORK - NSDCPNINST_GEN_ALL_CORE
Call and make follow up appointments with MD after discharged. Check groin site for hardness, leg numbness, pain, swelling, bleeding or hematoma. Return to the nearest emergency room for worsening chest pain, difficulty breathing.

## 2024-10-10 NOTE — DISCHARGE NOTE NURSING/CASE MANAGEMENT/SOCIAL WORK - PATIENT PORTAL LINK FT
You can access the FollowMyHealth Patient Portal offered by [facility] by registering at the following website: [email or web address]. By joining [facility]’s FollowMyHealth portal, you will also be able to view your health information using other applications (apps) compatible with our system.

## 2024-10-10 NOTE — PROGRESS NOTE ADULT - SUBJECTIVE AND OBJECTIVE BOX
Mohsin Khan, MD  Attending Physician, Division Of Hospital Medicine  Office: [phone]  Available on [employer] Teams    Patient is a 64y old  Male who presents with a chief complaint of chest pain    SUBJECTIVE / OVERNIGHT EVENTS:  seen, examined the patient this am  Feels ok, no chest pain, SOB, Tele- no events, VSS    MEDICATIONS  (STANDING):  aspirin enteric coated 81 milliGRAM(s) Oral daily  atorvastatin 80 milliGRAM(s) Oral at bedtime  famotidine    Tablet 40 milliGRAM(s) Oral once  influenza   Vaccine 0.5 milliLiter(s) IntraMuscular once  magnesium oxide 400 milliGRAM(s) Oral daily  metoprolol tartrate 25 milliGRAM(s) Oral two times a day  nicotine - 21 mG/24Hr(s) Patch 1 Patch Transdermal daily  sodium chloride 0.9%. 1000 milliLiter(s) (75 mL/Hr) IV Continuous <Continuous>  ticagrelor 90 milliGRAM(s) Oral two times a day    MEDICATIONS  (PRN):  acetaminophen     Tablet .. 650 milliGRAM(s) Oral every 6 hours PRN Temp greater or equal to 38C (100.4F), Mild Pain (1 - 3)  aluminum hydroxide/magnesium hydroxide/simethicone Suspension 30 milliLiter(s) Oral every 4 hours PRN Dyspepsia  melatonin 3 milliGRAM(s) Oral at bedtime PRN Insomnia  oxyCODONE    IR 10 milliGRAM(s) Oral two times a day PRN Severe Pain (7 - 10)      Vital Signs Last 24 Hrs  T(C): 36.8 (10 Oct 2024 04:32), Max: 36.8 (09 Oct 2024 21:10)  T(F): 98.2 (10 Oct 2024 04:32), Max: 98.3 (09 Oct 2024 21:10)  HR: 56 (10 Oct 2024 07:33) (54 - 82)  BP: 153/97 (10 Oct 2024 07:33) (103/60 - 166/86)  BP(mean): --  RR: 18 (10 Oct 2024 07:33) (17 - 20)  SpO2: 100% (10 Oct 2024 07:33) (96% - 100%)    Parameters below as of 10 Oct 2024 07:33  Patient On (Oxygen Delivery Method): room air      CAPILLARY BLOOD GLUCOSE      POCT Blood Glucose.: 88 mg/dL (09 Oct 2024 14:05)    I&O's Summary    09 Oct 2024 07:01  -  10 Oct 2024 07:00  --------------------------------------------------------  IN: 0 mL / OUT: 250 mL / NET: -250 mL        PHYSICAL EXAM:-  GENERAL: NAD, well-developed  EYES: EOMI, PERRLA, conjunctiva and sclera clear  NECK: Supple, No JVD, no thyromegaly  CHEST/LUNG: Clear to auscultation bilaterally; No wheeze  HEART: Regular rate and rhythm; S1, S2 audible, No murmurs, rubs, or gallops  ABDOMEN: Soft, Nontender, Nondistended; Bowel sounds present  EXTREMITIES:  2+ Peripheral Pulses, No clubbing, cyanosis, or edema  NEURO: AAOx3, no focal deficit      LABS:                        14.4   10.77 )-----------( 197      ( 10 Oct 2024 06:48 )             43.8     10-10    134[L]  |  96  |  24[H]  ----------------------------<  87  3.8   |  23  |  1.31[H]    Ca    9.6      10 Oct 2024 06:48  Phos  3.3     10-10  Mg     2.0     10-10    TPro  7.3  /  Alb  3.9  /  TBili  1.1  /  DBili  x   /  AST  15  /  ALT  18  /  AlkPhos  130[H]  10-09    PT/INR - ( 08 Oct 2024 18:09 )   PT: 11.1 sec;   INR: 0.97 ratio         PTT - ( 09 Oct 2024 09:56 )  PTT:35.7 sec  CARDIAC MARKERS ( 08 Oct 2024 23:32 )  x     / x     / x     / x     / 1.3 ng/mL  CARDIAC MARKERS ( 08 Oct 2024 21:02 )  x     / x     / x     / x     / 1.3 ng/mL  CARDIAC MARKERS ( 08 Oct 2024 18:09 )  x     / x     / x     / x     / 1.7 ng/mL      Urinalysis Basic - ( 10 Oct 2024 06:48 )    Color: x / Appearance: x / SG: x / pH: x  Gluc: 87 mg/dL / Ketone: x  / Bili: x / Urobili: x   Blood: x / Protein: x / Nitrite: x   Leuk Esterase: x / RBC: x / WBC x   Sq Epi: x / Non Sq Epi: x / Bacteria: x        RADIOLOGY & ADDITIONAL TESTS:    Imaging Personally Reviewed:  Consultant(s) Notes Reviewed:    Care Discussed with Consultants/Other Providers:

## 2024-10-10 NOTE — DISCHARGE NOTE NURSING/CASE MANAGEMENT/SOCIAL WORK - NSDCPEFALRISK_GEN_ALL_CORE
For information on Fall & Injury Prevention, visit: [email or web address] OR  [email or web address] OR  https://www.cdc.gov/steadi/patient.html

## 2024-10-10 NOTE — PROGRESS NOTE ADULT - PROBLEM SELECTOR PLAN 4
Scr 1.3 now from 2  - Off Diuretic
Scr 2 , no recent baseline for comparison   -renal dose medications  - monitor ins and outs  - monitor creatinine   - avoid nephrotoxins

## 2024-10-10 NOTE — DISCHARGE NOTE NURSING/CASE MANAGEMENT/SOCIAL WORK - NSDCFUADDAPPT_GEN_ALL_CORE_FT
APPTS ARE READY TO BE MADE: [x] YES    Best Family or Patient Contact (if needed):    Additional Information about above appointments (if needed):    1: [facility]  2:   3:     Other comments or requests:

## 2024-10-10 NOTE — DISCHARGE NOTE NURSING/CASE MANAGEMENT/SOCIAL WORK - NSDCVIVACCINE_GEN_ALL_CORE_FT
Influenza, seasonal, injectable; 02-Jan-2014 13:25; Yamel Henriquez (RN); (L) [id number]; IntraMuscular; Deltoid Left.; 0.5 milliLiter(s);   influenza, injectable, quadrivalent, preservative free; 14-Feb-2019 14:47; Roxana Anders (RN); [employer]; in482ow (Exp. Date: 30-Jun-2019); IntraMuscular; Deltoid Right.; 0.5 milliLiter(s); VIS (VIS Published: 07-Aug-2015, VIS Presented: 14-Feb-2019);

## 2024-10-11 ENCOUNTER — NON-APPOINTMENT (OUTPATIENT)
Age: 64
End: 2024-10-11

## 2024-10-14 ENCOUNTER — APPOINTMENT (OUTPATIENT)
Dept: CARDIOLOGY | Facility: CLINIC | Age: 64
End: 2024-10-14
Payer: COMMERCIAL

## 2024-10-14 ENCOUNTER — NON-APPOINTMENT (OUTPATIENT)
Age: 64
End: 2024-10-14

## 2024-10-14 VITALS
OXYGEN SATURATION: 100 % | SYSTOLIC BLOOD PRESSURE: 126 MMHG | RESPIRATION RATE: 16 BRPM | HEIGHT: 75 IN | BODY MASS INDEX: 27.35 KG/M2 | WEIGHT: 220 LBS | DIASTOLIC BLOOD PRESSURE: 80 MMHG | HEART RATE: 60 BPM

## 2024-10-14 DIAGNOSIS — I50.31 ACUTE DIASTOLIC (CONGESTIVE) HEART FAILURE: ICD-10-CM

## 2024-10-14 DIAGNOSIS — I21.4 NON-ST ELEVATION (NSTEMI) MYOCARDIAL INFARCTION: ICD-10-CM

## 2024-10-14 DIAGNOSIS — I25.10 ATHEROSCLEROTIC HEART DISEASE OF NATIVE CORONARY ARTERY W/OUT ANGINA PECTORIS: ICD-10-CM

## 2024-10-14 PROCEDURE — G2211 COMPLEX E/M VISIT ADD ON: CPT | Mod: NC

## 2024-10-14 PROCEDURE — 93000 ELECTROCARDIOGRAM COMPLETE: CPT

## 2024-10-14 PROCEDURE — 99214 OFFICE O/P EST MOD 30 MIN: CPT

## 2024-10-14 NOTE — DISCUSSION/SUMMARY
[FreeTextEntry1] : Recuperating s/p RCA stent.  Moderate to severe MR. ? moderate MS on [place] echo. continue torsemide 10-20 mg, Repeat echo in 2-3 months. [EKG obtained to assist in diagnosis and management of assessed problem(s)] : EKG obtained to assist in diagnosis and management of assessed problem(s)

## 2024-10-14 NOTE — HISTORY OF PRESENT ILLNESS
[FreeTextEntry1] : Patient with severe PAD who recently presented with unstable angina and transient heart failure.  On cath a severe right coronary stenosis found and successfully stented.  He has no recurrent chest pain but still has some HERNANDEZ.  Post procedure echo showed improved LV function , moderate to severe MR.

## 2024-10-15 ENCOUNTER — NON-APPOINTMENT (OUTPATIENT)
Age: 64
End: 2024-10-15

## 2024-10-23 ENCOUNTER — APPOINTMENT (OUTPATIENT)
Dept: PAIN MANAGEMENT | Facility: CLINIC | Age: 64
End: 2024-10-23
Payer: COMMERCIAL

## 2024-10-23 DIAGNOSIS — M54.16 RADICULOPATHY, LUMBAR REGION: ICD-10-CM

## 2024-10-23 PROCEDURE — 99212 OFFICE O/P EST SF 10 MIN: CPT

## 2024-10-23 NOTE — REASON FOR VISIT
[Home] : at home, [unfilled] , at the time of the visit. [Medical Office: ([facility])___] : at the medical office located in  [Patient] : the patient [Self] : self [Follow-Up Visit] : a follow-up pain management visit

## 2024-10-23 NOTE — DISCUSSION/SUMMARY
[Medication Risks Reviewed] : Medication risks reviewed [de-identified] : Prescriptions renewed. [place] and [place]. SOAPP-R completed on chart. The patient's medications are documented to the best of their ability. Quality of life and functional ability improved on medications. The patient is showing no aberrant behavior or evidence of diversion. The patient was advised not to use narcotic medication while operating an automobile or heavy machinery due to potential sedation or dizziness. The patient was educated to the risks associated with potential opioid dependence and addiction. Urine toxicology screens as per office protocol. Use of multimodal analgesia used prn. Follow up one month.

## 2024-10-23 NOTE — HISTORY OF PRESENT ILLNESS
[Left Leg] : left leg [Gradual] : gradual [8] : 8 [Burning] : burning [Localized] : localized [Sharp] : sharp [Stabbing] : stabbing [Constant] : constant [Household chores] : household chores [Work] : work [Sleep] : sleep [Rest] : rest [Meds] : meds [Walking] : walking [Retired] : Work status: retired [] : Post Surgical Visit: no [FreeTextEntry1] : LEFT FOOT  [FreeTextEntry6] : NUMBNESS IN LEFT FOOT  [FreeTextEntry7] : LT LEG GREATER RT SIDE  [de-identified] : 2021 [de-identified] : NO HOME EXERCISES/STRETCHES

## 2024-10-25 DIAGNOSIS — I34.0 NONRHEUMATIC MITRAL (VALVE) INSUFFICIENCY: ICD-10-CM

## 2024-10-26 LAB
ANION GAP SERPL CALC-SCNC: 13 MMOL/L
BUN SERPL-MCNC: 13 MG/DL
CALCIUM SERPL-MCNC: 9.7 MG/DL
CHLORIDE SERPL-SCNC: 98 MMOL/L
CO2 SERPL-SCNC: 26 MMOL/L
CREAT SERPL-MCNC: 1.21 MG/DL
EGFR: 67 ML/MIN/1.73M2
GLUCOSE SERPL-MCNC: 96 MG/DL
NT-PROBNP SERPL-MCNC: 410 PG/ML
POTASSIUM SERPL-SCNC: 3.6 MMOL/L
SODIUM SERPL-SCNC: 137 MMOL/L

## 2024-10-31 RX ORDER — TORSEMIDE 10 MG/1
10 TABLET ORAL DAILY
Qty: 90 | Refills: 3 | Status: ACTIVE | COMMUNITY
Start: 2024-10-31 | End: 1900-01-01

## 2024-11-08 ENCOUNTER — RESULT REVIEW (OUTPATIENT)
Age: 64
End: 2024-11-08

## 2024-11-08 ENCOUNTER — OUTPATIENT (OUTPATIENT)
Dept: OUTPATIENT SERVICES | Facility: HOSPITAL | Age: 64
LOS: 1 days | End: 2024-11-08
Payer: COMMERCIAL

## 2024-11-08 DIAGNOSIS — Z96.60 PRESENCE OF UNSPECIFIED ORTHOPEDIC JOINT IMPLANT: Chronic | ICD-10-CM

## 2024-11-08 DIAGNOSIS — Z98.89 OTHER SPECIFIED POSTPROCEDURAL STATES: Chronic | ICD-10-CM

## 2024-11-08 DIAGNOSIS — I34.0 NONRHEUMATIC MITRAL (VALVE) INSUFFICIENCY: ICD-10-CM

## 2024-11-08 DIAGNOSIS — Z98.890 OTHER SPECIFIED POSTPROCEDURAL STATES: Chronic | ICD-10-CM

## 2024-11-08 DIAGNOSIS — Z90.89 ACQUIRED ABSENCE OF OTHER ORGANS: Chronic | ICD-10-CM

## 2024-11-08 DIAGNOSIS — Z90.49 ACQUIRED ABSENCE OF OTHER SPECIFIED PARTS OF DIGESTIVE TRACT: Chronic | ICD-10-CM

## 2024-11-08 DIAGNOSIS — Z98.61 CORONARY ANGIOPLASTY STATUS: Chronic | ICD-10-CM

## 2024-11-08 DIAGNOSIS — Z98.62 PERIPHERAL VASCULAR ANGIOPLASTY STATUS: Chronic | ICD-10-CM

## 2024-11-08 PROCEDURE — 93306 TTE W/DOPPLER COMPLETE: CPT | Mod: 26

## 2024-11-08 PROCEDURE — 93306 TTE W/DOPPLER COMPLETE: CPT

## 2024-11-09 DIAGNOSIS — I34.0 NONRHEUMATIC MITRAL (VALVE) INSUFFICIENCY: ICD-10-CM

## 2024-11-18 ENCOUNTER — APPOINTMENT (OUTPATIENT)
Dept: CARDIOLOGY | Facility: CLINIC | Age: 64
End: 2024-11-18
Payer: COMMERCIAL

## 2024-11-18 VITALS
SYSTOLIC BLOOD PRESSURE: 161 MMHG | WEIGHT: 204 LBS | HEIGHT: 75 IN | HEART RATE: 67 BPM | BODY MASS INDEX: 25.36 KG/M2 | DIASTOLIC BLOOD PRESSURE: 95 MMHG | OXYGEN SATURATION: 100 %

## 2024-11-18 DIAGNOSIS — I50.30 UNSPECIFIED DIASTOLIC (CONGESTIVE) HEART FAILURE: ICD-10-CM

## 2024-11-18 DIAGNOSIS — I50.31 ACUTE DIASTOLIC (CONGESTIVE) HEART FAILURE: ICD-10-CM

## 2024-11-18 DIAGNOSIS — I21.4 NON-ST ELEVATION (NSTEMI) MYOCARDIAL INFARCTION: ICD-10-CM

## 2024-11-18 DIAGNOSIS — I34.0 NONRHEUMATIC MITRAL (VALVE) INSUFFICIENCY: ICD-10-CM

## 2024-11-18 DIAGNOSIS — I25.10 ATHEROSCLEROTIC HEART DISEASE OF NATIVE CORONARY ARTERY W/OUT ANGINA PECTORIS: ICD-10-CM

## 2024-11-18 PROCEDURE — 93000 ELECTROCARDIOGRAM COMPLETE: CPT

## 2024-11-18 PROCEDURE — 99214 OFFICE O/P EST MOD 30 MIN: CPT

## 2024-11-18 PROCEDURE — G2211 COMPLEX E/M VISIT ADD ON: CPT | Mod: NC

## 2024-11-18 RX ORDER — BUPROPION HYDROCHLORIDE 100 MG/1
100 TABLET, FILM COATED ORAL TWICE DAILY
Refills: 0 | Status: ACTIVE | COMMUNITY

## 2024-11-18 NOTE — DISCUSSION/SUMMARY
[FreeTextEntry1] : No ischemic symptoms, s/p RCA stetn.  Heart failure symptoms have resolved.  Continue low dose diuretic.  Consider mitraClip if SOB or other heart failure symptoms recur. [EKG obtained to assist in diagnosis and management of assessed problem(s)] : EKG obtained to assist in diagnosis and management of assessed problem(s)

## 2024-11-18 NOTE — HISTORY OF PRESENT ILLNESS
[FreeTextEntry1] : 64 year old man with severe PAD.  Recent presentation with heart failure and NSTEMI with RCA stenosis that was stented.  His SOB has resolved.  .  No chest discomfort.  Echo shows moderate to severe MR.

## 2024-11-19 ENCOUNTER — APPOINTMENT (OUTPATIENT)
Dept: PAIN MANAGEMENT | Facility: CLINIC | Age: 64
End: 2024-11-19
Payer: COMMERCIAL

## 2024-11-19 VITALS — BODY MASS INDEX: 25.67 KG/M2 | WEIGHT: 200 LBS | HEIGHT: 74 IN

## 2024-11-19 DIAGNOSIS — M54.16 RADICULOPATHY, LUMBAR REGION: ICD-10-CM

## 2024-11-19 PROCEDURE — 99213 OFFICE O/P EST LOW 20 MIN: CPT

## 2024-11-19 NOTE — HISTORY OF PRESENT ILLNESS
[Left Leg] : left leg [Gradual] : gradual [8] : 8 [3] : 3 [Burning] : burning [Localized] : localized [Sharp] : sharp [Stabbing] : stabbing [Constant] : constant [Household chores] : household chores [Work] : work [Sleep] : sleep [Rest] : rest [Meds] : meds [Walking] : walking [Retired] : Work status: retired [] : Post Surgical Visit: no [FreeTextEntry1] : LEFT FOOT  [FreeTextEntry6] : NUMBNESS IN LEFT FOOT  [FreeTextEntry7] : LT LEG GREATER RT SIDE  [de-identified] : 2021 [de-identified] : NO HOME EXERCISES/STRETCHES

## 2024-11-19 NOTE — DISCUSSION/SUMMARY
[Medication Risks Reviewed] : Medication risks reviewed [de-identified] : Prescriptions renewed. [place] and [place]. SOAPP-R completed on chart. The patient's medications are documented to the best of their ability. Quality of life and functional ability improved on medications. The patient is showing no aberrant behavior or evidence of diversion. The patient was advised not to use narcotic medication while operating an automobile or heavy machinery due to potential sedation or dizziness. The patient was educated to the risks associated with potential opioid dependence and addiction. Urine toxicology screens as per office protocol. Use of multimodal analgesia used prn. Follow up one month.

## 2024-12-18 ENCOUNTER — APPOINTMENT (OUTPATIENT)
Dept: PAIN MANAGEMENT | Facility: CLINIC | Age: 64
End: 2024-12-18
Payer: COMMERCIAL

## 2024-12-18 DIAGNOSIS — M54.16 RADICULOPATHY, LUMBAR REGION: ICD-10-CM

## 2024-12-18 PROCEDURE — 99212 OFFICE O/P EST SF 10 MIN: CPT

## 2024-12-18 NOTE — REASON FOR VISIT
[Home] : at home, [unfilled] , at the time of the visit. [Medical Office: ([facility])___] : at the medical office located in  [Patient] : the patient [Self] : self [Follow-Up Visit] : a follow-up pain management visit

## 2024-12-18 NOTE — DISCUSSION/SUMMARY
[Medication Risks Reviewed] : Medication risks reviewed [de-identified] : Prescriptions renewed. [place] and [place]. SOAPP-R completed on chart. The patient's medications are documented to the best of their ability. Quality of life and functional ability improved on medications. The patient is showing no aberrant behavior or evidence of diversion. The patient was advised not to use narcotic medication while operating an automobile or heavy machinery due to potential sedation or dizziness. The patient was educated to the risks associated with potential opioid dependence and addiction. Urine toxicology screens as per office protocol. Use of multimodal analgesia used prn. Follow up one month.

## 2024-12-18 NOTE — HISTORY OF PRESENT ILLNESS
[Left Leg] : left leg [Gradual] : gradual [8] : 8 [Burning] : burning [Localized] : localized [Sharp] : sharp [Stabbing] : stabbing [Constant] : constant [Household chores] : household chores [Work] : work [Sleep] : sleep [Rest] : rest [Meds] : meds [Walking] : walking [Retired] : Work status: retired [] : Post Surgical Visit: no [FreeTextEntry1] : LEFT FOOT  [FreeTextEntry6] : NUMBNESS IN LEFT FOOT  [FreeTextEntry7] : LT LEG GREATER RT SIDE  [de-identified] : 2021 [de-identified] : NO HOME EXERCISES/STRETCHES

## 2025-01-17 ENCOUNTER — APPOINTMENT (OUTPATIENT)
Dept: PAIN MANAGEMENT | Facility: CLINIC | Age: 65
End: 2025-01-17
Payer: COMMERCIAL

## 2025-01-17 DIAGNOSIS — M54.16 RADICULOPATHY, LUMBAR REGION: ICD-10-CM

## 2025-01-17 PROCEDURE — 99212 OFFICE O/P EST SF 10 MIN: CPT | Mod: 93

## 2025-01-17 NOTE — DISCUSSION/SUMMARY
[Medication Risks Reviewed] : Medication risks reviewed [de-identified] : Prescriptions renewed. [place] and [place]. SOAPP-R completed on chart. The patient's medications are documented to the best of their ability. Quality of life and functional ability improved on medications. The patient is showing no aberrant behavior or evidence of diversion. The patient was advised not to use narcotic medication while operating an automobile or heavy machinery due to potential sedation or dizziness. The patient was educated to the risks associated with potential opioid dependence and addiction. Urine toxicology screens as per office protocol. Use of multimodal analgesia used prn. Follow up one month.

## 2025-01-17 NOTE — REASON FOR VISIT
[Home] : at home, [unfilled] , at the time of the visit. [Medical Office: ([facility])___] : at the medical office located in  [Patient] : the patient [Self] : self [Follow-Up Visit] : a follow-up pain management visit

## 2025-01-17 NOTE — HISTORY OF PRESENT ILLNESS
[Left Leg] : left leg [Gradual] : gradual [8] : 8 [5] : 5 [Burning] : burning [Localized] : localized [Sharp] : sharp [Stabbing] : stabbing [Constant] : constant [Household chores] : household chores [Work] : work [Sleep] : sleep [Rest] : rest [Meds] : meds [Walking] : walking [Retired] : Work status: retired [] : Post Surgical Visit: no [FreeTextEntry1] : LEFT FOOT  [FreeTextEntry6] : NUMBNESS IN LEFT FOOT  [FreeTextEntry7] : LT LEG GREATER RT SIDE  [de-identified] : 2021 [de-identified] : NO HOME EXERCISES/STRETCHES

## 2025-01-31 ENCOUNTER — NON-APPOINTMENT (OUTPATIENT)
Age: 65
End: 2025-01-31

## 2025-02-26 ENCOUNTER — APPOINTMENT (OUTPATIENT)
Dept: PAIN MANAGEMENT | Facility: CLINIC | Age: 65
End: 2025-02-26
Payer: COMMERCIAL

## 2025-02-26 ENCOUNTER — APPOINTMENT (OUTPATIENT)
Dept: PAIN MANAGEMENT | Facility: CLINIC | Age: 65
End: 2025-02-26

## 2025-02-26 VITALS — BODY MASS INDEX: 24.87 KG/M2 | WEIGHT: 200 LBS | HEIGHT: 75 IN

## 2025-02-26 DIAGNOSIS — M54.16 RADICULOPATHY, LUMBAR REGION: ICD-10-CM

## 2025-02-26 PROCEDURE — 99213 OFFICE O/P EST LOW 20 MIN: CPT

## 2025-02-26 RX ORDER — NALOXONE HYDROCHLORIDE NASAL 4 MG/.1ML
4 SPRAY NASAL
Qty: 1 | Refills: 0 | Status: ACTIVE | COMMUNITY
Start: 2025-02-26 | End: 1900-01-01

## 2025-02-26 NOTE — HISTORY OF PRESENT ILLNESS
[Left Leg] : left leg [Gradual] : gradual [Burning] : burning [Localized] : localized [Sharp] : sharp [Stabbing] : stabbing [Constant] : constant [Household chores] : household chores [Work] : work [Sleep] : sleep [Rest] : rest [Meds] : meds [Walking] : walking [Retired] : Work status: retired [] : Post Surgical Visit: no [FreeTextEntry1] : LEFT FOOT  [FreeTextEntry6] : NUMBNESS IN LEFT FOOT  [FreeTextEntry7] : LT LEG GREATER RT SIDE  [de-identified] : 2021 [de-identified] : NO HOME EXERCISES/STRETCHES

## 2025-03-13 ENCOUNTER — APPOINTMENT (OUTPATIENT)
Age: 65
End: 2025-03-13
Payer: COMMERCIAL

## 2025-03-13 VITALS
HEART RATE: 66 BPM | SYSTOLIC BLOOD PRESSURE: 167 MMHG | BODY MASS INDEX: 24.87 KG/M2 | WEIGHT: 200 LBS | DIASTOLIC BLOOD PRESSURE: 87 MMHG | HEIGHT: 75 IN

## 2025-03-13 DIAGNOSIS — I21.9 ACUTE MYOCARDIAL INFARCTION, UNSPECIFIED: ICD-10-CM

## 2025-03-13 DIAGNOSIS — Z87.891 PERSONAL HISTORY OF NICOTINE DEPENDENCE: ICD-10-CM

## 2025-03-13 DIAGNOSIS — J44.9 CHRONIC OBSTRUCTIVE PULMONARY DISEASE, UNSPECIFIED: ICD-10-CM

## 2025-03-13 DIAGNOSIS — I25.10 ATHEROSCLEROTIC HEART DISEASE OF NATIVE CORONARY ARTERY W/OUT ANGINA PECTORIS: ICD-10-CM

## 2025-03-13 DIAGNOSIS — I65.23 OCCLUSION AND STENOSIS OF BILATERAL CAROTID ARTERIES: ICD-10-CM

## 2025-03-13 DIAGNOSIS — I70.212 ATHEROSCLEROSIS OF NATIVE ARTERIES OF EXTREMITIES WITH INTERMITTENT CLAUDICATION, LEFT LEG: ICD-10-CM

## 2025-03-13 DIAGNOSIS — I70.234 ATHEROSCLEROSIS OF NATIVE ARTERIES OF RIGHT LEG WITH ULCERATION OF HEEL AND MIDFOOT: ICD-10-CM

## 2025-03-13 PROCEDURE — 93926 LOWER EXTREMITY STUDY: CPT

## 2025-03-13 PROCEDURE — 99205 OFFICE O/P NEW HI 60 MIN: CPT

## 2025-03-13 PROCEDURE — 93923 UPR/LXTR ART STDY 3+ LVLS: CPT

## 2025-03-13 RX ORDER — ATORVASTATIN CALCIUM 80 MG/1
80 TABLET, FILM COATED ORAL
Refills: 0 | Status: ACTIVE | COMMUNITY

## 2025-03-13 RX ORDER — TORSEMIDE 10 MG/1
10 TABLET ORAL
Refills: 0 | Status: ACTIVE | COMMUNITY

## 2025-03-16 NOTE — PHYSICAL EXAM
[Normal Thyroid] : the thyroid was normal [Normal Breath Sounds] : Normal breath sounds [Normal Heart Sounds] : normal heart sounds [Normal Rate and Rhythm] : normal rate and rhythm [Left Carotid Bruit] : left carotid bruit heard [2+] : right 2+ [0] : left 0 [Skin Ulcer] : ulcer [Alert] : alert [Oriented to Person] : oriented to person [Oriented to Place] : oriented to place [Oriented to Time] : oriented to time [Calm] : calm [Carotid Bruits] : carotid bruit  [1+] : right 1+ [JVD] : no jugular venous distention  [Right Carotid Bruit] : no bruit heard over the right carotid [Ankle Swelling (On Exam)] : not present [] : not present [Varicose Veins Of Lower Extremities] : not present [Abdomen Masses] : No abdominal masses [Abdomen Tenderness] : ~T ~M No abdominal tenderness [de-identified] : He was in no distress [de-identified] : There was a 2 x 3 cm wound in the right posterior heel.  It had a dry eschar. There was also a dry eschar on the  postero-medial aspect of the right first metatarsal head.  It did not have any drainage and there was also no erythema.

## 2025-03-16 NOTE — PHYSICAL EXAM
[Normal Thyroid] : the thyroid was normal [Normal Breath Sounds] : Normal breath sounds [Normal Heart Sounds] : normal heart sounds [Normal Rate and Rhythm] : normal rate and rhythm [Left Carotid Bruit] : left carotid bruit heard [2+] : right 2+ [0] : left 0 [Skin Ulcer] : ulcer [Alert] : alert [Oriented to Person] : oriented to person [Oriented to Place] : oriented to place [Oriented to Time] : oriented to time [Calm] : calm [Carotid Bruits] : carotid bruit  [1+] : right 1+ [JVD] : no jugular venous distention  [Right Carotid Bruit] : no bruit heard over the right carotid [Ankle Swelling (On Exam)] : not present [] : not present [Varicose Veins Of Lower Extremities] : not present [Abdomen Masses] : No abdominal masses [Abdomen Tenderness] : ~T ~M No abdominal tenderness [de-identified] : He was in no distress [de-identified] : There was a 2 x 3 cm wound in the right posterior heel.  It had a dry eschar. There was also a dry eschar on the  postero-medial aspect of the right first metatarsal head.  It did not have any drainage and there was also no erythema.

## 2025-03-16 NOTE — ASSESSMENT
[FreeTextEntry1] : Mr. Soliman has severe PAD in both legs for over 20 years.  He initially had severe issues with his left leg.  He had a femorofemoral bypass in 2004.  In 2020 a left superficial femoral artery stent was inserted.  It occluded and apparently caused the bypass to also occlude.  He was treated with another femoro-femoral bypass, an iliac stent and a left femoral to popliteal artery bypass.  He has a long history of smoking and was still smoking 1/2 PPD of cigarettes today.  He now has wounds in his right heel and right first metatarsal head.  He has rest pain in the right foot where he has the wounds.  He has claudication in both legs when he walks 1 block.  He had decreased femoral pulses and no popliteal or pedal pulses in either leg.  He also had a left carotid bruit.  A PVR study severe right leg PAD.  The MAYCO was only 0.24.  On the left side it was 0.47.  The PVR waveforms in both feet were essentially flat.  A duplex scan noted a severe stenosis at the origin of the right superficial femoral artery.  The rest of the SFA and the proximal popliteal artery were occluded.  The distal popliteal and posterior tibial arteries were patent.  The peroneal artery and anterior tibial arteries had very severe disease.  On the left side his femoral to popliteal artery bypass was occluded.  There was also stenosis in the left popliteal artery.  He had coronary stents inserted last year and is still on DAPT.  He will get a CT angiogram of the aorta and bilateral lower extremity arteries. He will return next week to discuss the results.   At that time, we will also do a carotid duplex scan.  He had bilateral carotid endarterectomies, but the left side occluded 1 week after the surgery.  He had no recent imaging of the right carotid arteries.  He we will likely need a right leg bypass.  That could potentially be done on DAPT.  An endovascular treatment (angioplasty/stent) will be done if at all possible.  The extent of disease seen on the current studies I feel makes that less likely. If any surgery is felt to be necessary, he will obviously see his cardiologist preoperatively. Finally, he was strongly advised to quit smoking.  It is likely that with continued nicotine abuse he will eventually require a major amputation and further procedures for his CAD.  He said he will quit smoking today.  70 minutes were spent on this entire part saltation.  This included complex history, complex exam assessment of the imaging images and reports.  He was also counseled on quitting smoking.

## 2025-03-16 NOTE — HISTORY OF PRESENT ILLNESS
[FreeTextEntry1] : He has severe PAD for over 20 years.  He had a left to right femoro-femoral bypass in 2004.  In 2020 he had a left superficial femoral artery stent inserted.  That stent occluded and ultimately caused the femoral-femoral bypass to fail.  He had another femoro-femoral, a right iliac stent insertion and a left femoral to popliteal artery bypass.   He has a wound in his right leg for approximately 2 months.  The wound is on the posteromedial aspect of the right first metatarsal head.  He also has a wound in the right heel.  He has claudication in both legs. It occurs in his bilateral calves after he walks less than 1 block.  He has difficulty sleeping at night due to pain in his right foot and toes (rest pain).  He is currently smoking a half PPD of cigarettes every day.  For most of his life he smoked he smoked 1 PPD.  He has smoked for 43 years.  He also had bilateral carotid endarterectomies.  The left side was done in 2019 on the right in 2020. The left the right side occluded 1 week after the surgery. He had a minor stroke before the carotid surgery and a TIA when the artery occluded.  He also previously had an MI.  He has 2 coronary stents but never had a CABG.

## 2025-03-16 NOTE — REVIEW OF SYSTEMS
[Leg Claudication] : intermittent leg claudication [Limb Pain] : limb pain [Skin Wound] : skin wound [Negative] : Heme/Lymph [Chest Pain] : no chest pain

## 2025-03-24 ENCOUNTER — APPOINTMENT (OUTPATIENT)
Dept: CARDIOLOGY | Facility: CLINIC | Age: 65
End: 2025-03-24
Payer: COMMERCIAL

## 2025-03-24 ENCOUNTER — NON-APPOINTMENT (OUTPATIENT)
Age: 65
End: 2025-03-24

## 2025-03-24 ENCOUNTER — APPOINTMENT (OUTPATIENT)
Dept: CARDIOLOGY | Facility: CLINIC | Age: 65
End: 2025-03-24

## 2025-03-24 VITALS
WEIGHT: 200 LBS | HEART RATE: 94 BPM | DIASTOLIC BLOOD PRESSURE: 87 MMHG | BODY MASS INDEX: 24.87 KG/M2 | SYSTOLIC BLOOD PRESSURE: 128 MMHG | HEIGHT: 75 IN | OXYGEN SATURATION: 100 %

## 2025-03-24 DIAGNOSIS — I34.0 NONRHEUMATIC MITRAL (VALVE) INSUFFICIENCY: ICD-10-CM

## 2025-03-24 DIAGNOSIS — I25.10 ATHEROSCLEROTIC HEART DISEASE OF NATIVE CORONARY ARTERY W/OUT ANGINA PECTORIS: ICD-10-CM

## 2025-03-24 PROCEDURE — G2211 COMPLEX E/M VISIT ADD ON: CPT | Mod: NC

## 2025-03-24 PROCEDURE — 93000 ELECTROCARDIOGRAM COMPLETE: CPT

## 2025-03-24 PROCEDURE — 99214 OFFICE O/P EST MOD 30 MIN: CPT

## 2025-03-24 NOTE — DISCUSSION/SUMMARY
[FreeTextEntry1] : Moderate to severe MR with increasing SOB.  Repeat echo ordered.  CAD, no ischemic symptoms.  PAD Recent consult with vascular surgery. [EKG obtained to assist in diagnosis and management of assessed problem(s)] : EKG obtained to assist in diagnosis and management of assessed problem(s)

## 2025-03-24 NOTE — REASON FOR VISIT
[Structural Heart and Valve Disease] : structural heart and valve disease [Coronary Artery Disease] : coronary artery disease [Carotid, Aortic and Peripheral Vascular Disease] : carotid, aortic and peripheral vascular disease

## 2025-03-24 NOTE — HISTORY OF PRESENT ILLNESS
[FreeTextEntry1] : Patient with severe PAD, multiple sites.  CAD, presenting with CHF and NSTEMI 10/2023.  RCA stent, moderate to severe MR.  He has had increasing  PAD issues with severe claudication and nonhealing ulcer.  At the same time, he has had more SOB, requiring more diuretic use.

## 2025-03-26 ENCOUNTER — APPOINTMENT (OUTPATIENT)
Dept: PAIN MANAGEMENT | Facility: CLINIC | Age: 65
End: 2025-03-26
Payer: COMMERCIAL

## 2025-03-26 DIAGNOSIS — M16.11 UNILATERAL PRIMARY OSTEOARTHRITIS, RIGHT HIP: ICD-10-CM

## 2025-03-26 DIAGNOSIS — M54.16 RADICULOPATHY, LUMBAR REGION: ICD-10-CM

## 2025-03-26 DIAGNOSIS — G89.4 CHRONIC PAIN SYNDROME: ICD-10-CM

## 2025-03-26 LAB — NT-PROBNP SERPL-MCNC: 215 PG/ML

## 2025-03-26 PROCEDURE — 99212 OFFICE O/P EST SF 10 MIN: CPT | Mod: 95

## 2025-03-26 NOTE — REASON FOR VISIT
[Home] : at home, [unfilled] , at the time of the visit. [Medical Office: ([facility])___] : at the medical office located in  [Telehealth (audio & video)] : This visit was provided via telehealth using real-time 2-way audio visual technology. [Follow-Up Visit] : a follow-up pain management visit

## 2025-03-26 NOTE — DISCUSSION/SUMMARY
[Medication Risks Reviewed] : Medication risks reviewed [de-identified] : Prescriptions renewed. [place] and [place]. SOAPP-R completed on chart. The patient's medications are documented to the best of their ability. Quality of life and functional ability improved on medications. The patient is showing no aberrant behavior or evidence of diversion. The patient was advised not to use narcotic medication while operating an automobile or heavy machinery due to potential sedation or dizziness. The patient was educated to the risks associated with potential opioid dependence and addiction. Urine toxicology screens as per office protocol. Use of multimodal analgesia used prn. Follow up one month.

## 2025-04-03 ENCOUNTER — RESULT REVIEW (OUTPATIENT)
Age: 65
End: 2025-04-03

## 2025-04-03 ENCOUNTER — OUTPATIENT (OUTPATIENT)
Dept: OUTPATIENT SERVICES | Facility: HOSPITAL | Age: 65
LOS: 1 days | End: 2025-04-03
Payer: COMMERCIAL

## 2025-04-03 DIAGNOSIS — I34.0 NONRHEUMATIC MITRAL (VALVE) INSUFFICIENCY: ICD-10-CM

## 2025-04-03 DIAGNOSIS — Z98.89 OTHER SPECIFIED POSTPROCEDURAL STATES: Chronic | ICD-10-CM

## 2025-04-03 DIAGNOSIS — Z98.890 OTHER SPECIFIED POSTPROCEDURAL STATES: Chronic | ICD-10-CM

## 2025-04-03 DIAGNOSIS — Z96.60 PRESENCE OF UNSPECIFIED ORTHOPEDIC JOINT IMPLANT: Chronic | ICD-10-CM

## 2025-04-03 DIAGNOSIS — Z90.49 ACQUIRED ABSENCE OF OTHER SPECIFIED PARTS OF DIGESTIVE TRACT: Chronic | ICD-10-CM

## 2025-04-03 DIAGNOSIS — Z98.61 CORONARY ANGIOPLASTY STATUS: Chronic | ICD-10-CM

## 2025-04-03 DIAGNOSIS — Z90.89 ACQUIRED ABSENCE OF OTHER ORGANS: Chronic | ICD-10-CM

## 2025-04-03 DIAGNOSIS — Z98.62 PERIPHERAL VASCULAR ANGIOPLASTY STATUS: Chronic | ICD-10-CM

## 2025-04-03 PROCEDURE — 93306 TTE W/DOPPLER COMPLETE: CPT | Mod: 26

## 2025-04-03 PROCEDURE — 93306 TTE W/DOPPLER COMPLETE: CPT

## 2025-04-03 PROCEDURE — 93356 MYOCRD STRAIN IMG SPCKL TRCK: CPT

## 2025-04-03 PROCEDURE — 76376 3D RENDER W/INTRP POSTPROCES: CPT

## 2025-04-16 ENCOUNTER — NON-APPOINTMENT (OUTPATIENT)
Age: 65
End: 2025-04-16

## 2025-04-25 ENCOUNTER — APPOINTMENT (OUTPATIENT)
Dept: PAIN MANAGEMENT | Facility: CLINIC | Age: 65
End: 2025-04-25

## 2025-04-28 ENCOUNTER — APPOINTMENT (OUTPATIENT)
Dept: PAIN MANAGEMENT | Facility: CLINIC | Age: 65
End: 2025-04-28
Payer: COMMERCIAL

## 2025-04-28 DIAGNOSIS — M54.16 RADICULOPATHY, LUMBAR REGION: ICD-10-CM

## 2025-04-28 PROCEDURE — 99212 OFFICE O/P EST SF 10 MIN: CPT | Mod: 95

## 2025-04-28 NOTE — REASON FOR VISIT
[Home] : at home, [unfilled] , at the time of the visit. [Medical Office: ([facility])___] : at the medical office located in  [Telehealth (audio & video)] : This visit was provided via telehealth using real-time 2-way audio visual technology. [Follow-Up Visit] : a follow-up pain management visit

## 2025-04-28 NOTE — HISTORY OF PRESENT ILLNESS
[Left Leg] : left leg [Gradual] : gradual [Burning] : burning [Localized] : localized [Sharp] : sharp [Stabbing] : stabbing [Constant] : constant [Household chores] : household chores [Work] : work [Sleep] : sleep [Rest] : rest [Meds] : meds [Walking] : walking [Retired] : Work status: retired [] : Post Surgical Visit: no [FreeTextEntry1] : LEFT FOOT  [FreeTextEntry6] : NUMBNESS IN LEFT FOOT  [FreeTextEntry7] : LT LEG GREATER RT SIDE  [de-identified] : 2021 [de-identified] : NO HOME EXERCISES/STRETCHES

## 2025-04-28 NOTE — DISCUSSION/SUMMARY
[Medication Risks Reviewed] : Medication risks reviewed [de-identified] : Prescriptions renewed. [place] and [place]. SOAPP-R completed on chart. The patient's medications are documented to the best of their ability. Quality of life and functional ability improved on medications. The patient is showing no aberrant behavior or evidence of diversion. The patient was advised not to use narcotic medication while operating an automobile or heavy machinery due to potential sedation or dizziness. The patient was educated to the risks associated with potential opioid dependence and addiction. Urine toxicology screens as per office protocol. Use of multimodal analgesia used prn. Follow up one month.

## 2025-05-16 NOTE — ED ADULT NURSE NOTE - NURSING ED SKIN COLOR
I have personally verified, reviewed, and approved these actions.    The patient should go to the ED should they develop fever, chills, nausea, vomiting, significant gross hematuria, chest pain, SOB, calf pain, feelings of incomplete emptying, or should they otherwise feel they need evaluated.     normal for race

## 2025-05-23 ENCOUNTER — APPOINTMENT (OUTPATIENT)
Dept: PAIN MANAGEMENT | Facility: CLINIC | Age: 65
End: 2025-05-23
Payer: COMMERCIAL

## 2025-05-23 DIAGNOSIS — M16.11 UNILATERAL PRIMARY OSTEOARTHRITIS, RIGHT HIP: ICD-10-CM

## 2025-05-23 PROCEDURE — 99213 OFFICE O/P EST LOW 20 MIN: CPT | Mod: 95

## 2025-05-23 NOTE — HISTORY OF PRESENT ILLNESS
[Left Leg] : left leg [Gradual] : gradual [Burning] : burning [Localized] : localized [Sharp] : sharp [Stabbing] : stabbing [Constant] : constant [Household chores] : household chores [Work] : work [Sleep] : sleep [Rest] : rest [Meds] : meds [Walking] : walking [Retired] : Work status: retired [Home] : at home, [unfilled] , at the time of the visit. [Medical Office: ([facility])___] : at the medical office located in  [Telehealth (audio & video)] : This visit was provided via telehealth using real-time 2-way audio visual technology. [Verbal consent obtained from patient] : the patient, [unfilled] [] : Post Surgical Visit: no [FreeTextEntry1] : LEFT FOOT  [FreeTextEntry6] : NUMBNESS IN LEFT FOOT  [FreeTextEntry7] : LT LEG GREATER RT SIDE  [de-identified] : 2021 [de-identified] : NO HOME EXERCISES/STRETCHES

## 2025-05-29 NOTE — HISTORY OF PRESENT ILLNESS
[FreeTextEntry1] : 64 year old man with CAD s/p 2 LAD stents, most recent in 2013.  Severe PAD s/p multiple revascularizations.  he presents with acute dyspnea.  At [facility] ED, there was mild heart failure, new anterior and apical hypokinesis and deep precordial T wave inversion.  Troponin was 162.  Cardiac cath was advised, but pt left hospital.  Dyspnea has resolved Fall risk

## 2025-06-24 ENCOUNTER — APPOINTMENT (OUTPATIENT)
Dept: PAIN MANAGEMENT | Facility: CLINIC | Age: 65
End: 2025-06-24
Payer: COMMERCIAL

## 2025-06-24 VITALS — WEIGHT: 210 LBS | BODY MASS INDEX: 26.95 KG/M2 | HEIGHT: 74 IN

## 2025-06-24 PROCEDURE — 99212 OFFICE O/P EST SF 10 MIN: CPT

## 2025-06-24 NOTE — HISTORY OF PRESENT ILLNESS
[Left Leg] : left leg [Gradual] : gradual [9] : 9 [3] : 3 [Burning] : burning [Localized] : localized [Sharp] : sharp [Stabbing] : stabbing [Constant] : constant [Household chores] : household chores [Work] : work [Sleep] : sleep [Rest] : rest [Meds] : meds [Walking] : walking [Retired] : Work status: retired [] : Post Surgical Visit: no [FreeTextEntry1] : LEFT FOOT  [FreeTextEntry6] : NUMBNESS IN LEFT FOOT  [FreeTextEntry7] : LT LEG GREATER RT SIDE  [de-identified] : 2021 [de-identified] : NO HOME EXERCISES/STRETCHES

## 2025-06-24 NOTE — HISTORY OF PRESENT ILLNESS
[Left Leg] : left leg [Gradual] : gradual [9] : 9 [3] : 3 [Burning] : burning [Localized] : localized [Sharp] : sharp [Stabbing] : stabbing [Constant] : constant [Household chores] : household chores [Work] : work [Sleep] : sleep [Rest] : rest [Meds] : meds [Walking] : walking [Retired] : Work status: retired [] : Post Surgical Visit: no [FreeTextEntry1] : LEFT FOOT  [FreeTextEntry6] : NUMBNESS IN LEFT FOOT  [FreeTextEntry7] : LT LEG GREATER RT SIDE  [de-identified] : 2021 [de-identified] : NO HOME EXERCISES/STRETCHES

## 2025-06-24 NOTE — DISCUSSION/SUMMARY
[Medication Risks Reviewed] : Medication risks reviewed [de-identified] : Prescriptions renewed. [place] and [place]. SOAPP-R completed on chart. The patient's medications are documented to the best of their ability. Quality of life and functional ability improved on medications. The patient is showing no aberrant behavior or evidence of diversion. The patient was advised not to use narcotic medication while operating an automobile or heavy machinery due to potential sedation or dizziness. The patient was educated to the risks associated with potential opioid dependence and addiction. Urine toxicology screens as per office protocol. Use of multimodal analgesia used prn. Follow up one month.

## 2025-07-23 ENCOUNTER — APPOINTMENT (OUTPATIENT)
Dept: PAIN MANAGEMENT | Facility: CLINIC | Age: 65
End: 2025-07-23
Payer: COMMERCIAL

## 2025-07-23 DIAGNOSIS — M54.16 RADICULOPATHY, LUMBAR REGION: ICD-10-CM

## 2025-07-23 PROCEDURE — 99212 OFFICE O/P EST SF 10 MIN: CPT | Mod: 93

## 2025-07-23 NOTE — DISCUSSION/SUMMARY
[Medication Risks Reviewed] : Medication risks reviewed [de-identified] : Prescriptions renewed. Opioid agreement/obtained on chart [place]  reviewed and appropriate. SOAPP-R completed on chart. The patient's medications are documented to the best of their ability. Quality of life and functional ability improved on medications. The patient is showing no aberrant behavior or evidence of diversion. The patient was advised not to use narcotic medication while operating an automobile or heavy machinery due to potential sedation or dizziness. The patient was educated to the risks associated with potential opioid dependence and addiction. Urine toxicology screens as per office protocol. Use of multimodal analgesia used prn. Follow up one month.  This clinical note is not designed to be interpreted by the patient and we do not recommend reading it unless you have medical training. These notes may contain candid and (unintentionally) offensive descriptions, which are sometimes required for accurate documentation. In addition, patients cannot request to have the documentation modified (unless there is an error of fact). If you would like more information about your healthcare, please obtain it directly from myself or my staff/ colleagues- never solely from the medical notes. Thank you for your understanding and cooperation.

## 2025-07-23 NOTE — HISTORY OF PRESENT ILLNESS
[Left Leg] : left leg [Gradual] : gradual [9] : 9 [3] : 3 [Burning] : burning [Localized] : localized [Sharp] : sharp [Stabbing] : stabbing [Constant] : constant [Household chores] : household chores [Work] : work [Sleep] : sleep [Rest] : rest [Meds] : meds [Walking] : walking [Retired] : Work status: retired [] : Post Surgical Visit: no [FreeTextEntry1] : LEFT FOOT  [FreeTextEntry6] : NUMBNESS IN LEFT FOOT  [FreeTextEntry7] : LT LEG GREATER RT SIDE  [de-identified] : 2021 [de-identified] : NO HOME EXERCISES/STRETCHES

## 2025-08-25 ENCOUNTER — APPOINTMENT (OUTPATIENT)
Dept: PAIN MANAGEMENT | Facility: CLINIC | Age: 65
End: 2025-08-25
Payer: COMMERCIAL

## 2025-08-25 DIAGNOSIS — M54.16 RADICULOPATHY, LUMBAR REGION: ICD-10-CM

## 2025-08-25 PROCEDURE — 99212 OFFICE O/P EST SF 10 MIN: CPT | Mod: 93
